# Patient Record
Sex: FEMALE | Race: ASIAN | NOT HISPANIC OR LATINO | URBAN - METROPOLITAN AREA
[De-identification: names, ages, dates, MRNs, and addresses within clinical notes are randomized per-mention and may not be internally consistent; named-entity substitution may affect disease eponyms.]

---

## 2019-10-29 ENCOUNTER — EMERGENCY (EMERGENCY)
Facility: HOSPITAL | Age: 79
LOS: 1 days | Discharge: HOME | End: 2019-10-29
Attending: EMERGENCY MEDICINE | Admitting: EMERGENCY MEDICINE
Payer: MEDICARE

## 2019-10-29 VITALS
DIASTOLIC BLOOD PRESSURE: 55 MMHG | SYSTOLIC BLOOD PRESSURE: 139 MMHG | HEART RATE: 68 BPM | RESPIRATION RATE: 16 BRPM | TEMPERATURE: 99 F

## 2019-10-29 LAB
APPEARANCE UR: CLEAR — SIGNIFICANT CHANGE UP
BACTERIA # UR AUTO: NEGATIVE — SIGNIFICANT CHANGE UP
BILIRUB UR-MCNC: NEGATIVE — SIGNIFICANT CHANGE UP
BLOOD UR QL VISUAL: SIGNIFICANT CHANGE UP
COLOR SPEC: SIGNIFICANT CHANGE UP
GLUCOSE UR-MCNC: 70 — SIGNIFICANT CHANGE UP
HYALINE CASTS # UR AUTO: NEGATIVE — SIGNIFICANT CHANGE UP
KETONES UR-MCNC: NEGATIVE — SIGNIFICANT CHANGE UP
LEUKOCYTE ESTERASE UR-ACNC: SIGNIFICANT CHANGE UP
NITRITE UR-MCNC: NEGATIVE — SIGNIFICANT CHANGE UP
PH UR: 7.5 — SIGNIFICANT CHANGE UP (ref 5–8)
PROT UR-MCNC: 200 — HIGH
RBC CASTS # UR COMP ASSIST: HIGH (ref 0–?)
SP GR SPEC: 1.01 — SIGNIFICANT CHANGE UP (ref 1–1.04)
SQUAMOUS # UR AUTO: SIGNIFICANT CHANGE UP
TSH SERPL-MCNC: 0.99 UIU/ML — SIGNIFICANT CHANGE UP (ref 0.27–4.2)
UROBILINOGEN FLD QL: NORMAL — SIGNIFICANT CHANGE UP
WBC UR QL: SIGNIFICANT CHANGE UP (ref 0–?)

## 2019-10-29 PROCEDURE — 93970 EXTREMITY STUDY: CPT | Mod: 26

## 2019-10-29 PROCEDURE — 99283 EMERGENCY DEPT VISIT LOW MDM: CPT | Mod: GC

## 2019-10-29 NOTE — ED PROVIDER NOTE - PHYSICAL EXAMINATION
Well appearing, well nourished, awake, alert, oriented to person, place, time/situation and in no apparent distress.    Airway patent    Eyes without scleral injection. No jaundice.    Strong pulse. 3/6 DANNIE heart murmur best heard at LUSB.    Respirations unlabored. Lungs clear.     Abdomen soft, non-tender, no guarding.    Spine appears normal, range of motion is not limited, no muscle or joint tenderness. (B) non-pitting LE edema.    Alert and oriented, no gross motor or sensory deficits.    Skin normal color for race, warm, dry and intact. No evidence of rash.    No SI/HI.

## 2019-10-29 NOTE — ED ADULT TRIAGE NOTE - CHIEF COMPLAINT QUOTE
sent in by nephrologist for b/l leg swelling x a few days. denies any pain. denies chest pain, sob. hx esrd on dialysis MWF. recently moved here and was in a car for 8 hours.

## 2019-10-29 NOTE — ED PROVIDER NOTE - OBJECTIVE STATEMENT
Poonam: 79 F, MWF dialysis, sent by Nephrologist (Isela Canales) for DVT to eval for leg swelling. Pt. had recent 8-hour car ride from [place]. No CP. Has OLMOS (long-standing).

## 2019-10-29 NOTE — ED PROVIDER NOTE - PATIENT PORTAL LINK FT
You can access the FollowMyHealth Patient Portal offered by [facility] by registering at the following website: [email or web address]. By joining [facility]’s FollowMyHealth portal, you will also be able to view your health information using other applications (apps) compatible with our system.

## 2019-10-29 NOTE — ED PROVIDER NOTE - ATTENDING CONTRIBUTION TO CARE
I performed a face-to-face evaluation of the patient and performed a history and physical examination. I agree with the history and physical examination.    Poonam: DARIUSx of leg swelling in this dialysis pt.: 1) volume overload (not likely, as gets routine dialysis, no SOB, non-pitting edema), 2) nephrotic syndrome (check UA), 3) hypothyroidism (check TSH), 4) DVT (possible (b/c long car ride, but not red/hot/tender) - check US, 5) Amoldipine side effect (has been on 5 yrs).

## 2019-10-29 NOTE — ED PROVIDER NOTE - CARE PLAN
Principal Discharge DX:	Leg swelling  Secondary Diagnosis:	HTN (hypertension)  Secondary Diagnosis:	Dialysis patient

## 2019-10-29 NOTE — ED PROVIDER NOTE - NSFOLLOWUPINSTRUCTIONS_ED_ALL_ED_FT
Follow-up with your Nephrologist for dialysis as scheduled. Discuss adding an ACE-Inhibitor or ARB medication for blood pressure and to decreased protein loss in your urine with your Nephrologist.     Return to the ED for any worsening leg swelling, chest pain, shortness of breath, fevers or chills, or any new concerning symptoms.

## 2019-10-29 NOTE — ED ADULT TRIAGE NOTE - RELATIONSHIP TO PATIENT
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 Abdomen closed with Aphthera, plan re explore on saterday   Surgeon: Darren Rod MD   Assistants(s): Dr. Derik Arreguin and Dr. Umu ATKINSON   Estimated blood loss: 3000 C    Specimens: Explant liver   Findings: Please see dictated notes      daughter

## 2019-10-29 NOTE — ED ADULT NURSE NOTE - OBJECTIVE STATEMENT
c/o right lower leg swelling, pt sent in by MD for r/o dvt, pt c/o b/l lower extremity edema, b/l legs normal in temperature and color, b/l pedal pulses felt, pt denies cp sob fever chills, fistula to left arm + bruit thrill felt, dialysis m,w,f, last session 10/28

## 2019-10-29 NOTE — ED PROVIDER NOTE - CLINICAL SUMMARY MEDICAL DECISION MAKING FREE TEXT BOX
Poonam: DDx of leg swelling in this dialysis pt.: 1) volume overload (not likely, as gets routine dialysis, no SOB, non-pitting edema), 2) nephrotic syndrome (check UA), 3) hypothyroidism (check TSH), 4) DVT (possible (b/c long car ride, but not red/hot/tender) - check US, 5) Amoldipine side effect (has been on 5 yrs).

## 2020-10-10 ENCOUNTER — INPATIENT (INPATIENT)
Facility: HOSPITAL | Age: 80
LOS: 2 days | Discharge: HOME | End: 2020-10-13
Attending: INTERNAL MEDICINE | Admitting: INTERNAL MEDICINE
Payer: MEDICARE

## 2020-10-10 VITALS
DIASTOLIC BLOOD PRESSURE: 46 MMHG | TEMPERATURE: 97 F | HEART RATE: 72 BPM | OXYGEN SATURATION: 100 % | SYSTOLIC BLOOD PRESSURE: 177 MMHG | RESPIRATION RATE: 19 BRPM

## 2020-10-10 DIAGNOSIS — R06.00 DYSPNEA, UNSPECIFIED: ICD-10-CM

## 2020-10-10 DIAGNOSIS — N18.6 END STAGE RENAL DISEASE: ICD-10-CM

## 2020-10-10 DIAGNOSIS — Z29.9 ENCOUNTER FOR PROPHYLACTIC MEASURES, UNSPECIFIED: ICD-10-CM

## 2020-10-10 DIAGNOSIS — D64.9 ANEMIA, UNSPECIFIED: ICD-10-CM

## 2020-10-10 DIAGNOSIS — I10 ESSENTIAL (PRIMARY) HYPERTENSION: ICD-10-CM

## 2020-10-10 DIAGNOSIS — D61.818 OTHER PANCYTOPENIA: ICD-10-CM

## 2020-10-10 PROBLEM — Z99.2 DEPENDENCE ON RENAL DIALYSIS: Chronic | Status: ACTIVE | Noted: 2019-10-29

## 2020-10-10 LAB
ALBUMIN SERPL ELPH-MCNC: 4.3 G/DL — SIGNIFICANT CHANGE UP (ref 3.3–5)
ALP SERPL-CCNC: 134 U/L — HIGH (ref 40–120)
ALT FLD-CCNC: 23 U/L — SIGNIFICANT CHANGE UP (ref 4–33)
ANION GAP SERPL CALC-SCNC: 12 MMO/L — SIGNIFICANT CHANGE UP (ref 7–14)
ANISOCYTOSIS BLD QL: SLIGHT — SIGNIFICANT CHANGE UP
AST SERPL-CCNC: 33 U/L — HIGH (ref 4–32)
BASOPHILS # BLD AUTO: 0.03 K/UL — SIGNIFICANT CHANGE UP (ref 0–0.2)
BASOPHILS NFR BLD AUTO: 0.9 % — SIGNIFICANT CHANGE UP (ref 0–2)
BASOPHILS NFR SPEC: 0.9 % — SIGNIFICANT CHANGE UP (ref 0–2)
BILIRUB SERPL-MCNC: 0.5 MG/DL — SIGNIFICANT CHANGE UP (ref 0.2–1.2)
BLASTS # FLD: 0 % — SIGNIFICANT CHANGE UP (ref 0–0)
BLD GP AB SCN SERPL QL: NEGATIVE — SIGNIFICANT CHANGE UP
BUN SERPL-MCNC: 31 MG/DL — HIGH (ref 7–23)
CALCIUM SERPL-MCNC: 10 MG/DL — SIGNIFICANT CHANGE UP (ref 8.4–10.5)
CHLORIDE SERPL-SCNC: 94 MMOL/L — LOW (ref 98–107)
CO2 SERPL-SCNC: 30 MMOL/L — SIGNIFICANT CHANGE UP (ref 22–31)
CREAT SERPL-MCNC: 4.37 MG/DL — HIGH (ref 0.5–1.3)
EOSINOPHIL # BLD AUTO: 0.05 K/UL — SIGNIFICANT CHANGE UP (ref 0–0.5)
EOSINOPHIL NFR BLD AUTO: 1.5 % — SIGNIFICANT CHANGE UP (ref 0–6)
EOSINOPHIL NFR FLD: 0.9 % — SIGNIFICANT CHANGE UP (ref 0–6)
GIANT PLATELETS BLD QL SMEAR: PRESENT — SIGNIFICANT CHANGE UP
GLUCOSE SERPL-MCNC: 120 MG/DL — HIGH (ref 70–99)
HBA1C BLD-MCNC: 5.6 % — SIGNIFICANT CHANGE UP (ref 4–5.6)
HBV SURFACE AG SER-ACNC: NEGATIVE — SIGNIFICANT CHANGE UP
HCT VFR BLD CALC: 22.6 % — LOW (ref 34.5–45)
HCT VFR BLD CALC: 30.6 % — LOW (ref 34.5–45)
HGB BLD-MCNC: 7.4 G/DL — LOW (ref 11.5–15.5)
HGB BLD-MCNC: 9.9 G/DL — LOW (ref 11.5–15.5)
HYPOCHROMIA BLD QL: SLIGHT — SIGNIFICANT CHANGE UP
IMM GRANULOCYTES NFR BLD AUTO: 0.3 % — SIGNIFICANT CHANGE UP (ref 0–1.5)
IRON SATN MFR SERPL: 152 UG/DL — SIGNIFICANT CHANGE UP (ref 30–160)
IRON SATN MFR SERPL: 211 UG/DL — SIGNIFICANT CHANGE UP (ref 140–530)
LYMPHOCYTES # BLD AUTO: 0.46 K/UL — LOW (ref 1–3.3)
LYMPHOCYTES # BLD AUTO: 14.2 % — SIGNIFICANT CHANGE UP (ref 13–44)
LYMPHOCYTES NFR SPEC AUTO: 8.8 % — LOW (ref 13–44)
MACROCYTES BLD QL: SLIGHT — SIGNIFICANT CHANGE UP
MCHC RBC-ENTMCNC: 32.1 PG — SIGNIFICANT CHANGE UP (ref 27–34)
MCHC RBC-ENTMCNC: 32.4 % — SIGNIFICANT CHANGE UP (ref 32–36)
MCHC RBC-ENTMCNC: 32.7 % — SIGNIFICANT CHANGE UP (ref 32–36)
MCHC RBC-ENTMCNC: 32.7 PG — SIGNIFICANT CHANGE UP (ref 27–34)
MCV RBC AUTO: 100 FL — SIGNIFICANT CHANGE UP (ref 80–100)
MCV RBC AUTO: 99.4 FL — SIGNIFICANT CHANGE UP (ref 80–100)
METAMYELOCYTES # FLD: 0 % — SIGNIFICANT CHANGE UP (ref 0–1)
MONOCYTES # BLD AUTO: 0.29 K/UL — SIGNIFICANT CHANGE UP (ref 0–0.9)
MONOCYTES NFR BLD AUTO: 8.9 % — SIGNIFICANT CHANGE UP (ref 2–14)
MONOCYTES NFR BLD: 5.3 % — SIGNIFICANT CHANGE UP (ref 2–9)
MYELOCYTES NFR BLD: 0 % — SIGNIFICANT CHANGE UP (ref 0–0)
NEUTROPHIL AB SER-ACNC: 83.2 % — HIGH (ref 43–77)
NEUTROPHILS # BLD AUTO: 2.41 K/UL — SIGNIFICANT CHANGE UP (ref 1.8–7.4)
NEUTROPHILS NFR BLD AUTO: 74.2 % — SIGNIFICANT CHANGE UP (ref 43–77)
NEUTS BAND # BLD: 0 % — SIGNIFICANT CHANGE UP (ref 0–6)
NRBC # BLD: 1 /100WBC — SIGNIFICANT CHANGE UP
NRBC # FLD: 0 K/UL — SIGNIFICANT CHANGE UP (ref 0–0)
NRBC # FLD: 0 K/UL — SIGNIFICANT CHANGE UP (ref 0–0)
NT-PROBNP SERPL-SCNC: SIGNIFICANT CHANGE UP PG/ML
OB PNL STL: POSITIVE — SIGNIFICANT CHANGE UP
OTHER - HEMATOLOGY %: 0 — SIGNIFICANT CHANGE UP
PLATELET # BLD AUTO: 86 K/UL — LOW (ref 150–400)
PLATELET # BLD AUTO: 92 K/UL — LOW (ref 150–400)
PLATELET COUNT - ESTIMATE: SIGNIFICANT CHANGE UP
PMV BLD: 10.4 FL — SIGNIFICANT CHANGE UP (ref 7–13)
PMV BLD: 11.1 FL — SIGNIFICANT CHANGE UP (ref 7–13)
POTASSIUM SERPL-MCNC: 3.7 MMOL/L — SIGNIFICANT CHANGE UP (ref 3.5–5.3)
POTASSIUM SERPL-SCNC: 3.7 MMOL/L — SIGNIFICANT CHANGE UP (ref 3.5–5.3)
PROMYELOCYTES # FLD: 0 % — SIGNIFICANT CHANGE UP (ref 0–0)
PROT SERPL-MCNC: 6.4 G/DL — SIGNIFICANT CHANGE UP (ref 6–8.3)
RBC # BLD: 2.26 M/UL — LOW (ref 3.8–5.2)
RBC # BLD: 3.08 M/UL — LOW (ref 3.8–5.2)
RBC # FLD: 13.2 % — SIGNIFICANT CHANGE UP (ref 10.3–14.5)
RBC # FLD: 15.6 % — HIGH (ref 10.3–14.5)
RH IG SCN BLD-IMP: POSITIVE — SIGNIFICANT CHANGE UP
SARS-COV-2 RNA SPEC QL NAA+PROBE: SIGNIFICANT CHANGE UP
SCHISTOCYTES BLD QL AUTO: SLIGHT — SIGNIFICANT CHANGE UP
SODIUM SERPL-SCNC: 136 MMOL/L — SIGNIFICANT CHANGE UP (ref 135–145)
TROPONIN T, HIGH SENSITIVITY: 120 NG/L — CRITICAL HIGH (ref ?–14)
TROPONIN T, HIGH SENSITIVITY: 136 NG/L — CRITICAL HIGH (ref ?–14)
TSH SERPL-MCNC: 2.22 UIU/ML — SIGNIFICANT CHANGE UP (ref 0.27–4.2)
UIBC SERPL-MCNC: 59 UG/DL — LOW (ref 110–370)
VARIANT LYMPHS # BLD: 0.9 % — SIGNIFICANT CHANGE UP
WBC # BLD: 3.25 K/UL — LOW (ref 3.8–10.5)
WBC # BLD: 4.46 K/UL — SIGNIFICANT CHANGE UP (ref 3.8–10.5)
WBC # FLD AUTO: 3.25 K/UL — LOW (ref 3.8–10.5)
WBC # FLD AUTO: 4.46 K/UL — SIGNIFICANT CHANGE UP (ref 3.8–10.5)

## 2020-10-10 PROCEDURE — 99285 EMERGENCY DEPT VISIT HI MDM: CPT

## 2020-10-10 PROCEDURE — 71046 X-RAY EXAM CHEST 2 VIEWS: CPT | Mod: 26

## 2020-10-10 PROCEDURE — 99223 1ST HOSP IP/OBS HIGH 75: CPT

## 2020-10-10 RX ORDER — AMLODIPINE BESYLATE 2.5 MG/1
10 TABLET ORAL DAILY
Refills: 0 | Status: DISCONTINUED | OUTPATIENT
Start: 2020-10-10 | End: 2020-10-13

## 2020-10-10 RX ORDER — LABETALOL HCL 100 MG
200 TABLET ORAL THREE TIMES A DAY
Refills: 0 | Status: DISCONTINUED | OUTPATIENT
Start: 2020-10-10 | End: 2020-10-13

## 2020-10-10 RX ORDER — LOSARTAN POTASSIUM 100 MG/1
100 TABLET, FILM COATED ORAL DAILY
Refills: 0 | Status: DISCONTINUED | OUTPATIENT
Start: 2020-10-10 | End: 2020-10-13

## 2020-10-10 RX ORDER — LABETALOL HCL 100 MG
200 TABLET ORAL ONCE
Refills: 0 | Status: COMPLETED | OUTPATIENT
Start: 2020-10-10 | End: 2020-10-10

## 2020-10-10 RX ADMIN — Medication 200 MILLIGRAM(S): at 22:00

## 2020-10-10 RX ADMIN — Medication 1 TABLET(S): at 16:32

## 2020-10-10 RX ADMIN — LOSARTAN POTASSIUM 100 MILLIGRAM(S): 100 TABLET, FILM COATED ORAL at 16:32

## 2020-10-10 RX ADMIN — Medication 200 MILLIGRAM(S): at 15:48

## 2020-10-10 RX ADMIN — AMLODIPINE BESYLATE 10 MILLIGRAM(S): 2.5 TABLET ORAL at 16:32

## 2020-10-10 NOTE — H&P ADULT - PROBLEM SELECTOR PLAN 3
-Uncontrolled as outpt. Elevated here as well  -C/w norvasc 10, olmasartan 40 and labetalol 200TID  -Can uptitrate labetalol if BP remains elevated

## 2020-10-10 NOTE — H&P ADULT - PROBLEM SELECTOR PLAN 1
-patient reports OLMOS and PND for last 3-4 weeks  -Differential include symptomatic anemia vs valvular abnormality causing CHF  -per daughter baseline hgb 10 about 2 weeks ago. Hgb downtrending since discontinuation of epogen as output.   -s/p 1 unit PRBC in ED. Anticipate post transfuse hgb to be 8.5 or greater. Hold off further transfusion at this time.   -TTE to evaluate EF and valvular pathology. Per daughter worsening "leaky valve" as outpt. Reach out to cardiologist Dr. Rodriguez on Monday for collateral.   -Appears euvolemic currently. Lungs clear. POCUS without blines. CXR unremarkable as well.   -Trops stable; elevated likely 2/2 ESRD. No prior EKG for comparison.

## 2020-10-10 NOTE — ED SUB INTERN NOTE - OBJECTIVE STATEMENT FT
79 y/o Slovak speaking female with pmhx of HTN, moderate to severe leaky valve, L AV fistula on dialysis (MWF - last dialysis yesterday) presents to the ED with worsening SOB and fatigue for several days. She also reports associated chest tightness and palpitations - stating she can hear her heart beat in her ears. At baseline, pt is able to go up the stairs but her SOB has made it increasingly more difficult. The SOB is worse at night and with lying down. She experiences mild relief with sitting up. Pt denies hx of recent blood transfusions (last transfusion during childbirth) or GI bleed. No other medical complaints at this time.   Pt refused a , she asked for her daughter to serve as .

## 2020-10-10 NOTE — H&P ADULT - HISTORY OF PRESENT ILLNESS
79 y/o F with PMH of HTN, "leaky valve" and ESRD on HD who presents to ED for transfusion given recent progressively worsening SOB. Patient is English speaking and history obtained from daughter. Per daughter patient's baseline hgb is 9-10. About 2 weeks ago epogen was stopped by nephrologist given uncontrolled BP. Since epogen discontinuation patient's hgb has been dropping. Last week it was 9.1 and 3 days ago it was found to be 7.5 and outpt providers recommended admission for transfusion. Patient also has noted difficulty with breathing when laying flat and with ambulation for last 3-4 weeks. Per daughter patient had mild leaky valve in May however it became moderate to severe in september. Her valve is being monitored by her cardiologist Dr. Rodriguez. Daughter does not know which valve is leaky. Patient denies any melena or BRBPR. Reports her stools are purple due to medication called Aurixa. Patient denies cough, fever, headache.     Vital Signs Last 24 Hrs  T(C): 36.9 (10 Oct 2020 14:57), Max: 36.9 (10 Oct 2020 14:57)  T(F): 98.4 (10 Oct 2020 14:57), Max: 98.4 (10 Oct 2020 14:57)  HR: 77 (10 Oct 2020 14:57) (72 - 78)  BP: 189/69 (10 Oct 2020 14:57) (173/68 - 197/60)  BP(mean): --  RR: 18 (10 Oct 2020 14:57) (17 - 19)  SpO2: 99% (10 Oct 2020 14:57) (97% - 100%)

## 2020-10-10 NOTE — ED ADULT NURSE NOTE - CHIEF COMPLAINT QUOTE
Pt primarily Maltese speaking, declines  services daughter to translate. Pt c/o sob, weakness. Reports hbg 7.5 in dialysis blood work last Wednesday. Lft AV fistula noted. PMHX Dialysis MWF, HTN, blood transfusions.  Daughter Garrett Turner

## 2020-10-10 NOTE — H&P ADULT - ASSESSMENT
79 y/o F with PMH of HTN, "leaky valve" and ESRD on HD who presents to ED for transfusion given recent progressively worsening SOB found to be anemic

## 2020-10-10 NOTE — H&P ADULT - PROBLEM SELECTOR PLAN 2
-Suspect anemia of chronic disease 2/2 ESRD  -Add on ferretin, iron and TIBC ( not accurate if checked post transfusion)  -Epo stopped few weeks ago due to uncontrolled HTN. Was given Micera as outpt recently.   -Occult positive however no reported melena or BRBPR. Last EGD/colon in 2019 was largely unremarkable per daughter. At this time no overt signs of GIB  -Monitor hgb.   -Can consider GI consult if signs of GIB

## 2020-10-10 NOTE — H&P ADULT - PROBLEM SELECTOR PLAN 4
-HD per renal  -epo when ok with renal   -needs control of BP first  -Micera given recently as outpt -Unknown baseline; attempt to get baseline labs from PCP on Monday  -Possible MDS? May need close outpt f/u and possible heme evaluation   -check b12, folate

## 2020-10-10 NOTE — ED PROVIDER NOTE - OBJECTIVE STATEMENT
81 yo female c pmhx ESRD on dialysis (MWF), HTN, Romanian speaking (decline translation services and preferred her daughter  Garrett Turner) presents to ED c/o SOB, palpitations, fatigue x few days.  SOB worse at night when laying down and on exertion.  Per daughter, last week hemoglobin was 7.5.  Last blood transfusion decades ago after childbirth. Pt's last dialysis yesterday.  Pt denies any abd pain, dark stools, dizziness, fever, headache, worsening leg swelling.

## 2020-10-10 NOTE — H&P ADULT - NSHPLABSRESULTS_GEN_ALL_CORE
7.4    3.25  )-----------( 86       ( 10 Oct 2020 08:40 )             22.6   10-10    136  |  94<L>  |  31<H>  ----------------------------<  120<H>  3.7   |  30  |  4.37<H>    Ca    10.0      10 Oct 2020 08:40    TPro  6.4  /  Alb  4.3  /  TBili  0.5  /  DBili  x   /  AST  33<H>  /  ALT  23  /  AlkPhos  134<H>  10-10      < from: Xray Chest 2 Views PA/Lat (10.10.20 @ 09:37) >    FINDINGS:    Vascular graft in the region of the left subclavian.  Lungs are free of focal consolidations.  Bilateral basilar atelectasis. No pleural effusion or pneumothorax.  Heart size is enlarged.  No acute osseous abnormalities.      IMPRESSION: No focal consolidations.    < end of copied text >

## 2020-10-10 NOTE — ED ADULT TRIAGE NOTE - ISOLATION TYPE:
72F with PMH of HTN, HLD, MI (1995), CHF (unknown EF), ICD with explant and replacement in abdominal compartment (patient unsure why),A-fib on xaralto p/w acute chest pain. Patient saw her cardiologist on Monday for worsening lower extremity edema. He started on diuretics and set patient up for a TTE/stress test in March.    patient c/o cp in ER now resolved she can sleep flat    As per hospitalist pt is clear for discharge 72F with PMH of HTN, HLD, MI (1995), CHF (unknown EF), ICD with explant and replacement in abdominal compartment (patient unsure why),A-fib on xaralto p/w acute chest pain. Patient saw her cardiologist on Monday for worsening lower extremity edema. He started on diuretics and set patient up for a TTE/stress test in March.    patient c/o cp in ER now resolved she can ambulate without SOB to bathroom and lie flat. Arrangements were made with patient's private cardiologist to go for stress test within 24 hours of discharge. Given asymptomatic, cleared by her cardiologist and stress test made within 24 hours, patient is stable for discharge None

## 2020-10-10 NOTE — ED ADULT NURSE NOTE - NSIMPLEMENTINTERV_GEN_ALL_ED
Implemented All Fall with Harm Risk Interventions:  [place] to call system. Call bell, personal items and telephone within reach. Instruct patient to call for assistance. Room bathroom lighting operational. Non-slip footwear when patient is off stretcher. Physically safe environment: no spills, clutter or unnecessary equipment. Stretcher in lowest position, wheels locked, appropriate side rails in place. Provide visual cue, wrist band, yellow gown, etc. Monitor gait and stability. Monitor for mental status changes and reorient to person, place, and time. Review medications for side effects contributing to fall risk. Reinforce activity limits and safety measures with patient and family. Provide visual clues: red socks.

## 2020-10-10 NOTE — ED PROVIDER NOTE - CLINICAL SUMMARY MEDICAL DECISION MAKING FREE TEXT BOX
81 yo female c pmhx ESRD on dialysis (MWF), HTN, Portuguese speaking (decline translation services and preferred her daughter  Garrett Turner) presents to ED c/o SOB, palpitations, fatigue x few days.  Reported hemoglobin 7.5 from last week at dialysis.  r/o symptomatic anemia vs ACS- check labs, ekg, cxr

## 2020-10-10 NOTE — ED ADULT NURSE NOTE - OBJECTIVE STATEMENT
Pt sent to ER due to low hgb 7.5. Had diaysis on Friday, goes MWF, + left arm fistula. As per Daughter Heesun " She c/o feeling weak, short of breath especially at night and walking, also c/o palpitations and Chest tightness and some dizziness. "  Hx of Transfusions..Presently denies any chest pain. As per daughter no falls but noticed she seems weaker and needs more asst at home walks slower, has lived alone but recently daughter is staying with her. Pt instructed via translation  to use call bell, verbalizing understanding, Placed on cm, labs to follow. Handoff to oncoming RN Mu to follow.

## 2020-10-10 NOTE — H&P ADULT - PROBLEM SELECTOR PLAN 5
SCDs for now -HD per renal  -epo when ok with renal   -needs control of BP first  -Micera given recently as outpt

## 2020-10-10 NOTE — ED PROVIDER NOTE - ATTENDING CONTRIBUTION TO CARE
DR. MORRIS, ATTENDING MD-  I performed a face to face bedside interview with the patient regarding history of present illness, review of symptoms and past medical history. I completed an independent physical exam.  I have discussed the patient's plan of care with the PA.   Documentation as above in the note.    81 y/o female h/o esrd on hd mwf, htn c/o exertional dyspnea, orthopnea, fatigue x few days.  Hgb last week was 7.5.  Baseline per daughter is 10.  Denies black/bloody stool.  Per daughter, epogen was making her mother's bp elevated so it was held for two weeks and subsequently her hgb downtrended and her nephrologist recommended ED visit for transfusion.  Denies f/c, ha, neck stiffness, cp, cough, abd pain, n/v/d, rash.  Afebrile, pale, fatigued, ctabil, s1s2 rrr no m/r/g, abd soft non ttp no r/g, no cva tenderness b/l, leg swelling b/l, no rash.  Likely sympt anemia, consider acs, new chf, pna, lyte abn.  Obtain cbc cmp trop bnp t&s cxr ekg covid swab, admit for xfusion.

## 2020-10-10 NOTE — ED SUB INTERN NOTE - GENERIC SYMPTOMS NEGATIVE FT
No f/c, cough, sore throat, n/v, abd pain, abd distention, worsening edema, recent illness, sick contacts, recent travel

## 2020-10-10 NOTE — ED PROVIDER NOTE - CARE PLAN
Principal Discharge DX:	Symptomatic anemia   Principal Discharge DX:	Symptomatic anemia  Secondary Diagnosis:	Shortness of breath

## 2020-10-10 NOTE — CONSULT NOTE ADULT - SUBJECTIVE AND OBJECTIVE BOX
[facility]   MD Kyler Cuevas MD, D.O.  TOÑA Membreno    From 7 AM - 5 PM:  OFFICE: [phone]  Dr. Adame cell: [phone]  Dr. Montgomery Cell: [phone]  Dr. Durant cell: [phone]  TOÑA Gonzalez cell: [phone]    From 5 PM - 7 AM: Answering Service: [phone]  Date of service 10-10-20 @ 16:12    -- INITIAL RENAL CONSULT NOTE  --------------------------------------------------------------------------------  HPI: 79 yo female w/ pmhx ESRD on dialysis (MWF) via AVF from [place] HD unit, HTN, Danish speaking (decline translation services and preferred her daughter  Garrett Turner) admitted with c/o SOB, palpitations, fatigue x few days from symptomatic anemia.  SOB worse at night when laying down and on exertion. Per daughter, last week hemoglobin was 7.5.  Last blood transfusion decades ago after childbirth. Pt's last dialysis Friday.  Pt denies any abd pain, dark stools, dizziness, fever, headache, worsening leg swelling.    PAST HISTORY  --------------------------------------------------------------------------------  PAST MEDICAL & SURGICAL HISTORY:  HTN (hypertension)    Dialysis patient      FAMILY HISTORY:    PAST SOCIAL HISTORY:    ALLERGIES & MEDICATIONS  --------------------------------------------------------------------------------  Allergies    alcohol swabs (Unknown)  IV contrast (Urticaria)  No Known Drug Allergies    Intolerances      Standing Inpatient Medications  amLODIPine   Tablet 10 milliGRAM(s) Oral daily  labetalol 200 milliGRAM(s) Oral three times a day  losartan 100 milliGRAM(s) Oral daily  Nephro-terri 1 Tablet(s) Oral daily    PRN Inpatient Medications      REVIEW OF SYSTEMS  --------------------------------------------------------------------------------  Gen: No fevers/chills  Skin: No rashes  Head/Eyes/Ears: Normal hearing,  Normal vision   Respiratory: No dyspnea, cough  CV: No chest pain  GI: No abdominal pain, diarrhea, constipation, nausea, vomiting  : No dysuria, hematuria  MSK: No  edema  Heme: No easy bruising or bleeding  Psych: No significant depression    All other systems were reviewed and are negative, except as noted.    VITALS/PHYSICAL EXAM  --------------------------------------------------------------------------------  T(C): 36.9 (10-10-20 @ 14:57), Max: 36.9 (10-10-20 @ 14:57)  HR: 77 (10-10-20 @ 14:57) (72 - 78)  BP: 189/69 (10-10-20 @ 14:57) (173/68 - 197/60)  RR: 18 (10-10-20 @ 14:57) (17 - 19)  SpO2: 99% (10-10-20 @ 14:57) (97% - 100%)  Wt(kg): --  Height (cm): 170.2 (10-10-20 @ 12:53)  Weight (kg): 53.7 (10-10-20 @ 12:53)  BMI (kg/m2): 18.5 (10-10-20 @ 12:53)  BSA (m2): 1.62 (10-10-20 @ 12:53)      Physical Exam:  	Gen: NAD  	HEENT: MMM  	Pulm: CTA B/L  	CV: S1S2  	Abd: Soft, +BS   	Ext: No LE edema B/L  	Neuro: Awake  	Skin: Warm and dry  	Vascular access: +AVF    LABS/STUDIES  --------------------------------------------------------------------------------              7.4    3.25  >-----------<  86       [10-10-20 @ 08:40]              22.6     136  |  94  |  31  ----------------------------<  120      [10-10-20 @ 08:40]  3.7   |  30  |  4.37        Ca     10.0     [10-10-20 @ 08:40]    TPro  6.4  /  Alb  4.3  /  TBili  0.5  /  DBili  x   /  AST  33  /  ALT  23  /  AlkPhos  134  [10-10-20 @ 08:40]          Creatinine Trend:  SCr 4.37 [10-10 @ 08:40]    Urinalysis - [10-29-19 @ 18:10]      Color LIGHT YELLOW / Appearance CLEAR / SG 1.011 / pH 7.5      Gluc 70 / Ketone NEGATIVE  / Bili NEGATIVE / Urobili NORMAL       Blood SMALL / Protein 200 / Leuk Est TRACE / Nitrite NEGATIVE      RBC 6-10 / WBC 3-5 / Hyaline NEGATIVE / Gran  / Sq Epi OCC / Non Sq Epi  / Bacteria NEGATIVE      Iron 152, TIBC 211, %sat --      [10-10-20 @ 10:35]  TSH 2.22      [10-10-20 @ 10:35]

## 2020-10-11 ENCOUNTER — TRANSCRIPTION ENCOUNTER (OUTPATIENT)
Age: 80
End: 2020-10-11

## 2020-10-11 LAB
ALBUMIN SERPL ELPH-MCNC: 4.2 G/DL — SIGNIFICANT CHANGE UP (ref 3.3–5)
ALP SERPL-CCNC: 131 U/L — HIGH (ref 40–120)
ALT FLD-CCNC: 20 U/L — SIGNIFICANT CHANGE UP (ref 4–33)
ANION GAP SERPL CALC-SCNC: 13 MMO/L — SIGNIFICANT CHANGE UP (ref 7–14)
APPEARANCE UR: CLEAR — SIGNIFICANT CHANGE UP
AST SERPL-CCNC: 25 U/L — SIGNIFICANT CHANGE UP (ref 4–32)
BACTERIA # UR AUTO: NEGATIVE — SIGNIFICANT CHANGE UP
BASOPHILS # BLD AUTO: 0.03 K/UL — SIGNIFICANT CHANGE UP (ref 0–0.2)
BASOPHILS NFR BLD AUTO: 0.9 % — SIGNIFICANT CHANGE UP (ref 0–2)
BILIRUB SERPL-MCNC: 1 MG/DL — SIGNIFICANT CHANGE UP (ref 0.2–1.2)
BILIRUB UR-MCNC: NEGATIVE — SIGNIFICANT CHANGE UP
BLOOD UR QL VISUAL: NEGATIVE — SIGNIFICANT CHANGE UP
BUN SERPL-MCNC: 46 MG/DL — HIGH (ref 7–23)
CALCIUM SERPL-MCNC: 10 MG/DL — SIGNIFICANT CHANGE UP (ref 8.4–10.5)
CHLORIDE SERPL-SCNC: 91 MMOL/L — LOW (ref 98–107)
CO2 SERPL-SCNC: 28 MMOL/L — SIGNIFICANT CHANGE UP (ref 22–31)
COLOR SPEC: SIGNIFICANT CHANGE UP
CREAT SERPL-MCNC: 5.84 MG/DL — HIGH (ref 0.5–1.3)
EOSINOPHIL # BLD AUTO: 0.06 K/UL — SIGNIFICANT CHANGE UP (ref 0–0.5)
EOSINOPHIL NFR BLD AUTO: 1.7 % — SIGNIFICANT CHANGE UP (ref 0–6)
FERRITIN SERPL-MCNC: 2109 NG/ML — HIGH (ref 15–150)
FOLATE SERPL-MCNC: > 20 NG/ML — HIGH (ref 4.7–20)
GLUCOSE SERPL-MCNC: 98 MG/DL — SIGNIFICANT CHANGE UP (ref 70–99)
GLUCOSE UR-MCNC: 50 — SIGNIFICANT CHANGE UP
HCT VFR BLD CALC: 26.5 % — LOW (ref 34.5–45)
HGB BLD-MCNC: 8.9 G/DL — LOW (ref 11.5–15.5)
HYALINE CASTS # UR AUTO: NEGATIVE — SIGNIFICANT CHANGE UP
IMM GRANULOCYTES NFR BLD AUTO: 0.3 % — SIGNIFICANT CHANGE UP (ref 0–1.5)
KETONES UR-MCNC: NEGATIVE — SIGNIFICANT CHANGE UP
LEUKOCYTE ESTERASE UR-ACNC: NEGATIVE — SIGNIFICANT CHANGE UP
LYMPHOCYTES # BLD AUTO: 0.66 K/UL — LOW (ref 1–3.3)
LYMPHOCYTES # BLD AUTO: 18.9 % — SIGNIFICANT CHANGE UP (ref 13–44)
MAGNESIUM SERPL-MCNC: 2.2 MG/DL — SIGNIFICANT CHANGE UP (ref 1.6–2.6)
MCHC RBC-ENTMCNC: 32.7 PG — SIGNIFICANT CHANGE UP (ref 27–34)
MCHC RBC-ENTMCNC: 33.6 % — SIGNIFICANT CHANGE UP (ref 32–36)
MCV RBC AUTO: 97.4 FL — SIGNIFICANT CHANGE UP (ref 80–100)
MONOCYTES # BLD AUTO: 0.32 K/UL — SIGNIFICANT CHANGE UP (ref 0–0.9)
MONOCYTES NFR BLD AUTO: 9.2 % — SIGNIFICANT CHANGE UP (ref 2–14)
NEUTROPHILS # BLD AUTO: 2.41 K/UL — SIGNIFICANT CHANGE UP (ref 1.8–7.4)
NEUTROPHILS NFR BLD AUTO: 69 % — SIGNIFICANT CHANGE UP (ref 43–77)
NITRITE UR-MCNC: NEGATIVE — SIGNIFICANT CHANGE UP
NRBC # FLD: 0 K/UL — SIGNIFICANT CHANGE UP (ref 0–0)
PH UR: 8 — SIGNIFICANT CHANGE UP (ref 5–8)
PHOSPHATE SERPL-MCNC: 3.8 MG/DL — SIGNIFICANT CHANGE UP (ref 2.5–4.5)
PLATELET # BLD AUTO: 85 K/UL — LOW (ref 150–400)
PMV BLD: 10.6 FL — SIGNIFICANT CHANGE UP (ref 7–13)
POTASSIUM SERPL-MCNC: 3.7 MMOL/L — SIGNIFICANT CHANGE UP (ref 3.5–5.3)
POTASSIUM SERPL-SCNC: 3.7 MMOL/L — SIGNIFICANT CHANGE UP (ref 3.5–5.3)
PROT SERPL-MCNC: 6.2 G/DL — SIGNIFICANT CHANGE UP (ref 6–8.3)
PROT UR-MCNC: 300 — HIGH
RBC # BLD: 2.72 M/UL — LOW (ref 3.8–5.2)
RBC # FLD: 15.9 % — HIGH (ref 10.3–14.5)
RBC CASTS # UR COMP ASSIST: SIGNIFICANT CHANGE UP (ref 0–?)
SARS-COV-2 IGG SERPL QL IA: NEGATIVE — SIGNIFICANT CHANGE UP
SARS-COV-2 IGM SERPL IA-ACNC: 0.09 INDEX — SIGNIFICANT CHANGE UP
SODIUM SERPL-SCNC: 132 MMOL/L — LOW (ref 135–145)
SP GR SPEC: 1.01 — SIGNIFICANT CHANGE UP (ref 1–1.04)
SQUAMOUS # UR AUTO: SIGNIFICANT CHANGE UP
UROBILINOGEN FLD QL: NORMAL — SIGNIFICANT CHANGE UP
VIT B12 SERPL-MCNC: 1512 PG/ML — HIGH (ref 200–900)
WBC # BLD: 3.49 K/UL — LOW (ref 3.8–10.5)
WBC # FLD AUTO: 3.49 K/UL — LOW (ref 3.8–10.5)
WBC UR QL: SIGNIFICANT CHANGE UP (ref 0–?)

## 2020-10-11 PROCEDURE — 93306 TTE W/DOPPLER COMPLETE: CPT | Mod: 26

## 2020-10-11 RX ORDER — LIDOCAINE AND PRILOCAINE CREAM 25; 25 MG/G; MG/G
1 CREAM TOPICAL
Refills: 0 | Status: DISCONTINUED | OUTPATIENT
Start: 2020-10-11 | End: 2020-10-13

## 2020-10-11 RX ORDER — ONDANSETRON 8 MG/1
4 TABLET, FILM COATED ORAL ONCE
Refills: 0 | Status: DISCONTINUED | OUTPATIENT
Start: 2020-10-11 | End: 2020-10-13

## 2020-10-11 RX ADMIN — AMLODIPINE BESYLATE 10 MILLIGRAM(S): 2.5 TABLET ORAL at 06:04

## 2020-10-11 RX ADMIN — Medication 200 MILLIGRAM(S): at 22:27

## 2020-10-11 RX ADMIN — LOSARTAN POTASSIUM 100 MILLIGRAM(S): 100 TABLET, FILM COATED ORAL at 06:04

## 2020-10-11 RX ADMIN — Medication 200 MILLIGRAM(S): at 13:38

## 2020-10-11 RX ADMIN — Medication 200 MILLIGRAM(S): at 06:04

## 2020-10-11 RX ADMIN — Medication 1 TABLET(S): at 13:37

## 2020-10-11 NOTE — DISCHARGE NOTE PROVIDER - NSDCFUADDAPPT_GEN_ALL_CORE_FT
Follow up with your primary care physician for further monitoring in 1-2 weeks. Please call to arrange appointment.  Follow up with nephrology within 1-2 weeks of discharge.  Follow up with cardiology within 1-2 weeks of discharge.

## 2020-10-11 NOTE — CONSULT NOTE ADULT - SUBJECTIVE AND OBJECTIVE BOX
Stevie Zamorano MD  Interventional Cardiology / Advance Heart Failure and Cardiac Transplant Specialist  [place] Office : [address] [place] [address] [place] [address]  Tel:   [place] Office : [address] [facility] [place] [address]  Tel: [phone]  Cell : [phone]    HISTORY OF PRESENTING ILLNESS:  79 y/o F with PMH of HTN, "leaky valve" and ESRD on HD who presents to ED for transfusion given recent progressively worsening SOB. Patient is Belarusian speaking and history obtained from daughter. Per daughter patient's baseline hgb is 9-10. About 2 weeks ago epogen was stopped by nephrologist given uncontrolled BP. Since epogen discontinuation patient's hgb has been dropping. Last week it was 9.1 and 3 days ago it was found to be 7.5 and outpt providers recommended admission for transfusion. Patient also has noted difficulty with breathing when laying flat and with ambulation for last 3-4 weeks. Per daughter patient had mild leaky valve in May however it became moderate to severe in september. Her valve is being monitored by her cardiologist Dr. Rodriguez. Daughter does not know which valve is leaky. Patient denies any melena or BRBPR. Reports her stools are purple due to medication called Aurixa. Patient denies cough, fever, headache.   	  MEDICATIONS:  amLODIPine   Tablet 10 milliGRAM(s) Oral daily  labetalol 200 milliGRAM(s) Oral three times a day  losartan 100 milliGRAM(s) Oral daily              Nephro-terri 1 Tablet(s) Oral daily      PAST MEDICAL/SURGICAL HISTORY  PAST MEDICAL & SURGICAL HISTORY:  HTN (hypertension)    Dialysis patient        SOCIAL HISTORY: Substance Use (street drugs): ( x ) never used  (  ) other:    FAMILY HISTORY:      REVIEW OF SYSTEMS:  CONSTITUTIONAL: No fever, weight loss, or fatigue  EYES: No eye pain, visual disturbances, or discharge  ENMT:  No difficulty hearing, tinnitus, vertigo; No sinus or throat pain  BREASTS: No pain, masses, or nipple discharge  GASTROINTESTINAL: No abdominal or epigastric pain. No nausea, vomiting, or hematemesis; No diarrhea or constipation. No melena or hematochezia.  GENITOURINARY: No dysuria, frequency, hematuria, or incontinence  NEUROLOGICAL: No headaches, memory loss, loss of strength, numbness, or tremors  ENDOCRINE: No heat or cold intolerance; No hair loss  MUSCULOSKELETAL: No joint pain or swelling; No muscle, back, or extremity pain  PSYCHIATRIC: No depression, anxiety, mood swings, or difficulty sleeping  HEME/LYMPH: No easy bruising, or bleeding gums  All others negative    PHYSICAL EXAM:  T(C): 37 (10-11-20 @ 12:47), Max: 37 (10-11-20 @ 12:47)  HR: 65 (10-11-20 @ 12:47) (65 - 77)  BP: 158/46 (10-11-20 @ 12:47) (144/52 - 189/69)  RR: 18 (10-11-20 @ 12:47) (18 - 18)  SpO2: 100% (10-11-20 @ 12:47) (97% - 100%)  Wt(kg): --  I&O's Summary    10 Oct 2020 07:01  -  11 Oct 2020 07:00  --------------------------------------------------------  IN: 600 mL / OUT: 0 mL / NET: 600 mL          GENERAL: NAD  EYES: EOMI, PERRLA, conjunctiva and sclera clear  ENMT: No tonsillar erythema, exudates, or enlargement; Moist mucous membranes, Good dentition, No lesions  Cardiovascular: Normal S1 S2, No JVD, No murmurs, No edema  Respiratory: Lungs clear to auscultation	  Gastrointestinal:  Soft, Non-tender, + BS	  Extremities: Normal range of motion, No clubbing, cyanosis or edema  LYMPH: No lymphadenopathy noted  NERVOUS SYSTEM:  Alert & Oriented X3, Good concentration; Motor Strength 5/5 B/L upper and lower extremities; DTRs 2+ intact and symmetric                                    8.9    3.49  )-----------( 85       ( 11 Oct 2020 06:34 )             26.5     10-11    132<L>  |  91<L>  |  46<H>  ----------------------------<  98  3.7   |  28  |  5.84<H>    Ca    10.0      11 Oct 2020 06:34  Phos  3.8     10-11  Mg     2.2     10-11    TPro  6.2  /  Alb  4.2  /  TBili  1.0  /  DBili  x   /  AST  25  /  ALT  20  /  AlkPhos  131<H>  10-11    proBNP:   Lipid Profile:   HgA1c:   TSH:     Consultant(s) Notes Reviewed:  [x ] YES  [ ] NO    Care Discussed with Consultants/Other Providers [ x] YES  [ ] NO    Imaging Personally Reviewed independently:  [x] YES  [ ] NO    All labs, radiologic studies, vitals, orders and medications list reviewed. Patient is seen and examined at bedside. Case discussed with medical team.         Stevie Zamorano MD  Interventional Cardiology / Advance Heart Failure and Cardiac Transplant Specialist  [place] Office : [address] [place] [address] [place] [address]  Tel:   [place] Office : [address] [facility] [place] [address]  Tel: [phone]  Cell : [phone]    HISTORY OF PRESENTING ILLNESS:  79 y/o F with PMH of HTN, "leaky valve" and ESRD on HD who presents to ED for transfusion given recent progressively worsening SOB. Patient is Mongolian speaking and history obtained from daughter. Per daughter patient's baseline hgb is 9-10. About 2 weeks ago epogen was stopped by nephrologist given uncontrolled BP. Since epogen discontinuation patient's hgb has been dropping. Last week it was 9.1 and 3 days ago it was found to be 7.5 and outpt providers recommended admission for transfusion. Patient also has noted difficulty with breathing when laying flat and with ambulation for last 1.5 months. Per daughter patient had mild leaky valve in May however it became moderate to severe in september. Her valve is being monitored by her cardiologist Dr. Rodriguez. Daughter does not know which valve is leaky. Patient denies any melena or BRBPR. Reports her stools are purple due to medication called Aurixa. Patient denies cough, fever, headache.   	  MEDICATIONS:  amLODIPine   Tablet 10 milliGRAM(s) Oral daily  labetalol 200 milliGRAM(s) Oral three times a day  losartan 100 milliGRAM(s) Oral daily              Nephro-terri 1 Tablet(s) Oral daily      PAST MEDICAL/SURGICAL HISTORY  PAST MEDICAL & SURGICAL HISTORY:  HTN (hypertension)    Dialysis patient        SOCIAL HISTORY: Substance Use (street drugs): ( x ) never used  (  ) other:    FAMILY HISTORY:      REVIEW OF SYSTEMS:  CONSTITUTIONAL: No fever, weight loss, or fatigue  EYES: No eye pain, visual disturbances, or discharge  ENMT:  No difficulty hearing, tinnitus, vertigo; No sinus or throat pain  BREASTS: No pain, masses, or nipple discharge  GASTROINTESTINAL: No abdominal or epigastric pain. No nausea, vomiting, or hematemesis; No diarrhea or constipation. No melena or hematochezia.  GENITOURINARY: No dysuria, frequency, hematuria, or incontinence  NEUROLOGICAL: No headaches, memory loss, loss of strength, numbness, or tremors  ENDOCRINE: No heat or cold intolerance; No hair loss  MUSCULOSKELETAL: No joint pain or swelling; No muscle, back, or extremity pain  PSYCHIATRIC: No depression, anxiety, mood swings, or difficulty sleeping  HEME/LYMPH: No easy bruising, or bleeding gums  All others negative    PHYSICAL EXAM:  T(C): 37 (10-11-20 @ 12:47), Max: 37 (10-11-20 @ 12:47)  HR: 65 (10-11-20 @ 12:47) (65 - 77)  BP: 158/46 (10-11-20 @ 12:47) (144/52 - 189/69)  RR: 18 (10-11-20 @ 12:47) (18 - 18)  SpO2: 100% (10-11-20 @ 12:47) (97% - 100%)  Wt(kg): --  I&O's Summary    10 Oct 2020 07:01  -  11 Oct 2020 07:00  --------------------------------------------------------  IN: 600 mL / OUT: 0 mL / NET: 600 mL          GENERAL: NAD  EYES: EOMI, PERRLA, conjunctiva and sclera clear  ENMT: No tonsillar erythema, exudates, or enlargement  Cardiovascular: Normal S1 S2, No JVD, Holosystolic murmur, No edema  Respiratory: crackles b/l  Gastrointestinal:  Soft, Non-tender, + BS	  Extremities: Normal range of motion, No clubbing, cyanosis or edema  NERVOUS SYSTEM:  Alert & Oriented X3                                    8.9    3.49  )-----------( 85       ( 11 Oct 2020 06:34 )             26.5     10-11    132<L>  |  91<L>  |  46<H>  ----------------------------<  98  3.7   |  28  |  5.84<H>    Ca    10.0      11 Oct 2020 06:34  Phos  3.8     10-11  Mg     2.2     10-11    TPro  6.2  /  Alb  4.2  /  TBili  1.0  /  DBili  x   /  AST  25  /  ALT  20  /  AlkPhos  131<H>  10-11    proBNP:   Lipid Profile:   HgA1c:   TSH:     Consultant(s) Notes Reviewed:  [x ] YES  [ ] NO    Care Discussed with Consultants/Other Providers [ x] YES  [ ] NO    Imaging Personally Reviewed independently:  [x] YES  [ ] NO    All labs, radiologic studies, vitals, orders and medications list reviewed. Patient is seen and examined at bedside. Case discussed with medical team.

## 2020-10-11 NOTE — PROGRESS NOTE ADULT - ASSESSMENT
81 yo female w/ pmhx ESRD on dialysis (MWF) via AVF from [facility], HTN, Romanian speaking (decline translation services and preferred her daughter  Garrett Turner) admitted with c/o SOB, palpitations, fatigue x few days from symptomatic anemia.  SOB worse at night when laying down and on exertion. Per daughter, last week hemoglobin was 7.5.  Last blood transfusion decades ago after childbirth. Pt's last dialysis Friday.  Pt denies any abd pain, dark stools, dizziness, fever, headache, worsening leg swelling.    ESRD on HD via AVF MWF  from [facility]  HD consent in the chart  No plan for HD today  Pt cleared from nephrology for d/c post blood transfusion once medically optimized  Plan for HD Monday if pt remains inpt     HTN  BP elevated   continue home BP medications--losartan, labetalol, amlodipine     Anemia  Hb low, pt symptomatic  s/p prbcs   Improved

## 2020-10-11 NOTE — PROGRESS NOTE ADULT - ASSESSMENT
Problem/Plan - 1:  ·  Problem: Dyspnea on exertion.  Plan: -patient reports OLMOS and PND for last 3-4 weeks  -Differential include symptomatic anemia vs valvular abnormality causing CHF  -per daughter baseline hgb 10 about 2 weeks ago. Hgb downtrending since discontinuation of epogen as output.   -s/p 1 unit PRBC in ED. Anticipate post transfuse hgb to be 8.5 or greater. Hold off further transfusion at this time.   -TTE to evaluate EF and valvular pathology. Per daughter worsening "leaky valve" as outpt. Reach out to cardiologist Dr. Rodriguez on Monday for collateral.   -Appears euvolemic currently. Lungs clear. POCUS without blines. CXR unremarkable as well.   -Trops stable; elevated likely 2/2 ESRD. No prior EKG for comparison.     Problem/Plan - 2:  ·  Problem: Anemia.  Plan: -Suspect anemia of chronic disease 2/2 ESRD  -Add on ferretin, iron and TIBC ( not accurate if checked post transfusion)  -Epo stopped few weeks ago due to uncontrolled HTN. Was given Micera as outpt recently.   -Occult positive however no reported melena or BRBPR. Last EGD/colon in 2019 was largely unremarkable per daughter. At this time no overt signs of GIB  -Monitor hgb.   -Can consider GI consult if signs of GIB.     Problem/Plan - 3:  ·  Problem: HTN (hypertension).  Plan: -Uncontrolled as outpt. Elevated here as well  -C/w norvasc 10, olmasartan 40 and labetalol 200TID  -Can uptitrate labetalol if BP remains elevated.     Problem/Plan - 4:  ·  Problem: Pancytopenia. Plan: -Unknown baseline; attempt to get baseline labs from PCP on Monday  -Possible MDS? May need close outpt f/u and possible heme evaluation   -check b12, folate.  Problem/Plan - 5:  ·  Problem: ESRD on dialysis. Plan: -HD per renal  -epo when ok with renal   -needs control of BP first  -Micera given recently as outpt.    Problem/Plan - 6:  Problem: DVT prophylaxis. Plan: SCDs for now.

## 2020-10-11 NOTE — DISCHARGE NOTE PROVIDER - HOSPITAL COURSE
HPI:  81 y/o F with PMH of HTN, "leaky valve" and ESRD on HD who presents to ED for transfusion given recent progressively worsening SOB. Patient is Tamazight speaking and history obtained from daughter. Per daughter patient's baseline hgb is 9-10. About 2 weeks ago epogen was stopped by nephrologist given uncontrolled BP. Since epogen discontinuation patient's hgb has been dropping. Last week it was 9.1 and 3 days ago it was found to be 7.5 and outpt providers recommended admission for transfusion. Patient also has noted difficulty with breathing when laying flat and with ambulation for last 3-4 weeks. Per daughter patient had mild leaky valve in May however it became moderate to severe in september. Her valve is being monitored by her cardiologist Dr. Rodriguez. Daughter does not know which valve is leaky. Patient denies any melena or BRBPR. Reports her stools are purple due to medication called Aurixa. Patient denies cough, fever, headache.     On admission received one unit of pRBC with improvement in HgB. Routine HD was performed. Echocardiogram was ordered. 79 y/o F with PMH of HTN, "leaky valve" and ESRD on HD who presents to ED for transfusion given recent progressively worsening SOB. Patient is Serbian speaking and history obtained from daughter. Per daughter patient's baseline hgb is 9-10. About 2 weeks ago epogen was stopped by nephrologist given uncontrolled BP. Since epogen discontinuation patient's hgb has been dropping. Last week it was 9.1 and 3 days ago it was found to be 7.5 and outpt providers recommended admission for transfusion. Patient also has noted difficulty with breathing when laying flat and with ambulation for last 3-4 weeks. Per daughter patient had mild leaky valve in May however it became moderate to severe in september. Her valve is being monitored by her cardiologist Dr. Rodriguez. Daughter does not know which valve is leaky. Patient denies any melena or BRBPR. Reports her stools are purple due to medication called Aurixa. Patient denies cough, fever, headache.     On admission received one unit of pRBC with improvement in HgB. Fecal occult positive. Patient to follow up with nephrologist and PCP as outpatient for further monitoring and evaluation.  Routine HD was performed inpatient.  Echocardiogram showed  normal left ventricular systolic function. No segmental wall motion abnormalities. Moderate curcumferentlal pericardial effusion, which is partially organized. No evidence of diastolic right heart collapse. Moderate pulmonary hypertension. Patient to follow up with cardiologist Dr. Rodriguez as outpatient. 81 y/o F with PMH of HTN, "leaky valve" and ESRD on HD who presents to ED for transfusion given recent progressively worsening SOB. Patient is Syriac speaking and history obtained from daughter. Per daughter patient's baseline hgb is 9-10. About 2 weeks ago epogen was stopped by nephrologist given uncontrolled BP. Since epogen discontinuation patient's hgb has been dropping. Last week it was 9.1 and 3 days ago it was found to be 7.5 and outpt providers recommended admission for transfusion. Patient also has noted difficulty with breathing when laying flat and with ambulation for last 3-4 weeks. Per daughter patient had mild leaky valve in May however it became moderate to severe in september. Her valve is being monitored by her cardiologist Dr. Rodriguez. Daughter does not know which valve is leaky. Patient denies any melena or BRBPR. Reports her stools are purple due to medication called Aurixa. Patient denies cough, fever, headache.     On admission received one unit of pRBC with improvement in HgB. Fecal occult positive. Patient to follow up with nephrologist and PCP as outpatient for further monitoring and evaluation.  Routine HD was performed inpatient.  Echocardiogram showed  normal left ventricular systolic function. No segmental wall motion abnormalities. Moderate curcumferentlal pericardial effusion, which is partially organized. No evidence of diastolic right heart collapse. Moderate pulmonary hypertension.      On 10/13/2020, discussed with attending, patient is medically cleared and optimized for discharge today. All medications were reviewed with attending, and sent to mutually agreed upon pharmacy.   79 y/o F with PMH of HTN, "leaky valve" and ESRD on HD who presents to ED for transfusion given recent progressively worsening SOB. Patient is Wolof speaking and history obtained from daughter. Per daughter patient's baseline hgb is 9-10. About 2 weeks ago epogen was stopped by nephrologist given uncontrolled BP. Since epogen discontinuation patient's hgb has been dropping. Last week it was 9.1 and 3 days ago it was found to be 7.5 and outpt providers recommended admission for transfusion. Patient also has noted difficulty with breathing when laying flat and with ambulation for last 3-4 weeks. Per daughter patient had mild leaky valve in May however it became moderate to severe in september. Her valve is being monitored by her cardiologist Dr. Rodriguez. Daughter does not know which valve is leaky. Patient denies any melena or BRBPR. Reports her stools are purple due to medication called Aurixa. Patient denies cough, fever, headache.     On admission received one unit of pRBC with improvement in HgB. Fecal occult positive. Patient to follow up with nephrologist and PCP as outpatient for further monitoring and evaluation.  Routine HD was performed inpatient.  Echocardiogram showed  normal left ventricular systolic function. No segmental wall motion abnormalities. Moderate curcumferentlal pericardial effusion, which is partially organized. No evidence of diastolic right heart collapse. Moderate pulmonary hypertension.  Patient's blood pressure medications were uptitrated. Hydralazine 25mg TID was added. Pt to continue medication as outpatient.     On 10/13/2020, discussed with attending, patient is medically cleared and optimized for discharge today. All medications were reviewed with attending, and sent to mutually agreed upon pharmacy.

## 2020-10-11 NOTE — DISCHARGE NOTE PROVIDER - NSDCCPCAREPLAN_GEN_ALL_CORE_FT
PRINCIPAL DISCHARGE DIAGNOSIS  Diagnosis: Symptomatic anemia  Assessment and Plan of Treatment: You were found to have low blood counts. You were given a blood transfusion on 10/11/2020. Follow up with your primary care physician for further monitoring of blood counts in 1-2 weeks. Please call to arrange appointment.        SECONDARY DISCHARGE DIAGNOSES  Diagnosis: HTN (hypertension)  Assessment and Plan of Treatment: Follow up with your primary care physician for further monitoring of your blood pressure in 1-2 weeks. Please call to arrange appointment.      Diagnosis: Shortness of breath  Assessment and Plan of Treatment: You came to the hospital complaining of shortness of breath. You had a Echocardiogram done which shows fluid around the heart and mild Mitral regurgitation. Follow up with cardiology within 1-2 weeks of discharge.    Diagnosis: ESRD on dialysis  Assessment and Plan of Treatment: Please follow up with your nephrologist for management, and continue you schedule hemodialysis.       PRINCIPAL DISCHARGE DIAGNOSIS  Diagnosis: Symptomatic anemia  Assessment and Plan of Treatment: You were found to have low blood counts. You were given a blood transfusion on 10/11/2020. Follow up with your primary care physician for further monitoring of blood counts in 1-2 weeks. Please call to arrange appointment.        SECONDARY DISCHARGE DIAGNOSES  Diagnosis: HTN (hypertension)  Assessment and Plan of Treatment: We added hydralazine 25mg three times daily to your blood pressure medication regimen. Continue to take this medication as well as your previous home medications. Follow up with your primary care physician for further monitoring of your blood pressure in 1-2 weeks. Please call to arrange appointment.      Diagnosis: Shortness of breath  Assessment and Plan of Treatment: You came to the hospital complaining of shortness of breath. You had a Echocardiogram done which shows fluid around the heart and mild Mitral regurgitation. Follow up with cardiology within 1-2 weeks of discharge.    Diagnosis: ESRD on dialysis  Assessment and Plan of Treatment: Please follow up with your nephrologist for management, and continue you schedule hemodialysis.

## 2020-10-11 NOTE — DISCHARGE NOTE PROVIDER - NSDCMRMEDTOKEN_GEN_ALL_CORE_FT
amLODIPine 10 mg oral tablet: 1 tab(s) orally once a day  Auryxia: 1  orally  labetalol 100 mg oral tablet: 200 milligram(s) orally 3 times a day  olmesartan 40 mg oral tablet: 1 tab(s) orally once a day  Shayna-Krystyna oral tablet: 1 tab(s) orally once a day   amLODIPine 10 mg oral tablet: 1 tab(s) orally once a day  Auryxia: 1  orally  hydrALAZINE 25 mg oral tablet: 1 tab(s) orally 3 times a day  labetalol 100 mg oral tablet: 2 tab(s) orally 3 times a day   olmesartan 40 mg oral tablet: 1 tab(s) orally once a day  Shayna-Krystyna oral tablet: 1 tab(s) orally once a day

## 2020-10-11 NOTE — CONSULT NOTE ADULT - ASSESSMENT
81 yo female w/ pmhx ESRD on dialysis (MWF) via AVF from [facility] unit, HTN, Tamazight speaking (decline translation services and preferred her daughter  Garrett Turner) admitted with c/o SOB, palpitations, fatigue x few days from symptomatic anemia.  SOB worse at night when laying down and on exertion. Per daughter, last week hemoglobin was 7.5.  Last blood transfusion decades ago after childbirth. Pt's last dialysis Friday.  Pt denies any abd pain, dark stools, dizziness, fever, headache, worsening leg swelling.    ESRD on HD via AVF MWF  from [facility] unit  HD consent in the chart  No plan for HD today  Pt cleared from nephrology for d/c post blood transfusion once medically optimized    HTN  BP elevated   continue home BP medications--losartan, labetalol, amlodipine     Anemia  Hb low, pt symptomatic  plan for pRBCS
EKG: NSR with first degree AVB    Assessment and Plan:    1. SOB  -patient states has had SOB when walking for the past 1.5 months  -has a "leaky valve" as per recent outpatient cardiologist follow up  -pending echo this admission    2. HTN  -BP elevated on admission  -improving  -continue with norvasc, labetalol and losartan  -continue to monitor BP    3. Anemia  -secondary to ESRD. Epogen stopped recently as outpatient  -improved s/p transfusion  -FOBT positive but patient denies any melena or BRBPR  -cont to monitor

## 2020-10-11 NOTE — DISCHARGE NOTE PROVIDER - PROVIDER TOKENS
PROVIDER:[TOKEN:[[id number]]] PROVIDER:[TOKEN:[[id number]]],PROVIDER:[TOKEN:[[id number]]]

## 2020-10-11 NOTE — PROGRESS NOTE ADULT - SUBJECTIVE AND OBJECTIVE BOX
Patient is a 80y old  Female who presents with a chief complaint of SOB (11 Oct 2020 17:19)      INTERVAL HPI/OVERNIGHT EVENTS:  T(C): 36.4 (10-11-20 @ 17:16), Max: 37 (10-11-20 @ 12:47)  HR: 67 (10-11-20 @ 17:16) (65 - 74)  BP: 162/42 (10-11-20 @ 17:16) (144/52 - 182/65)  RR: 18 (10-11-20 @ 17:16) (18 - 18)  SpO2: 98% (10-11-20 @ 17:16) (97% - 100%)  Wt(kg): --  I&O's Summary    10 Oct 2020 07:  -  11 Oct 2020 07:00  --------------------------------------------------------  IN: 600 mL / OUT: 0 mL / NET: 600 mL    11 Oct 2020 07:  -  11 Oct 2020 19:44  --------------------------------------------------------  IN: 240 mL / OUT: 0 mL / NET: 240 mL        LABS:                        8.9    3.49  )-----------( 85       ( 11 Oct 2020 06:34 )             26.5     10-11    132<L>  |  91<L>  |  46<H>  ----------------------------<  98  3.7   |  28  |  5.84<H>    Ca    10.0      11 Oct 2020 06:34  Phos  3.8     10-11  Mg     2.2     10-11    TPro  6.2  /  Alb  4.2  /  TBili  1.0  /  DBili  x   /  AST  25  /  ALT  20  /  AlkPhos  131<H>  10-11      Urinalysis Basic - ( 11 Oct 2020 09:04 )    Color: LIGHT YELLOW / Appearance: CLEAR / S.010 / pH: 8.0  Gluc: 50 / Ketone: NEGATIVE  / Bili: NEGATIVE / Urobili: NORMAL   Blood: NEGATIVE / Protein: 300 / Nitrite: NEGATIVE   Leuk Esterase: NEGATIVE / RBC: 0-2 / WBC 0-2   Sq Epi: OCC / Non Sq Epi: x / Bacteria: NEGATIVE      CAPILLARY BLOOD GLUCOSE            Urinalysis Basic - ( 11 Oct 2020 09:04 )    Color: LIGHT YELLOW / Appearance: CLEAR / S.010 / pH: 8.0  Gluc: 50 / Ketone: NEGATIVE  / Bili: NEGATIVE / Urobili: NORMAL   Blood: NEGATIVE / Protein: 300 / Nitrite: NEGATIVE   Leuk Esterase: NEGATIVE / RBC: 0-2 / WBC 0-2   Sq Epi: OCC / Non Sq Epi: x / Bacteria: NEGATIVE        MEDICATIONS  (STANDING):  amLODIPine   Tablet 10 milliGRAM(s) Oral daily  labetalol 200 milliGRAM(s) Oral three times a day  lidocaine/prilocaine Cream 1 Application(s) Topical <User Schedule>  losartan 100 milliGRAM(s) Oral daily  Nephro-terri 1 Tablet(s) Oral daily    MEDICATIONS  (PRN):          PHYSICAL EXAM:  GENERAL: NAD, well-groomed, well-developed  HEAD:  Atraumatic, Normocephalic  CHEST/LUNG: Clear to percussion bilaterally; No rales, rhonchi, wheezing, or rubs  HEART: Regular rate and rhythm; No murmurs, rubs, or gallops  ABDOMEN: Soft, Nontender, Nondistended; Bowel sounds present  EXTREMITIES:  2+ Peripheral Pulses, No clubbing, cyanosis, or edema  LYMPH: No lymphadenopathy noted  SKIN: No rashes or lesions    Care Discussed with Consultants/Other Providers [x ] YES  [ ] NO

## 2020-10-11 NOTE — PROGRESS NOTE ADULT - SUBJECTIVE AND OBJECTIVE BOX
[facility]   MD Kyler Cuevas MD, D.O. Ruoru Wong, PA    From 7 AM - 5 PM:  OFFICE: [phone]  Dr. Adame cell: [phone]  Dr. Montgomery cell: [phone]  Dr. Durant cell: [phone]  TOÑA Gonzalez cell: [phone]    From 5 PM - 7 AM: Answering Service: [phone]  Date of service: 10-11-20 @ 15:26    RENAL FOLLOW UP NOTE  --------------------------------------------------------------------------------  HPI    Pt seen and examined at bedside.       PAST HISTORY  --------------------------------------------------------------------------------  No significant changes to PMH, PSH, FHx, SHx, unless otherwise noted    ALLERGIES & MEDICATIONS  --------------------------------------------------------------------------------  Allergies    alcohol swabs (Unknown)  IV contrast (Urticaria)  No Known Drug Allergies    Intolerances      Standing Inpatient Medications  amLODIPine   Tablet 10 milliGRAM(s) Oral daily  labetalol 200 milliGRAM(s) Oral three times a day  losartan 100 milliGRAM(s) Oral daily  Nephro-terri 1 Tablet(s) Oral daily    PRN Inpatient Medications      REVIEW OF SYSTEMS  --------------------------------------------------------------------------------  General: no fever  CVS: no chest pain  RESP: no sob, no cough  ABD: no abdominal pain  : no dysuria  MSK: no edema     VITALS/PHYSICAL EXAM  --------------------------------------------------------------------------------  T(C): 37 (10-11-20 @ 12:47), Max: 37 (10-11-20 @ 12:47)  HR: 65 (10-11-20 @ 12:47) (65 - 74)  BP: 158/46 (10-11-20 @ 12:47) (144/52 - 182/65)  RR: 18 (10-11-20 @ 12:47) (18 - 18)  SpO2: 100% (10-11-20 @ 12:47) (97% - 100%)  Wt(kg): --  Height (cm): 170.2 (10-10-20 @ 12:53)  Weight (kg): 53.7 (10-10-20 @ 12:53)  BMI (kg/m2): 18.5 (10-10-20 @ 12:53)  BSA (m2): 1.62 (10-10-20 @ 12:53)      10-10-20 @ 07:01  -  10-11-20 @ 07:00  --------------------------------------------------------  IN: 600 mL / OUT: 0 mL / NET: 600 mL      Physical Exam:  	Gen: NAD  	HEENT: MMM  	Pulm: CTA B/L  	CV: S1S2  	Abd: Soft, +BS  	Ext: No LE edema B/L                      Neuro: Awake   	Skin: Warm and Dry   	Vascular access: +AVF    LABS/STUDIES  --------------------------------------------------------------------------------              8.9    3.49  >-----------<  85       [10-11-20 @ 06:34]              26.5     132  |  91  |  46  ----------------------------<  98      [10-11-20 @ 06:34]  3.7   |  28  |  5.84        Ca     10.0     [10-11-20 @ 06:34]      Mg     2.2     [10-11-20 @ 06:34]      Phos  3.8     [10-11-20 @ 06:34]    TPro  6.2  /  Alb  4.2  /  TBili  1.0  /  DBili  x   /  AST  25  /  ALT  20  /  AlkPhos  131  [10-11-20 @ 06:34]          Creatinine Trend:  SCr 5.84 [10-11 @ 06:34]  SCr 4.37 [10-10 @ 08:40]    Urinalysis - [10-11-20 @ 09:04]      Color LIGHT YELLOW / Appearance CLEAR / SG 1.010 / pH 8.0      Gluc 50 / Ketone NEGATIVE  / Bili NEGATIVE / Urobili NORMAL       Blood NEGATIVE / Protein 300 / Leuk Est NEGATIVE / Nitrite NEGATIVE      RBC 0-2 / WBC 0-2 / Hyaline NEGATIVE / Gran  / Sq Epi OCC / Non Sq Epi  / Bacteria NEGATIVE      Iron 152, TIBC 211, %sat --      [10-10-20 @ 10:35]  Ferritin 2109      [10-11-20 @ 06:34]  TSH 2.22      [10-10-20 @ 10:35]    HBsAg NEGATIVE      [10-10-20 @ 17:05]

## 2020-10-11 NOTE — DISCHARGE NOTE PROVIDER - CARE PROVIDER_API CALL
Judit Rodriguez  CARDIOVASCULAR DISEASE  [address], [address]  [place] [address]  Phone: [phone]  Fax: [phone]  Follow Up Time:    Judit Rodriguez  CARDIOVASCULAR DISEASE  [address], [address]  [place] [address]  Phone: [phone]  Fax: [phone]  Follow Up Time:     Magui Durant (DO)  Nephrology  [address] [place] [address]  Phone: [phone]  Fax: [phone]  Follow Up Time:

## 2020-10-11 NOTE — DISCHARGE NOTE PROVIDER - PROVIDER RX CONTACT NUMBER
Gentle cleansing twice daily.    Bacitracin and bandaid.    Antibiotics as directed.    Support athletic socks as discussed to help with swelling.    Elevation of LE at home    Follow up with primary care provider early next week and sooner if worse.    Follow-up with your primary care provider next week and as needed.    Indications for emergent return to emergency department discussed with patient, who verbalized good understanding and agreement.  Patient understands the limitations of today's evaluation.         Patient Education     Cellulitis  Cellulitis is an infection of the deep layers of skin. A break in the skin, such as a cut or scratch, can let bacteria under the skin. If the bacteria get to deep layers of the skin, it can be serious. If not treated, cellulitis can get into the bloodstream and lymph nodes. The infection can then spread throughout the body. This causes serious illness.  Cellulitis causes the affected skin to become red, swollen, warm, and sore. The reddened areas have a visible border. An open sore may leak fluid (pus). You may have a fever, chills, and pain.  Cellulitis is treated with antibiotics taken for 7 to 10 days. An open sore may be cleaned and covered with cool wet gauze. Symptoms should get better 1 to 2 days after treatment is started. Make sure to take all the antibiotics for the full number of days until they are gone. Keep taking the medicine even if your symptoms go away.  Home care  Follow these tips:    Limit the use of the part of your body with cellulitis.     If the infection is on your leg, keep your leg raised while sitting. This will help to reduce swelling.    Take all of the antibiotic medicine exactly as directed until it is gone. Do not miss any doses, especially during the first 7 days. Don t stop taking the medicine when your symptoms get better.    Keep the affected area clean and dry.    Wash your hands with soap and warm water before and after touching  your skin. Anyone else who touches your skin should also wash his or her hands. Don't share towels.  Follow-up care  Follow up with your healthcare provider, or as advised. If your infection does not go away on the first antibiotic, your healthcare provider will prescribe a different one.  When to seek medical advice  Call your healthcare provider right away if any of these occur:    Red areas that spread    Swelling or pain that gets worse    Fluid leaking from the skin (pus)    Fever higher of 100.4  F (38.0  C) or higher after 2 days on antibiotics  Date Last Reviewed: 9/1/2016 2000-2018 [employer]. [address] [place] [address]. All rights reserved. This information is not intended as a substitute for professional medical care. Always follow your healthcare professional's instructions.           Patient Education     Leg Swelling in Both Legs    Swelling of the feet, ankles, and legs is called edema. It is caused by excess fluid that has collected in the tissues. Extra fluid in the body settles in the lowest part because of gravity. This is why the legs and feet are most affected.  Some of the causes for edema include:    Disease of the heart like congestive heart failure    Standing or sitting for long periods of time    Infection of the feet or legs    Blood pooling in the veins of your legs (venous insufficiency)    Dilated veins in your lower leg (varicose veins)    Garters or other clothing that is tight on your legs. This will cause blood to pool in your legs because the clothing limits blood flow.    Some medicines such as hormones like birth control pills, some blood pressure medicines like calcium channel blockers (amlodipine) and steroids, some antidepressants like MAO inhibitors and tricyclics    Menstrual periods that cause you to retain fluids    Many types of renal disease    Liver failure or cirrhosis    Pregnancy, some swelling is normal, but a sudden increase in leg  swelling or weight gain can be a sign of a dangerous complication of pregnancy    Poor nutrition    Thyroid disease  Medical treatment will depend on what is causing the swelling in your legs. Your healthcare provider may prescribe water pills (diuretics) to get rid of the extra fluid.  Home care  Follow these guidelines when caring for yourself at home:    Don't wear clothing like garters that is tight on your legs.    Keep your legs up while lying or sitting.    If infection, injury, or recent surgery is causing the swelling, stay off your legs as much as possible until symptoms get better.    If your healthcare provider says that your leg swelling is caused by venous insufficiency or varicose veins, don't sit or  one place for long periods of time. Take breaks and walk about every few hours. Brisk walking is a good exercise. It helps circulate the blood that has collected in your leg. Talk with your provider about using support stockings to stop daytime leg swelling.    If your provider says that heart disease is causing your leg swelling, follow a low-salt diet to stop extra fluid from staying in your body. You may also need medicine.  Follow-up care  Follow up with your healthcare provider, or as advised.  When to seek medical advice  Call your healthcare provider right away if any of these occur:    New shortness of breath or chest pain    Shortness of breath or chest pain that gets worse    Swelling in both legs or ankles that gets worse    Swelling of the abdomen    Redness, warmth, or swelling in one leg    Fever of 100.4 F (38 C) or higher, or as directed by your healthcare provider    Yellow color to your skin or eyes    Rapid, unexplained weight gain    Having to sleep upright or use an increased number of pillows  Date Last Reviewed: 3/31/2016    9743-5252 [employer]. [address] [place] [address], [place] [address]. All rights reserved. This information is not intended as a substitute  for professional medical care. Always follow your healthcare professional's instructions.            [phone]

## 2020-10-11 NOTE — PROVIDER CONTACT NOTE (OTHER) - BACKGROUND
Anemia  HTN  HD
Anemia s/p 1 unit PRBC  HTN  HD
Patient admitted for weakness, dypsnea requiring two units PRBCs.
Patient has been hypertensive since admission.

## 2020-10-11 NOTE — PROVIDER CONTACT NOTE (OTHER) - ASSESSMENT
HTN   BP: 174/63  Asyptomatic, resting in bed
HTN   BP: 182/66  Asyptomatic, resting in bed
Patient asymptomatic at this time.
Patient asymptomatic.

## 2020-10-12 LAB
ANION GAP SERPL CALC-SCNC: 15 MMO/L — HIGH (ref 7–14)
BUN SERPL-MCNC: 59 MG/DL — HIGH (ref 7–23)
CALCIUM SERPL-MCNC: 9.7 MG/DL — SIGNIFICANT CHANGE UP (ref 8.4–10.5)
CHLORIDE SERPL-SCNC: 90 MMOL/L — LOW (ref 98–107)
CO2 SERPL-SCNC: 27 MMOL/L — SIGNIFICANT CHANGE UP (ref 22–31)
CREAT SERPL-MCNC: 7.72 MG/DL — HIGH (ref 0.5–1.3)
GLUCOSE SERPL-MCNC: 106 MG/DL — HIGH (ref 70–99)
HCT VFR BLD CALC: 28 % — LOW (ref 34.5–45)
HGB BLD-MCNC: 9.1 G/DL — LOW (ref 11.5–15.5)
MAGNESIUM SERPL-MCNC: 2.3 MG/DL — SIGNIFICANT CHANGE UP (ref 1.6–2.6)
MCHC RBC-ENTMCNC: 31.8 PG — SIGNIFICANT CHANGE UP (ref 27–34)
MCHC RBC-ENTMCNC: 32.5 % — SIGNIFICANT CHANGE UP (ref 32–36)
MCV RBC AUTO: 97.9 FL — SIGNIFICANT CHANGE UP (ref 80–100)
NRBC # FLD: 0 K/UL — SIGNIFICANT CHANGE UP (ref 0–0)
PHOSPHATE SERPL-MCNC: 4.1 MG/DL — SIGNIFICANT CHANGE UP (ref 2.5–4.5)
PLATELET # BLD AUTO: 86 K/UL — LOW (ref 150–400)
PMV BLD: 11.3 FL — SIGNIFICANT CHANGE UP (ref 7–13)
POTASSIUM SERPL-MCNC: 4.1 MMOL/L — SIGNIFICANT CHANGE UP (ref 3.5–5.3)
POTASSIUM SERPL-SCNC: 4.1 MMOL/L — SIGNIFICANT CHANGE UP (ref 3.5–5.3)
RBC # BLD: 2.86 M/UL — LOW (ref 3.8–5.2)
RBC # FLD: 14.4 % — SIGNIFICANT CHANGE UP (ref 10.3–14.5)
SODIUM SERPL-SCNC: 132 MMOL/L — LOW (ref 135–145)
WBC # BLD: 4.85 K/UL — SIGNIFICANT CHANGE UP (ref 3.8–10.5)
WBC # FLD AUTO: 4.85 K/UL — SIGNIFICANT CHANGE UP (ref 3.8–10.5)

## 2020-10-12 RX ADMIN — Medication 1 TABLET(S): at 14:54

## 2020-10-12 RX ADMIN — AMLODIPINE BESYLATE 10 MILLIGRAM(S): 2.5 TABLET ORAL at 06:20

## 2020-10-12 RX ADMIN — Medication 200 MILLIGRAM(S): at 14:54

## 2020-10-12 RX ADMIN — Medication 30 MILLILITER(S): at 00:23

## 2020-10-12 RX ADMIN — LOSARTAN POTASSIUM 100 MILLIGRAM(S): 100 TABLET, FILM COATED ORAL at 06:20

## 2020-10-12 RX ADMIN — Medication 200 MILLIGRAM(S): at 06:20

## 2020-10-12 RX ADMIN — Medication 200 MILLIGRAM(S): at 21:22

## 2020-10-12 NOTE — PROGRESS NOTE ADULT - SUBJECTIVE AND OBJECTIVE BOX
[facility]   MD BARBARA HENSON DO ANAM SIDDIQUI ANGELA WONG, [place]    TEL:  OFFICE: [phone]  DR OSEGUERA CELL: [phone]  DR. MITCHELL CELL: [phone]  DR. MORENO CELL: [phone]  CHARU ROWLAND CELL: [phone]    From 5pm-7am Answering Service [phone]    -- RENAL FOLLOW UP NOTE ---Date of Service 10-12-20 @ 13:23    Patient is a 80y old  Female who presents with a chief complaint of SOB (12 Oct 2020 10:42)      Patient seen and examined in HD. No chest pain/sob    VITALS:  T(F): 98.1 (10-12-20 @ 11:25), Max: 98.2 (10-12-20 @ 06:17)  HR: 68 (10-12-20 @ 11:25)  BP: 153/72 (10-12-20 @ 11:25)  RR: 16 (10-12-20 @ 11:25)  SpO2: 98% (10-12-20 @ 06:17)  Wt(kg): --  flow 350    10-11 @ 07:01  -  10-12 @ 07:00  --------------------------------------------------------  IN: 480 mL / OUT: 0 mL / NET: 480 mL          PHYSICAL EXAM:  Constitutional: NAD  Neck: No JVD  Respiratory: CTAB, no wheezes, rales or rhonchi  Cardiovascular: S1, S2, RRR  Gastrointestinal: BS+, soft, NT/ND  Extremities: No peripheral edema    Hospital Medications:   MEDICATIONS  (STANDING):  amLODIPine   Tablet 10 milliGRAM(s) Oral daily  labetalol 200 milliGRAM(s) Oral three times a day  lidocaine/prilocaine Cream 1 Application(s) Topical <User Schedule>  losartan 100 milliGRAM(s) Oral daily  Nephro-terri 1 Tablet(s) Oral daily  ondansetron Injectable 4 milliGRAM(s) IV Push once      LABS:  10-12    132<L>  |  90<L>  |  59<H>  ----------------------------<  106<H>  4.1   |  27  |  7.72<H>    Ca    9.7      12 Oct 2020 06:40  Phos  4.1     10-12  Mg     2.3     10-12    TPro  6.2  /  Alb  4.2  /  TBili  1.0  /  DBili      /  AST  25  /  ALT  20  /  AlkPhos  131<H>  10-11    Creatinine Trend: 7.72 <--, 5.84 <--, 4.37 <--    Phosphorus Level, Serum: 4.1 mg/dL (10-12 @ 06:40)                              9.1    4.85  )-----------( 86       ( 12 Oct 2020 06:40 )             28.0     Urine Studies:  Urinalysis - [10-11-20 @ 09:04]      Color LIGHT YELLOW / Appearance CLEAR / SG 1.010 / pH 8.0      Gluc 50 / Ketone NEGATIVE  / Bili NEGATIVE / Urobili NORMAL       Blood NEGATIVE / Protein 300 / Leuk Est NEGATIVE / Nitrite NEGATIVE      RBC 0-2 / WBC 0-2 / Hyaline NEGATIVE / Gran  / Sq Epi OCC / Non Sq Epi  / Bacteria NEGATIVE      Iron 152, TIBC 211, %sat --      [10-10-20 @ 10:35]  Ferritin 2109      [10-11-20 @ 06:34]  TSH 2.22      [10-10-20 @ 10:35]    HBsAg NEGATIVE      [10-10-20 @ 17:05]      RADIOLOGY & ADDITIONAL STUDIES:

## 2020-10-12 NOTE — PROGRESS NOTE ADULT - SUBJECTIVE AND OBJECTIVE BOX
Stevie Zamorano MD  Interventional Cardiology / Endovascular Specialist  [place] Office : [address] [place] [address] [address]. [address]  Tel:   [place] Office : [address] [facility] [place] [address]  Tel: [phone]  Cell : [phone]    Subjective/Overnight events: Patient seen lying in bed comfortably. Denies chest pain, palpitations or further SOB.    MEDICATIONS:  amLODIPine   Tablet 10 milliGRAM(s) Oral daily  labetalol 200 milliGRAM(s) Oral three times a day  losartan 100 milliGRAM(s) Oral daily        ondansetron Injectable 4 milliGRAM(s) IV Push once        lidocaine/prilocaine Cream 1 Application(s) Topical <User Schedule>  Nephro-terri 1 Tablet(s) Oral daily      PAST MEDICAL/SURGICAL HISTORY  PAST MEDICAL & SURGICAL HISTORY:  HTN (hypertension)    Dialysis patient        SOCIAL HISTORY: Substance Use (street drugs): ( x ) never used  (  ) other:    FAMILY HISTORY:      REVIEW OF SYSTEMS:  CONSTITUTIONAL: No fever, weight loss, or fatigue  EYES: No eye pain, visual disturbances, or discharge  ENMT:  No difficulty hearing, tinnitus, vertigo; No sinus or throat pain  BREASTS: No pain, masses, or nipple discharge  GASTROINTESTINAL: No abdominal or epigastric pain. No nausea, vomiting, or hematemesis; No diarrhea or constipation. No melena or hematochezia.  GENITOURINARY: No dysuria, frequency, hematuria, or incontinence  NEUROLOGICAL: No headaches, memory loss, loss of strength, numbness, or tremors  ENDOCRINE: No heat or cold intolerance; No hair loss  MUSCULOSKELETAL: No joint pain or swelling; No muscle, back, or extremity pain  PSYCHIATRIC: No depression, anxiety, mood swings, or difficulty sleeping  HEME/LYMPH: No easy bruising, or bleeding gums  All others negative    PHYSICAL EXAM:  T(C): 36.8 (10-12-20 @ 06:17), Max: 37 (10-11-20 @ 12:47)  HR: 65 (10-12-20 @ 06:17) (65 - 70)  BP: 156/62 (10-12-20 @ 06:44) (141/52 - 174/63)  RR: 18 (10-12-20 @ 06:17) (18 - 18)  SpO2: 98% (10-12-20 @ 06:17) (96% - 100%)  Wt(kg): --  I&O's Summary    11 Oct 2020 07:01  -  12 Oct 2020 07:00  --------------------------------------------------------  IN: 480 mL / OUT: 0 mL / NET: 480 mL            GENERAL: NAD  EYES: EOMI, PERRLA, conjunctiva and sclera clear  ENMT: No tonsillar erythema, exudates, or enlargement  Cardiovascular: Normal S1 S2, No JVD, Holosystolic murmur, No edema  Respiratory: crackles b/l  Gastrointestinal:  Soft, Non-tender, + BS	  Extremities: Normal range of motion, No clubbing, cyanosis or edema  NERVOUS SYSTEM:  Alert & Oriented X3                                    9.1    4.85  )-----------( 86       ( 12 Oct 2020 06:40 )             28.0     10-12    132<L>  |  90<L>  |  59<H>  ----------------------------<  106<H>  4.1   |  27  |  7.72<H>    Ca    9.7      12 Oct 2020 06:40  Phos  4.1     10-12  Mg     2.3     10-12    TPro  6.2  /  Alb  4.2  /  TBili  1.0  /  DBili  x   /  AST  25  /  ALT  20  /  AlkPhos  131<H>  10-11    proBNP:   Lipid Profile:   HgA1c:   TSH:     Consultant(s) Notes Reviewed:  [x ] YES  [ ] NO    Care Discussed with Consultants/Other Providers [ x] YES  [ ] NO    Imaging Personally Reviewed independently:  [x] YES  [ ] NO    All labs, radiologic studies, vitals, orders and medications list reviewed. Patient is seen and examined at bedside. Case discussed with medical team.

## 2020-10-12 NOTE — PROGRESS NOTE ADULT - ASSESSMENT
EKG: NSR with first degree AVB    Assessment and Plan:    1. SOB  -patient states has had SOB when walking for the past 1.5 months  -has a "leaky valve" as per recent outpatient cardiologist follow up  -echo shows minimal MR    2. HTN  -BP elevated on admission  -improving  -continue with norvasc, labetalol and losartan  -continue to monitor BP    3. Anemia  -secondary to ESRD. Epogen stopped recently as outpatient  -improved s/p transfusion  -FOBT positive but patient denies any melena or BRBPR  -cont to monitor EKG: NSR with first degree AVB    Assessment and Plan:    1. SOB  -patient states has had SOB when walking for the past 1.5 months  -has a "leaky valve" as per recent outpatient cardiologist follow up  -echo shows minimal MR, but LA dilated can be underestimating but euvolemic on exam to f/u with op cards   -moderate pericardial effusion chronic as per daughter no signs of tamponade   - Moderate Phtn : 2/2 ESRD     2. HTN  -BP elevated on admission  -improving  -continue with norvasc, labetalol and losartan  -continue to monitor BP    3. Anemia  -secondary to ESRD. Epogen stopped recently as outpatient  -improved s/p transfusion  -FOBT positive but patient denies any melena or BRBPR  -cont to monitor

## 2020-10-12 NOTE — PROGRESS NOTE ADULT - ASSESSMENT
81 yo female w/ pmhx ESRD on dialysis (MWF) via AVF from [place] [facility] unit, HTN, Bel[place]ian speaking (decline translation services and preferred her daughter  Garrett Turner) admitted with c/o SOB, palpitations, fatigue x few days from symptomatic anemia.  SOB worse at night when laying down and on exertion. Per daughter, last week hemoglobin was 7.5.  Last blood transfusion decades ago after childbirth. Pt's last dialysis Friday.  Pt denies any abd pain, dark stools, dizziness, fever, headache, worsening leg swelling.    ESRD on HD via AVF MWF  from [facility]  HD consent in the chart  HD today. seen in HD. patient requested only 2.5 hrs of treatment today. Continue HD as ordered. tolerating well. vitals stable. access working well   HD per schedule    HTN  BP acceptable  continue home BP medications--losartan, labetalol, amlodipine   UF with HD   monitor    Anemia  Hb low, pt symptomatic  s/p prbcs   Improved     Hyponatremia  sec to ESRD  expect improvement post hd  monitor

## 2020-10-12 NOTE — PROGRESS NOTE ADULT - SUBJECTIVE AND OBJECTIVE BOX
roProblem/Plan - 1:  ·  Problem: Dyspnea on exertion.  Plan: -patient reports OLMOS and PND for last 3-4 weeks  -Differential include symptomatic anemia vs valvular abnormality causing CHF  -per daughter baseline hgb 10 about 2 weeks ago. Hgb downtrending since discontinuation of epogen as output.   -s/p 1 unit PRBC in ED. Anticipate post transfuse hgb to be 8.5 or greater. Hold off further transfusion at this time.   -TTE to evaluate EF and valvular pathology. Per daughter worsening "leaky valve" as outpt. Reach out to cardiologist Dr. Rodriguez on Monday for collateral.   -Appears euvolemic currently. Lungs clear. POCUS without blines. CXR unremarkable as well.   -Trops stable; elevated likely 2/2 ESRD. No prior EKG for comparison.     Problem/Plan - 2:  ·  Problem: Anemia.  Plan: -Suspect anemia of chronic disease 2/2 ESRD  -Add on ferretin, iron and TIBC ( not accurate if checked post transfusion)  -Epo stopped few weeks ago due to uncontrolled HTN. Was given Micera as outpt recently.   -Occult positive however no reported melena or BRBPR. Last EGD/colon in 2019 was largely unremarkable per daughter. At this time no overt signs of GIB  -Monitor hgb.   -Can consider GI consult if signs of GIB.     Problem/Plan - 3:  ·  Problem: HTN (hypertension).  Plan: -Uncontrolled as outpt. Elevated here as well  -C/w norvasc 10, olmasartan 40 and labetalol 200TID  -Can uptitrate labetalol if BP remains elevated.     Problem/Plan - 4:  ·  Problem: Pancytopenia. Plan: -Unknown baseline; attempt to get baseline labs from PCP on Monday  -Possible MDS? May need close outpt f/u and possible heme evaluation   -check b12, folate.  Problem/Plan - 5:  ·  Problem: ESRD on dialysis. Plan: -HD per renal  -epo when ok with renal   -needs control of BP first  -Micera given recently as outpt.    Problem/Plan - 6:  Problem: DVT prophylaxis. Plan: SCDs for now.

## 2020-10-13 ENCOUNTER — TRANSCRIPTION ENCOUNTER (OUTPATIENT)
Age: 80
End: 2020-10-13

## 2020-10-13 VITALS
RESPIRATION RATE: 16 BRPM | SYSTOLIC BLOOD PRESSURE: 149 MMHG | TEMPERATURE: 98 F | DIASTOLIC BLOOD PRESSURE: 45 MMHG | HEART RATE: 62 BPM | OXYGEN SATURATION: 99 %

## 2020-10-13 LAB
ANION GAP SERPL CALC-SCNC: 11 MMO/L — SIGNIFICANT CHANGE UP (ref 7–14)
BUN SERPL-MCNC: 31 MG/DL — HIGH (ref 7–23)
CALCIUM SERPL-MCNC: 9.1 MG/DL — SIGNIFICANT CHANGE UP (ref 8.4–10.5)
CHLORIDE SERPL-SCNC: 95 MMOL/L — LOW (ref 98–107)
CO2 SERPL-SCNC: 28 MMOL/L — SIGNIFICANT CHANGE UP (ref 22–31)
CREAT SERPL-MCNC: 4.92 MG/DL — HIGH (ref 0.5–1.3)
GLUCOSE SERPL-MCNC: 92 MG/DL — SIGNIFICANT CHANGE UP (ref 70–99)
HCT VFR BLD CALC: 26.5 % — LOW (ref 34.5–45)
HGB BLD-MCNC: 8.8 G/DL — LOW (ref 11.5–15.5)
MAGNESIUM SERPL-MCNC: 2.2 MG/DL — SIGNIFICANT CHANGE UP (ref 1.6–2.6)
MCHC RBC-ENTMCNC: 32 PG — SIGNIFICANT CHANGE UP (ref 27–34)
MCHC RBC-ENTMCNC: 33.2 % — SIGNIFICANT CHANGE UP (ref 32–36)
MCV RBC AUTO: 96.4 FL — SIGNIFICANT CHANGE UP (ref 80–100)
NRBC # FLD: 0 K/UL — SIGNIFICANT CHANGE UP (ref 0–0)
PHOSPHATE SERPL-MCNC: 3.7 MG/DL — SIGNIFICANT CHANGE UP (ref 2.5–4.5)
PLATELET # BLD AUTO: 93 K/UL — LOW (ref 150–400)
PMV BLD: 10.2 FL — SIGNIFICANT CHANGE UP (ref 7–13)
POTASSIUM SERPL-MCNC: 3.6 MMOL/L — SIGNIFICANT CHANGE UP (ref 3.5–5.3)
POTASSIUM SERPL-SCNC: 3.6 MMOL/L — SIGNIFICANT CHANGE UP (ref 3.5–5.3)
RBC # BLD: 2.75 M/UL — LOW (ref 3.8–5.2)
RBC # FLD: 14.2 % — SIGNIFICANT CHANGE UP (ref 10.3–14.5)
SODIUM SERPL-SCNC: 134 MMOL/L — LOW (ref 135–145)
WBC # BLD: 3.87 K/UL — SIGNIFICANT CHANGE UP (ref 3.8–10.5)
WBC # FLD AUTO: 3.87 K/UL — SIGNIFICANT CHANGE UP (ref 3.8–10.5)

## 2020-10-13 RX ORDER — LABETALOL HCL 100 MG
2 TABLET ORAL
Qty: 180 | Refills: 0
Start: 2020-10-13 | End: 2020-11-11

## 2020-10-13 RX ORDER — LABETALOL HCL 100 MG
200 TABLET ORAL
Qty: 0 | Refills: 0 | DISCHARGE

## 2020-10-13 RX ORDER — HYDRALAZINE HCL 50 MG
25 TABLET ORAL THREE TIMES A DAY
Refills: 0 | Status: DISCONTINUED | OUTPATIENT
Start: 2020-10-13 | End: 2020-10-13

## 2020-10-13 RX ORDER — LABETALOL HCL 100 MG
1 TABLET ORAL
Qty: 0 | Refills: 0 | DISCHARGE
Start: 2020-10-13 | End: 2020-11-11

## 2020-10-13 RX ORDER — HYDRALAZINE HCL 50 MG
1 TABLET ORAL
Qty: 90 | Refills: 0
Start: 2020-10-13 | End: 2020-11-11

## 2020-10-13 RX ADMIN — Medication 25 MILLIGRAM(S): at 13:15

## 2020-10-13 RX ADMIN — LOSARTAN POTASSIUM 100 MILLIGRAM(S): 100 TABLET, FILM COATED ORAL at 05:52

## 2020-10-13 RX ADMIN — Medication 200 MILLIGRAM(S): at 13:15

## 2020-10-13 RX ADMIN — AMLODIPINE BESYLATE 10 MILLIGRAM(S): 2.5 TABLET ORAL at 05:52

## 2020-10-13 RX ADMIN — Medication 1 TABLET(S): at 13:15

## 2020-10-13 RX ADMIN — Medication 200 MILLIGRAM(S): at 05:52

## 2020-10-13 NOTE — PROGRESS NOTE ADULT - ASSESSMENT
81 yo female w/ pmhx ESRD on dialysis (MWF) via AVF from [place] [facility] unit, HTN, Frisian speaking (decline translation services and preferred her daughter  Garrett Turner) admitted with c/o SOB, palpitations, fatigue x few days from symptomatic anemia.  SOB worse at night when laying down and on exertion. Per daughter, last week hemoglobin was 7.5.  Last blood transfusion decades ago after childbirth. Pt's last dialysis Friday.  Pt denies any abd pain, dark stools, dizziness, fever, headache, worsening leg swelling.    ESRD on HD via AVF MWF  from [place] [facility] unit  HD consent in the chart  s/p HD 10/12 tolerated well  HD per schedule    HTN  uncontrolled  continue home BP medications--losartan, labetalol, amlodipine   hydralazine added per cardio. monitor  UF with HD   monitor    Anemia  Hb low, pt symptomatic  s/p prbcs   stable    Hyponatremia  sec to ESRD  expect improvement post hd  monitor

## 2020-10-13 NOTE — PROGRESS NOTE ADULT - SUBJECTIVE AND OBJECTIVE BOX
Stevie Zamorano MD  Interventional Cardiology / Endovascular Specialist  [place] Office : [address] [place] [address]. [address]  Tel:   [place] Office : [address] [facility] [place] [address]  Tel: [phone]  Cell : [phone]    Subjective/Overnight events: Patient seen lying in bed comfortably. Denies chest pain, palpitations or further SOB.  	  MEDICATIONS:  amLODIPine   Tablet 10 milliGRAM(s) Oral daily  labetalol 200 milliGRAM(s) Oral three times a day  losartan 100 milliGRAM(s) Oral daily        ondansetron Injectable 4 milliGRAM(s) IV Push once        lidocaine/prilocaine Cream 1 Application(s) Topical <User Schedule>  Nephro-terri 1 Tablet(s) Oral daily      PAST MEDICAL/SURGICAL HISTORY  PAST MEDICAL & SURGICAL HISTORY:  HTN (hypertension)    Dialysis patient        SOCIAL HISTORY: Substance Use (street drugs): ( x ) never used  (  ) other:    FAMILY HISTORY:      REVIEW OF SYSTEMS:  CONSTITUTIONAL: No fever, weight loss, or fatigue  EYES: No eye pain, visual disturbances, or discharge  ENMT:  No difficulty hearing, tinnitus, vertigo; No sinus or throat pain  BREASTS: No pain, masses, or nipple discharge  GASTROINTESTINAL: No abdominal or epigastric pain. No nausea, vomiting, or hematemesis; No diarrhea or constipation. No melena or hematochezia.  GENITOURINARY: No dysuria, frequency, hematuria, or incontinence  NEUROLOGICAL: No headaches, memory loss, loss of strength, numbness, or tremors  ENDOCRINE: No heat or cold intolerance; No hair loss  MUSCULOSKELETAL: No joint pain or swelling; No muscle, back, or extremity pain  PSYCHIATRIC: No depression, anxiety, mood swings, or difficulty sleeping  HEME/LYMPH: No easy bruising, or bleeding gums  All others negative    PHYSICAL EXAM:  T(C): 36.6 (10-13-20 @ 05:20), Max: 36.9 (10-12-20 @ 21:20)  HR: 67 (10-13-20 @ 05:20) (67 - 71)  BP: 172/62 (10-13-20 @ 05:20) (153/59 - 172/62)  RR: 16 (10-13-20 @ 05:20) (16 - 18)  SpO2: 96% (10-13-20 @ 05:20) (96% - 99%)  Wt(kg): --  I&O's Summary    12 Oct 2020 07:01  -  13 Oct 2020 07:00  --------------------------------------------------------  IN: 840 mL / OUT: 1400 mL / NET: -560 mL        GENERAL: NAD  EYES: EOMI, PERRLA, conjunctiva and sclera clear  ENMT: No tonsillar erythema, exudates, or enlargement  Cardiovascular: Normal S1 S2, No JVD, Holosystolic murmur, No edema  Respiratory: crackles b/l  Gastrointestinal:  Soft, Non-tender, + BS	  Extremities: Normal range of motion, No clubbing, cyanosis or edema  NERVOUS SYSTEM:  Alert & Oriented X3                                  8.8    3.87  )-----------( 93       ( 13 Oct 2020 06:00 )             26.5     10-13    134<L>  |  95<L>  |  31<H>  ----------------------------<  92  3.6   |  28  |  4.92<H>    Ca    9.1      13 Oct 2020 06:00  Phos  3.7     10-13  Mg     2.2     10-13      proBNP:   Lipid Profile:   HgA1c:   TSH:     Consultant(s) Notes Reviewed:  [x ] YES  [ ] NO    Care Discussed with Consultants/Other Providers [ x] YES  [ ] NO    Imaging Personally Reviewed independently:  [x] YES  [ ] NO    All labs, radiologic studies, vitals, orders and medications list reviewed. Patient is seen and examined at bedside. Case discussed with medical team.

## 2020-10-13 NOTE — DISCHARGE NOTE NURSING/CASE MANAGEMENT/SOCIAL WORK - PATIENT PORTAL LINK FT
You can access the FollowMyHealth Patient Portal offered by [facility] by registering at the following website: [email or web address]. By joining [facility]’s FollowMyHealth portal, you will also be able to view your health information using other applications (apps) compatible with our system.

## 2020-10-13 NOTE — PROGRESS NOTE ADULT - ASSESSMENT
EKG: NSR with first degree AVB    Assessment and Plan:    1. SOB  -patient states has had SOB when walking for the past 1.5 months  -has a "leaky valve" as per recent outpatient cardiologist follow up  -echo shows minimal MR, but LA dilated can be underestimating but euvolemic on exam to f/u with op cards   -moderate pericardial effusion chronic as per daughter no signs of tamponade   - Moderate Phtn : 2/2 ESRD     2. HTN  -BP continues to be elevated  -will add hydralazine 25 mg TID  -continue with norvasc, labetalol and losartan  -continue to monitor BP    3. Anemia  -secondary to ESRD. Epogen stopped recently as outpatient  -improved s/p transfusion  -FOBT positive but patient denies any melena or BRBPR  -cont to monitor

## 2020-10-13 NOTE — PROGRESS NOTE ADULT - SUBJECTIVE AND OBJECTIVE BOX
[facility] [place] [facility]   MD BARBARA HENSON DO ANAM SIDDIQUI ANGELA WONG, PA    TEL:  OFFICE: [phone]  DR OSEGUERA CELL: [phone]  DR. MITCHELL CELL: [phone]  DR. MORENO CELL: [phone]  CHARU ROWLAND CELL: [phone]    From 5pm-7am Answering Service [phone]    -- RENAL FOLLOW UP NOTE ---Date of Service 10-13-20 @ 10:34    Patient is a 80y old  Female who presents with a chief complaint of SOB (13 Oct 2020 10:23)      Patient seen and examined at bedside. No chest pain/sob    VITALS:  T(F): 97.8 (10-13-20 @ 05:20), Max: 98.4 (10-12-20 @ 21:20)  HR: 67 (10-13-20 @ 05:20)  BP: 172/62 (10-13-20 @ 05:20)  RR: 16 (10-13-20 @ 05:20)  SpO2: 96% (10-13-20 @ 05:20)  Wt(kg): --    10-12 @ 07:01  -  10-13 @ 07:00  --------------------------------------------------------  IN: 840 mL / OUT: 1400 mL / NET: -560 mL          PHYSICAL EXAM:  Constitutional: NAD  Neck: No JVD  Respiratory: CTAB, no wheezes, rales or rhonchi  Cardiovascular: S1, S2, RRR  Gastrointestinal: BS+, soft, NT/ND  Extremities: No peripheral edema    Hospital Medications:   MEDICATIONS  (STANDING):  amLODIPine   Tablet 10 milliGRAM(s) Oral daily  hydrALAZINE 25 milliGRAM(s) Oral three times a day  labetalol 200 milliGRAM(s) Oral three times a day  lidocaine/prilocaine Cream 1 Application(s) Topical <User Schedule>  losartan 100 milliGRAM(s) Oral daily  Nephro-terri 1 Tablet(s) Oral daily  ondansetron Injectable 4 milliGRAM(s) IV Push once      LABS:  10-13    134<L>  |  95<L>  |  31<H>  ----------------------------<  92  3.6   |  28  |  4.92<H>    Ca    9.1      13 Oct 2020 06:00  Phos  3.7     10-13  Mg     2.2     10-13      Creatinine Trend: 4.92 <--, 7.72 <--, 5.84 <--, 4.37 <--    Phosphorus Level, Serum: 3.7 mg/dL (10-13 @ 06:00)                              8.8    3.87  )-----------( 93       ( 13 Oct 2020 06:00 )             26.5     Urine Studies:  Urinalysis - [10-11-20 @ 09:04]      Color LIGHT YELLOW / Appearance CLEAR / SG 1.010 / pH 8.0      Gluc 50 / Ketone NEGATIVE  / Bili NEGATIVE / Urobili NORMAL       Blood NEGATIVE / Protein 300 / Leuk Est NEGATIVE / Nitrite NEGATIVE      RBC 0-2 / WBC 0-2 / Hyaline NEGATIVE / Gran  / Sq Epi OCC / Non Sq Epi  / Bacteria NEGATIVE      Iron 152, TIBC 211, %sat --      [10-10-20 @ 10:35]  Ferritin 2109      [10-11-20 @ 06:34]  TSH 2.22      [10-10-20 @ 10:35]    HBsAg NEGATIVE      [10-10-20 @ 17:05]      RADIOLOGY & ADDITIONAL STUDIES:

## 2020-10-28 ENCOUNTER — INPATIENT (INPATIENT)
Facility: HOSPITAL | Age: 80
LOS: 6 days | Discharge: NURSING, REHAB OR LONG-TERM CARE | End: 2020-11-04
Attending: INTERNAL MEDICINE | Admitting: INTERNAL MEDICINE
Payer: MEDICARE

## 2020-10-28 VITALS
TEMPERATURE: 95 F | HEIGHT: 67 IN | RESPIRATION RATE: 18 BRPM | HEART RATE: 64 BPM | SYSTOLIC BLOOD PRESSURE: 150 MMHG | DIASTOLIC BLOOD PRESSURE: 70 MMHG

## 2020-10-28 DIAGNOSIS — R26.2 DIFFICULTY IN WALKING, NOT ELSEWHERE CLASSIFIED: ICD-10-CM

## 2020-10-28 PROBLEM — I10 ESSENTIAL (PRIMARY) HYPERTENSION: Chronic | Status: ACTIVE | Noted: 2020-10-10

## 2020-10-28 LAB
ALBUMIN SERPL ELPH-MCNC: 4.9 G/DL — SIGNIFICANT CHANGE UP (ref 3.3–5)
ALP SERPL-CCNC: 173 U/L — HIGH (ref 40–120)
ALT FLD-CCNC: 21 U/L — SIGNIFICANT CHANGE UP (ref 4–33)
ANION GAP SERPL CALC-SCNC: 17 MMO/L — HIGH (ref 7–14)
APTT BLD: 35.8 SEC — SIGNIFICANT CHANGE UP (ref 27–36.3)
AST SERPL-CCNC: 31 U/L — SIGNIFICANT CHANGE UP (ref 4–32)
BASOPHILS # BLD AUTO: 0.03 K/UL — SIGNIFICANT CHANGE UP (ref 0–0.2)
BASOPHILS NFR BLD AUTO: 0.3 % — SIGNIFICANT CHANGE UP (ref 0–2)
BILIRUB SERPL-MCNC: 0.6 MG/DL — SIGNIFICANT CHANGE UP (ref 0.2–1.2)
BLD GP AB SCN SERPL QL: NEGATIVE — SIGNIFICANT CHANGE UP
BUN SERPL-MCNC: 48 MG/DL — HIGH (ref 7–23)
CALCIUM SERPL-MCNC: 10.4 MG/DL — SIGNIFICANT CHANGE UP (ref 8.4–10.5)
CHLORIDE SERPL-SCNC: 93 MMOL/L — LOW (ref 98–107)
CO2 SERPL-SCNC: 27 MMOL/L — SIGNIFICANT CHANGE UP (ref 22–31)
CREAT SERPL-MCNC: 7.11 MG/DL — HIGH (ref 0.5–1.3)
EOSINOPHIL # BLD AUTO: 0.03 K/UL — SIGNIFICANT CHANGE UP (ref 0–0.5)
EOSINOPHIL NFR BLD AUTO: 0.3 % — SIGNIFICANT CHANGE UP (ref 0–6)
GLUCOSE SERPL-MCNC: 112 MG/DL — HIGH (ref 70–99)
HCT VFR BLD CALC: 29.8 % — LOW (ref 34.5–45)
HGB BLD-MCNC: 9.2 G/DL — LOW (ref 11.5–15.5)
IMM GRANULOCYTES NFR BLD AUTO: 0.5 % — SIGNIFICANT CHANGE UP (ref 0–1.5)
INR BLD: 0.94 — SIGNIFICANT CHANGE UP (ref 0.88–1.16)
LYMPHOCYTES # BLD AUTO: 0.6 K/UL — LOW (ref 1–3.3)
LYMPHOCYTES # BLD AUTO: 6 % — LOW (ref 13–44)
MCHC RBC-ENTMCNC: 30.9 % — LOW (ref 32–36)
MCHC RBC-ENTMCNC: 32.1 PG — SIGNIFICANT CHANGE UP (ref 27–34)
MCV RBC AUTO: 103.8 FL — HIGH (ref 80–100)
MONOCYTES # BLD AUTO: 0.32 K/UL — SIGNIFICANT CHANGE UP (ref 0–0.9)
MONOCYTES NFR BLD AUTO: 3.2 % — SIGNIFICANT CHANGE UP (ref 2–14)
NEUTROPHILS # BLD AUTO: 8.89 K/UL — HIGH (ref 1.8–7.4)
NEUTROPHILS NFR BLD AUTO: 89.7 % — HIGH (ref 43–77)
NRBC # FLD: 0 K/UL — SIGNIFICANT CHANGE UP (ref 0–0)
PLATELET # BLD AUTO: 122 K/UL — LOW (ref 150–400)
PMV BLD: 9.2 FL — SIGNIFICANT CHANGE UP (ref 7–13)
POTASSIUM SERPL-MCNC: 3.7 MMOL/L — SIGNIFICANT CHANGE UP (ref 3.5–5.3)
POTASSIUM SERPL-SCNC: 3.7 MMOL/L — SIGNIFICANT CHANGE UP (ref 3.5–5.3)
PROT SERPL-MCNC: 7 G/DL — SIGNIFICANT CHANGE UP (ref 6–8.3)
PROTHROM AB SERPL-ACNC: 10.8 SEC — SIGNIFICANT CHANGE UP (ref 10.6–13.6)
RBC # BLD: 2.87 M/UL — LOW (ref 3.8–5.2)
RBC # FLD: 14.9 % — HIGH (ref 10.3–14.5)
RH IG SCN BLD-IMP: POSITIVE — SIGNIFICANT CHANGE UP
SODIUM SERPL-SCNC: 137 MMOL/L — SIGNIFICANT CHANGE UP (ref 135–145)
WBC # BLD: 9.92 K/UL — SIGNIFICANT CHANGE UP (ref 3.8–10.5)
WBC # FLD AUTO: 9.92 K/UL — SIGNIFICANT CHANGE UP (ref 3.8–10.5)

## 2020-10-28 PROCEDURE — 73700 CT LOWER EXTREMITY W/O DYE: CPT | Mod: 26,RT

## 2020-10-28 PROCEDURE — 99285 EMERGENCY DEPT VISIT HI MDM: CPT | Mod: 25

## 2020-10-28 PROCEDURE — 99223 1ST HOSP IP/OBS HIGH 75: CPT

## 2020-10-28 PROCEDURE — 76937 US GUIDE VASCULAR ACCESS: CPT | Mod: 26

## 2020-10-28 PROCEDURE — 72100 X-RAY EXAM L-S SPINE 2/3 VWS: CPT | Mod: 26

## 2020-10-28 PROCEDURE — 93010 ELECTROCARDIOGRAM REPORT: CPT

## 2020-10-28 PROCEDURE — 73502 X-RAY EXAM HIP UNI 2-3 VIEWS: CPT | Mod: 26,RT

## 2020-10-28 PROCEDURE — 36410 VNPNXR 3YR/> PHY/QHP DX/THER: CPT

## 2020-10-28 PROCEDURE — 76376 3D RENDER W/INTRP POSTPROCES: CPT | Mod: 26

## 2020-10-28 RX ORDER — ACETAMINOPHEN 500 MG
650 TABLET ORAL ONCE
Refills: 0 | Status: COMPLETED | OUTPATIENT
Start: 2020-10-28 | End: 2020-10-28

## 2020-10-28 RX ADMIN — Medication 650 MILLIGRAM(S): at 20:55

## 2020-10-28 NOTE — CONSULT NOTE ADULT - SUBJECTIVE AND OBJECTIVE BOX
[facility] [place] [facility]   MD BARBARA HENSON DO ANAM SIDDIQUI ANGELA WONG, PA        TEL:  OFFICE: [phone]  DR OSEGUERA CELL: [phone]  DR. MITCHELL CELL: [phone]  DR. MORENO CELL: [phone]  CHARU ROWLAND CELL: [phone]    From 5pm-7am answering service [phone]    --- INITIAL RENAL CONSULT NOTE ---date of service 10-28-20 @ 16:57    HPI:  79 yo female Croatian speaking but able to communicate with english w/ pmhx ESRD on dialysis (MWF) via AVF from [place] [facility] unit, HTN present with mechanical fall. Per patient her shoes got stuck and she tripped and fell in the elevated now with right hip pain. denied dizziness, headache, head injury, cp or sob, LOC. last HD monday is due for dialysis today          Allergies:  alcohol swabs (Unknown)  IV contrast (Urticaria)  No Known Drug Allergies      PAST MEDICAL & SURGICAL HISTORY:  HTN (hypertension)    Dialysis patient        Home Medications Reviewed    Hospital Medications:   MEDICATIONS  (STANDING):      SOCIAL HISTORY:  Denies ETOh, Smoking,     FAMILY HISTORY:      REVIEW OF SYSTEMS:  CONSTITUTIONAL: No weakness, fevers or chills  EYES/ENT: No visual changes;  No vertigo or throat pain   NECK: No pain or stiffness  RESPIRATORY: No cough, wheezing, hemoptysis; No shortness of breath  CARDIOVASCULAR: No chest pain or palpitations.  GASTROINTESTINAL: No abdominal or epigastric pain. No nausea, vomiting, or hematemesis; No diarrhea or constipation. No melena or hematochezia.  GENITOURINARY: No dysuria, frequency, foamy urine, urinary urgency, incontinence or hematuria  NEUROLOGICAL: No numbness or weakness  SKIN: No itching, burning, rashes, or lesions   VASCULAR: No bilateral lower extremity edema.   All other review of systems is negative unless indicated above.    VITALS:  T(F): 95 (10-28-20 @ 16:39), Max: 95 (10-28-20 @ 16:39)  HR: 64 (10-28-20 @ 16:39)  BP: 150/70 (10-28-20 @ 16:39)  RR: 18 (10-28-20 @ 16:39)  SpO2: --  Wt(kg): --    Height (cm): 170.2 (10-28 @ 16:39)    PHYSICAL EXAM:  Constitutional: NAD  HEENT: anicteric sclera, oropharynx clear, MMM  Neck: No JVD  Respiratory: CTAB, no wheezes, rales or rhonchi  Cardiovascular: S1, S2, RRR  Gastrointestinal: BS+, soft, NT/ND  Extremities: No cyanosis or clubbing. No peripheral edema  Neurological: A/O x 3, no focal deficits  Psychiatric: Normal mood, normal affect  : No CVA tenderness. No mesa.   Skin: No rashes  Vascular Access: left avf    LABS:        Creatinine Trend:     Urine Studies:        RADIOLOGY & ADDITIONAL STUDIES:

## 2020-10-28 NOTE — ED ADULT NURSE NOTE - OBJECTIVE STATEMENT
Received PT to 23, A&Ox3, c/o right lower back and hip pain, s/p fall earlier today. PT states she tripped by elevator in her apartment, denies hitting head or losing consciousness. PMHx dialysis PT (left arm fistula), HTN. Denies N/V/D, dizziness, fatigue, SOB, chest pain, dysuria. Breathing even and unlabored, right pedal pulse present, right leg able to push and pull with restriction, no edema noted. Unable to put IV, PA aware. Contacting ultrasound team for assistance. Bed in lowest position, call bell within reach. Will continue to monitor.

## 2020-10-28 NOTE — ED ADULT TRIAGE NOTE - CHIEF COMPLAINT QUOTE
PT C/O right hip and right back pain S/P mechanical fall 1 pm today. As per family fall was mechanical fall, denies head trauma, LOC. PHX: HTN, ESRD with dialysis M/W/F last completed Monday with L AV fistula, HTN.

## 2020-10-28 NOTE — H&P ADULT - NSHPREVIEWOFSYSTEMS_GEN_ALL_CORE
Review of Systems:   CONSTITUTIONAL: No fever or chills  EYES: No eye pain, visual disturbances, or discharge  ENMT:  No difficulty hearing, no throat pain  NECK: No pain or stiffness  RESPIRATORY: No cough, No shortness of breath  CARDIOVASCULAR: No chest pain, occasional chest pressure. no palpitations, dizziness, or leg swelling  GASTROINTESTINAL: No abdominal pain, nausea, vomiting or diarrhea  GENITOURINARY: No dysuria, or hematuria  NEUROLOGICAL: No headaches, weakness, or numbness  SKIN: No rashes, or lesions   LYMPH NODES: No enlarged glands  ENDOCRINE: No heat or cold intolerance  MUSCULOSKELETAL: Pain as above   PSYCHIATRIC: No depression or anxiety  HEME/LYMPH: No easy bruising, or bleeding  ALLERGY AND IMMUNOLOGIC: No hives or eczema

## 2020-10-28 NOTE — H&P ADULT - PROBLEM SELECTOR PLAN 1
2/2 Fall  No fractures seen on x-ray  - CT performed, read pending  - Tylenol PRN with oxycodone if pain uncontrolled  - PT

## 2020-10-28 NOTE — ED ADULT NURSE NOTE - NSFALLRSKASSISTTYPE_ED_ALL_ED
----- Message from Josie Bryant sent at 4/20/2020  2:35 PM CDT -----  Pt son Basil can be reached at [phone]    Pt is calling to speak with the nurse concerning appt scheduled on April 30th.    Thank you!      
Spoke with patient's son.  Answered all his questions.  Reviewed all appointments.  He thanked nurse.  
Walking/Standing/Toileting

## 2020-10-28 NOTE — H&P ADULT - HISTORY OF PRESENT ILLNESS
79 y/o F with ESRD on HD (MWF), HTN, GERD presents with hip and back pain after a fall.  Pt reports falling after she lost her balance trying to exit her elevator.  She fell landing on her R hip.  Since falling, she has had severe pain in lower back. and R hip and thigh.  Pain is worse with any movement.  She had partial relief from Tylenol.  She reports some abdominal pain after falling.  Pt reports feeling at baseline otherwise without fever, chills, cough, nausea, dyspnea, or LOC.      In the ED, pt was given Tylenol

## 2020-10-28 NOTE — ED PROVIDER NOTE - ATTENDING CONTRIBUTION TO CARE
80F h/o ESRD on HD (MWF), HTN p/w R hip and lower back pain s/p fall today. Mechanical fall earlier today, fell directly onto hip and now difficult, painful ambulation. Denies head/neck injury or LOC. The pain is alleviated while lying/seating, worse with standing but tolerable, and most severe while walking. No other medical complaints at this time.  Gen: nad   CV: rrr, no murmur  Pulm: clear lungs  Abd: soft, nt, nd  MSK: ttp over R hip without limb shortening or rotation  MDM: 80F h/o ESRD on HD (MWF), HTN p/w R hip and lower back pain s/p fall today - will obtain XR, if inconclusive will get CT hip; pt missed HD, if w/u negative and pt able to ambulate will dw nephrologist to arrange tomorrow vs admit if unable to ambulate

## 2020-10-28 NOTE — H&P ADULT - NSHPLABSRESULTS_GEN_ALL_CORE
10-28    137  |  93<L>  |  48<H>  ----------------------------<  112<H>  3.7   |  27  |  7.11<H>    Ca    10.4      28 Oct 2020 18:45    TPro  7.0  /  Alb  4.9  /  TBili  0.6  /  DBili  x   /  AST  31  /  ALT  21  /  AlkPhos  173<H>  10-28                            9.2    9.92  )-----------( 122      ( 28 Oct 2020 18:45 )             29.8               PT/INR - ( 28 Oct 2020 18:45 )   PT: 10.8 SEC;   INR: 0.94     PTT - ( 28 Oct 2020 18:45 )  PTT:35.8 SEC    < from: Xray Lumbar Spine AP + Lateral (10.28.20 @ 18:15) >    The vertebral body heights are maintained. No acute fracture or subluxation is identified. There are no acute malalignments of the vertebral bodies. Degenerative changes. Calcified and tortuous aortoiliac bifurcation.    < end of copied text >      < from: Xray Hip w/ Pelvis 2 or 3 Views, Right (10.28.20 @ 18:15) >    There is no evidence of acute fracture or dislocation. Hip joint spaces are maintained. Degenerative changes of the partially imaged lumbar spine. Vascular calcifications.    < end of copied text >      EKG tracing reviewed: Sinus with 1st degree AV block

## 2020-10-28 NOTE — ED PROVIDER NOTE - OBJECTIVE STATEMENT
79 y/o predominantly Divehi speaking / limited English speaking female with hx of   presents to the ED c/o R sided hip and lumbar pain s/p fall today. Pt states she sustained a mechanical fall while getting off the elevator in her building and fell directly onto her hip. Denies head/neck injury, LOC, FOOSH, bleeding, inability to weight bear, dizziness or other associated sx. No tx PTA. The pain is alleviated while lying/seating, worse with standing but tolerable, and most severe while walking. No other medical complaints at this time.

## 2020-10-28 NOTE — ED PROVIDER NOTE - CLINICAL SUMMARY MEDICAL DECISION MAKING FREE TEXT BOX
79 y/o female pmh ESRD on HD tths, co mechanical fall x today now w/ R hip pain and difficulty ambulating- labs, xray, ekg, reassess

## 2020-10-28 NOTE — H&P ADULT - NSHPPHYSICALEXAM_GEN_ALL_CORE
Vital Signs Last 24 Hrs  T(C): 36.6 (28 Oct 2020 23:39), Max: 36.8 (28 Oct 2020 20:44)  T(F): 97.9 (28 Oct 2020 23:39), Max: 98.3 (28 Oct 2020 20:44)  HR: 68 (28 Oct 2020 23:39) (64 - 72)  BP: 152/45 (28 Oct 2020 23:39) (150/70 - 165/38)  BP(mean): --  RR: 18 (28 Oct 2020 23:39) (18 - 20)  SpO2: 97% (28 Oct 2020 23:39) (97% - 98%)    PHYSICAL EXAM:  GENERAL: No Acute Distress  EYES: conjunctiva and sclera clear  ENMT: Moist mucous membranes   NECK: Supple  PULMONARY: Clear to auscultation bilaterally  CARDIAC: Regular rate and rhythm; No murmurs, rubs, or gallops  GASTROINTESTINAL: Abdomen soft, Nontender, Nondistended; Bowel sounds normal  EXTREMITIES:   No clubbing, cyanosis, or pedal edema  PSYCH: Normal Affect, Normal Behavior  NEUROLOGY:   - Mental status A&O x 3  - strenght testing limited by pain   SKIN: No rashes or lesions  MUSCULOSKELETAL: Pain with movement of RLE.  TTP in lower back and R hip.

## 2020-10-29 DIAGNOSIS — N18.6 END STAGE RENAL DISEASE: ICD-10-CM

## 2020-10-29 DIAGNOSIS — M25.559 PAIN IN UNSPECIFIED HIP: ICD-10-CM

## 2020-10-29 DIAGNOSIS — I10 ESSENTIAL (PRIMARY) HYPERTENSION: ICD-10-CM

## 2020-10-29 LAB
ALBUMIN SERPL ELPH-MCNC: 3.7 G/DL — SIGNIFICANT CHANGE UP (ref 3.3–5)
ALP SERPL-CCNC: 142 U/L — HIGH (ref 40–120)
ALT FLD-CCNC: 14 U/L — SIGNIFICANT CHANGE UP (ref 4–33)
ANION GAP SERPL CALC-SCNC: 13 MMO/L — SIGNIFICANT CHANGE UP (ref 7–14)
AST SERPL-CCNC: 19 U/L — SIGNIFICANT CHANGE UP (ref 4–32)
B PERT DNA SPEC QL NAA+PROBE: SIGNIFICANT CHANGE UP
BASOPHILS # BLD AUTO: 0.03 K/UL — SIGNIFICANT CHANGE UP (ref 0–0.2)
BASOPHILS NFR BLD AUTO: 0.6 % — SIGNIFICANT CHANGE UP (ref 0–2)
BILIRUB SERPL-MCNC: 0.4 MG/DL — SIGNIFICANT CHANGE UP (ref 0.2–1.2)
BLD GP AB SCN SERPL QL: NEGATIVE — SIGNIFICANT CHANGE UP
BUN SERPL-MCNC: 58 MG/DL — HIGH (ref 7–23)
C PNEUM DNA SPEC QL NAA+PROBE: SIGNIFICANT CHANGE UP
CALCIUM SERPL-MCNC: 9.4 MG/DL — SIGNIFICANT CHANGE UP (ref 8.4–10.5)
CHLORIDE SERPL-SCNC: 93 MMOL/L — LOW (ref 98–107)
CO2 SERPL-SCNC: 27 MMOL/L — SIGNIFICANT CHANGE UP (ref 22–31)
CREAT SERPL-MCNC: 8.28 MG/DL — HIGH (ref 0.5–1.3)
EOSINOPHIL # BLD AUTO: 0.1 K/UL — SIGNIFICANT CHANGE UP (ref 0–0.5)
EOSINOPHIL NFR BLD AUTO: 1.9 % — SIGNIFICANT CHANGE UP (ref 0–6)
FLUAV H1 2009 PAND RNA SPEC QL NAA+PROBE: SIGNIFICANT CHANGE UP
FLUAV H1 RNA SPEC QL NAA+PROBE: SIGNIFICANT CHANGE UP
FLUAV H3 RNA SPEC QL NAA+PROBE: SIGNIFICANT CHANGE UP
FLUAV SUBTYP SPEC NAA+PROBE: SIGNIFICANT CHANGE UP
FLUBV RNA SPEC QL NAA+PROBE: SIGNIFICANT CHANGE UP
GLUCOSE SERPL-MCNC: 132 MG/DL — HIGH (ref 70–99)
HADV DNA SPEC QL NAA+PROBE: SIGNIFICANT CHANGE UP
HCOV PNL SPEC NAA+PROBE: SIGNIFICANT CHANGE UP
HCT VFR BLD CALC: 23.9 % — LOW (ref 34.5–45)
HCT VFR BLD CALC: 24.8 % — LOW (ref 34.5–45)
HGB BLD-MCNC: 7.5 G/DL — LOW (ref 11.5–15.5)
HGB BLD-MCNC: 7.9 G/DL — LOW (ref 11.5–15.5)
HMPV RNA SPEC QL NAA+PROBE: SIGNIFICANT CHANGE UP
HPIV1 RNA SPEC QL NAA+PROBE: SIGNIFICANT CHANGE UP
HPIV2 RNA SPEC QL NAA+PROBE: SIGNIFICANT CHANGE UP
HPIV3 RNA SPEC QL NAA+PROBE: SIGNIFICANT CHANGE UP
HPIV4 RNA SPEC QL NAA+PROBE: SIGNIFICANT CHANGE UP
IMM GRANULOCYTES NFR BLD AUTO: 0.6 % — SIGNIFICANT CHANGE UP (ref 0–1.5)
LYMPHOCYTES # BLD AUTO: 0.5 K/UL — LOW (ref 1–3.3)
LYMPHOCYTES # BLD AUTO: 9.5 % — LOW (ref 13–44)
MCHC RBC-ENTMCNC: 31.4 % — LOW (ref 32–36)
MCHC RBC-ENTMCNC: 31.9 % — LOW (ref 32–36)
MCHC RBC-ENTMCNC: 32.3 PG — SIGNIFICANT CHANGE UP (ref 27–34)
MCHC RBC-ENTMCNC: 32.5 PG — SIGNIFICANT CHANGE UP (ref 27–34)
MCV RBC AUTO: 102.1 FL — HIGH (ref 80–100)
MCV RBC AUTO: 103 FL — HIGH (ref 80–100)
MONOCYTES # BLD AUTO: 0.29 K/UL — SIGNIFICANT CHANGE UP (ref 0–0.9)
MONOCYTES NFR BLD AUTO: 5.5 % — SIGNIFICANT CHANGE UP (ref 2–14)
NEUTROPHILS # BLD AUTO: 4.33 K/UL — SIGNIFICANT CHANGE UP (ref 1.8–7.4)
NEUTROPHILS NFR BLD AUTO: 81.9 % — HIGH (ref 43–77)
NRBC # FLD: 0 K/UL — SIGNIFICANT CHANGE UP (ref 0–0)
NRBC # FLD: 0 K/UL — SIGNIFICANT CHANGE UP (ref 0–0)
PLATELET # BLD AUTO: 89 K/UL — LOW (ref 150–400)
PLATELET # BLD AUTO: 90 K/UL — LOW (ref 150–400)
PMV BLD: 10 FL — SIGNIFICANT CHANGE UP (ref 7–13)
PMV BLD: 10.1 FL — SIGNIFICANT CHANGE UP (ref 7–13)
POTASSIUM SERPL-MCNC: 3.7 MMOL/L — SIGNIFICANT CHANGE UP (ref 3.5–5.3)
POTASSIUM SERPL-SCNC: 3.7 MMOL/L — SIGNIFICANT CHANGE UP (ref 3.5–5.3)
PROT SERPL-MCNC: 5.3 G/DL — LOW (ref 6–8.3)
RAPID RVP RESULT: SIGNIFICANT CHANGE UP
RBC # BLD: 2.32 M/UL — LOW (ref 3.8–5.2)
RBC # BLD: 2.43 M/UL — LOW (ref 3.8–5.2)
RBC # FLD: 15 % — HIGH (ref 10.3–14.5)
RBC # FLD: 15.1 % — HIGH (ref 10.3–14.5)
RH IG SCN BLD-IMP: POSITIVE — SIGNIFICANT CHANGE UP
RSV RNA SPEC QL NAA+PROBE: SIGNIFICANT CHANGE UP
RV+EV RNA SPEC QL NAA+PROBE: SIGNIFICANT CHANGE UP
SARS-COV-2 RNA SPEC QL NAA+PROBE: SIGNIFICANT CHANGE UP
SODIUM SERPL-SCNC: 133 MMOL/L — LOW (ref 135–145)
WBC # BLD: 5.17 K/UL — SIGNIFICANT CHANGE UP (ref 3.8–10.5)
WBC # BLD: 5.28 K/UL — SIGNIFICANT CHANGE UP (ref 3.8–10.5)
WBC # FLD AUTO: 5.17 K/UL — SIGNIFICANT CHANGE UP (ref 3.8–10.5)
WBC # FLD AUTO: 5.28 K/UL — SIGNIFICANT CHANGE UP (ref 3.8–10.5)

## 2020-10-29 PROCEDURE — 72190 X-RAY EXAM OF PELVIS: CPT | Mod: 26

## 2020-10-29 PROCEDURE — 73552 X-RAY EXAM OF FEMUR 2/>: CPT | Mod: 26,RT

## 2020-10-29 PROCEDURE — 99223 1ST HOSP IP/OBS HIGH 75: CPT

## 2020-10-29 PROCEDURE — 99232 SBSQ HOSP IP/OBS MODERATE 35: CPT

## 2020-10-29 RX ORDER — LOSARTAN POTASSIUM 100 MG/1
100 TABLET, FILM COATED ORAL DAILY
Refills: 0 | Status: DISCONTINUED | OUTPATIENT
Start: 2020-10-29 | End: 2020-11-04

## 2020-10-29 RX ORDER — LABETALOL HCL 100 MG
100 TABLET ORAL THREE TIMES A DAY
Refills: 0 | Status: DISCONTINUED | OUTPATIENT
Start: 2020-10-29 | End: 2020-11-04

## 2020-10-29 RX ORDER — ACETAMINOPHEN 500 MG
650 TABLET ORAL EVERY 6 HOURS
Refills: 0 | Status: DISCONTINUED | OUTPATIENT
Start: 2020-10-29 | End: 2020-11-04

## 2020-10-29 RX ORDER — HYDRALAZINE HCL 50 MG
25 TABLET ORAL THREE TIMES A DAY
Refills: 0 | Status: DISCONTINUED | OUTPATIENT
Start: 2020-10-29 | End: 2020-11-04

## 2020-10-29 RX ORDER — AMLODIPINE BESYLATE 2.5 MG/1
10 TABLET ORAL DAILY
Refills: 0 | Status: DISCONTINUED | OUTPATIENT
Start: 2020-10-29 | End: 2020-11-04

## 2020-10-29 RX ORDER — OXYCODONE HYDROCHLORIDE 5 MG/1
5 TABLET ORAL ONCE
Refills: 0 | Status: DISCONTINUED | OUTPATIENT
Start: 2020-10-29 | End: 2020-10-29

## 2020-10-29 RX ORDER — PANTOPRAZOLE SODIUM 20 MG/1
40 TABLET, DELAYED RELEASE ORAL
Refills: 0 | Status: DISCONTINUED | OUTPATIENT
Start: 2020-10-29 | End: 2020-11-04

## 2020-10-29 RX ORDER — HEPARIN SODIUM 5000 [USP'U]/ML
5000 INJECTION INTRAVENOUS; SUBCUTANEOUS THREE TIMES A DAY
Refills: 0 | Status: DISCONTINUED | OUTPATIENT
Start: 2020-10-29 | End: 2020-11-04

## 2020-10-29 RX ORDER — OXYCODONE HYDROCHLORIDE 5 MG/1
5 TABLET ORAL EVERY 8 HOURS
Refills: 0 | Status: DISCONTINUED | OUTPATIENT
Start: 2020-10-29 | End: 2020-11-04

## 2020-10-29 RX ORDER — ERYTHROPOIETIN 10000 [IU]/ML
10000 INJECTION, SOLUTION INTRAVENOUS; SUBCUTANEOUS
Refills: 0 | Status: COMPLETED | OUTPATIENT
Start: 2020-10-29 | End: 2020-10-31

## 2020-10-29 RX ORDER — SUCRALFATE 1 G
1 TABLET ORAL
Refills: 0 | Status: DISCONTINUED | OUTPATIENT
Start: 2020-10-29 | End: 2020-10-30

## 2020-10-29 RX ADMIN — OXYCODONE HYDROCHLORIDE 5 MILLIGRAM(S): 5 TABLET ORAL at 06:08

## 2020-10-29 RX ADMIN — Medication 100 MILLIGRAM(S): at 21:00

## 2020-10-29 RX ADMIN — Medication 650 MILLIGRAM(S): at 17:24

## 2020-10-29 RX ADMIN — AMLODIPINE BESYLATE 10 MILLIGRAM(S): 2.5 TABLET ORAL at 17:24

## 2020-10-29 RX ADMIN — HEPARIN SODIUM 5000 UNIT(S): 5000 INJECTION INTRAVENOUS; SUBCUTANEOUS at 06:08

## 2020-10-29 RX ADMIN — Medication 650 MILLIGRAM(S): at 19:11

## 2020-10-29 RX ADMIN — OXYCODONE HYDROCHLORIDE 5 MILLIGRAM(S): 5 TABLET ORAL at 19:19

## 2020-10-29 RX ADMIN — Medication 650 MILLIGRAM(S): at 06:09

## 2020-10-29 RX ADMIN — ERYTHROPOIETIN 10000 UNIT(S): 10000 INJECTION, SOLUTION INTRAVENOUS; SUBCUTANEOUS at 13:50

## 2020-10-29 RX ADMIN — HEPARIN SODIUM 5000 UNIT(S): 5000 INJECTION INTRAVENOUS; SUBCUTANEOUS at 21:01

## 2020-10-29 RX ADMIN — Medication 1 TABLET(S): at 16:12

## 2020-10-29 RX ADMIN — LOSARTAN POTASSIUM 100 MILLIGRAM(S): 100 TABLET, FILM COATED ORAL at 17:26

## 2020-10-29 RX ADMIN — HEPARIN SODIUM 5000 UNIT(S): 5000 INJECTION INTRAVENOUS; SUBCUTANEOUS at 16:12

## 2020-10-29 RX ADMIN — Medication 25 MILLIGRAM(S): at 21:00

## 2020-10-29 RX ADMIN — Medication 100 MILLIGRAM(S): at 16:13

## 2020-10-29 RX ADMIN — PANTOPRAZOLE SODIUM 40 MILLIGRAM(S): 20 TABLET, DELAYED RELEASE ORAL at 17:26

## 2020-10-29 RX ADMIN — OXYCODONE HYDROCHLORIDE 5 MILLIGRAM(S): 5 TABLET ORAL at 02:05

## 2020-10-29 RX ADMIN — Medication 25 MILLIGRAM(S): at 16:12

## 2020-10-29 RX ADMIN — OXYCODONE HYDROCHLORIDE 5 MILLIGRAM(S): 5 TABLET ORAL at 06:55

## 2020-10-29 RX ADMIN — OXYCODONE HYDROCHLORIDE 5 MILLIGRAM(S): 5 TABLET ORAL at 11:05

## 2020-10-29 NOTE — PROGRESS NOTE ADULT - ASSESSMENT
81 yo female Romansh speaking but able to communicate with english w/ pmhx ESRD on dialysis (MWF) via AVF from [place] [facility] unit, HTN present with mechanical fall    ESRD on HD via AVF MWF  from [place] [facility] unit  HD consent in the chart  last HD 10/26  Seen in HD today. Continue HD as ordered. tolerating well. vitals stable. access working well   next session likely sat then will switch back to MWF schedule next week    HTN  monitor  continue home BP medications--losartan, labetalol, amlodipine hydralazine    Anemia  s/p recent transfusion  transfuse to keep hb>8  epo with dialysis  monitor    fall/hip pain  work up per team  f/u ortho

## 2020-10-29 NOTE — PATIENT PROFILE ADULT - OVER THE PAST TWO WEEKS HAVE YOU FELT DOWN, DEPRESSED OR HOPELESS?
yes/Pt states she has been feeling depressed but is not on medication. Pt denies suicidal ideation, denies wanting or attempting to hurt self. no

## 2020-10-29 NOTE — PROGRESS NOTE ADULT - SUBJECTIVE AND OBJECTIVE BOX
[facility] [place] [facility]   MD BARBARA HENSON DO ANAM SIDDIQUI ANGELA WONG, PA    TEL:  OFFICE: [phone]  DR OSEGUERA CELL: [phone]  DR. MITCHELL CELL: [phone]  DR. MORENO CELL: [phone]  CHARU ROWLAND CELL: [phone]    From 5pm-7am Answering Service [phone]    -- RENAL FOLLOW UP NOTE ---Date of Service 10-29-20 @ 12:33    Patient is a 80y old  Female who presents with a chief complaint of Fall (28 Oct 2020 23:46)      Patient seen and examined in HD. No chest pain/sob    VITALS:  T(F): 97.7 (10-29-20 @ 11:20), Max: 98.3 (10-28-20 @ 20:44)  HR: 69 (10-29-20 @ 11:20)  BP: 159/50 (10-29-20 @ 11:20)  RR: 18 (10-29-20 @ 11:20)  SpO2: 95% (10-29-20 @ 04:40)  Wt(kg): --  flow 350    Height (cm): 160 (10-29 @ 04:40)  Weight (kg): 48.1 (10-29 @ 04:40)  BMI (kg/m2): 18.8 (10-29 @ 04:40)  BSA (m2): 1.48 (10-29 @ 04:40)    PHYSICAL EXAM:  Constitutional: NAD  Neck: No JVD  Respiratory: CTAB, no wheezes, rales or rhonchi  Cardiovascular: S1, S2, RRR  Gastrointestinal: BS+, soft, NT/ND  Extremities: No peripheral edema    Hospital Medications:   MEDICATIONS  (STANDING):  amLODIPine   Tablet 10 milliGRAM(s) Oral daily  heparin   Injectable 5000 Unit(s) SubCutaneous three times a day  hydrALAZINE 25 milliGRAM(s) Oral three times a day  labetalol 100 milliGRAM(s) Oral three times a day  losartan 100 milliGRAM(s) Oral daily  multivitamin 1 Tablet(s) Oral daily  pantoprazole    Tablet 40 milliGRAM(s) Oral before breakfast      LABS:  10-29    133<L>  |  93<L>  |  58<H>  ----------------------------<  132<H>  3.7   |  27  |  8.28<H>    Ca    9.4      29 Oct 2020 11:25    TPro  5.3<L>  /  Alb  3.7  /  TBili  0.4  /  DBili      /  AST  19  /  ALT  14  /  AlkPhos  142<H>  10-29    Creatinine Trend: 8.28 <--, 7.11 <--    Albumin, Serum: 3.7 g/dL (10-29 @ 11:25)  Albumin, Serum: 4.9 g/dL (10-28 @ 18:45)                              7.5    5.28  )-----------( 90       ( 29 Oct 2020 11:25 )             23.9     Urine Studies:  Urinalysis - [10-11-20 @ 09:04]      Color LIGHT YELLOW / Appearance CLEAR / SG 1.010 / pH 8.0      Gluc 50 / Ketone NEGATIVE  / Bili NEGATIVE / Urobili NORMAL       Blood NEGATIVE / Protein 300 / Leuk Est NEGATIVE / Nitrite NEGATIVE      RBC 0-2 / WBC 0-2 / Hyaline NEGATIVE / Gran  / Sq Epi OCC / Non Sq Epi  / Bacteria NEGATIVE      Iron 152, TIBC 211, %sat --      [10-10-20 @ 10:35]  Ferritin 2109      [10-11-20 @ 06:34]  TSH 2.22      [10-10-20 @ 10:35]    HBsAg NEGATIVE      [10-10-20 @ 17:05]      RADIOLOGY & ADDITIONAL STUDIES:

## 2020-10-29 NOTE — CONSULT NOTE ADULT - SUBJECTIVE AND OBJECTIVE BOX
Patient is a 81 y/o Greek speaking Female with ESRD on HD, HTN, GERD presents with hip and back pain after a fall.  Pt reports falling after she lost her balance trying to exit her elevator.  She fell landing on her R side.  Since falling, she has had severe pain in lower back. and R Pelvic Pain worse with any movement.  She had partial relief from Tylenol. Patient denies any CP, SOB, DIzziness, N, V, D, HA at moment. States no pain elsewhere.    Past Medical History:    HTN (hypertension)  Dialysis patient  Inability to ambulate due to right hip  Essential hypertension  ESRD (end stage renal disease) on dialysis  Hip pain  No significant past surgical history    Allergies    alcohol swabs (Unknown)  IV contrast (Urticaria)  No Known Drug Allergies    Intolerances                            7.9    5.17  )-----------( 89       ( 29 Oct 2020 13:05 )             24.8     29 Oct 2020 11:25    133    |  93     |  58     ----------------------------<  132    3.7     |  27     |  8.28     Ca    9.4        29 Oct 2020 11:25    TPro  5.3    /  Alb  3.7    /  TBili  0.4    /  DBili  x      /  AST  19     /  ALT  14     /  AlkPhos  142    29 Oct 2020 11:25    PT/INR - ( 28 Oct 2020 18:45 )   PT: 10.8 SEC;   INR: 0.94          PTT - ( 28 Oct 2020 18:45 )  PTT:35.8 SEC  Vital Signs Last 24 Hrs  T(C): 36.6 (10-29-20 @ 14:05), Max: 36.8 (10-28-20 @ 20:44)  T(F): 97.9 (10-29-20 @ 14:05), Max: 98.3 (10-28-20 @ 20:44)  HR: 74 (10-29-20 @ 14:05) (64 - 97)  BP: 148/52 (10-29-20 @ 14:05) (148/52 - 165/38)  BP(mean): --  RR: 16 (10-29-20 @ 14:05) (16 - 20)  SpO2: 95% (10-29-20 @ 04:40) (95% - 98%)    Imaging: < from: Xray Hip w/ Pelvis 2 or 3 Views, Right (10.28.20 @ 18:15) >  FINDINGS:    There is no evidence of acute fracture or dislocation. Hip joint spaces are maintained. Degenerative changes of the partially imaged lumbar spine. Vascular calcifications.    IMPRESSION:    No evidence of acutefracture or dislocation.    < end of copied text >    < from: CT Hip No Cont, Right (10.28.20 @ 21:48) >    IMPRESSION:    Acute, minimally displaced fractures of the junction of the right suprapubic ramus/acetabulum and right inferior pubic ramus.  Acute sacral fracture with mildly displaced fracture fragment anteriorly. MRI may be obtained if there is clinical suspicion for additional fracture.    Prominent bladder wall, which may be due to partial distention. Recommend clinical correlation to assess urinary tract infection.    Additional findings as described.      < end of copied text >      Physical Exam  General: NAD, Alert, Awake and oriented  RLE: Skin intact. Motor intact distally 5/5 BL LE. Sensation is intact to light touch in the distal extremity. Negative Log Roll Bilaterally. Neg heel Strike bilaterally. Unable to independently SLR BL. With Assistance, patient able to SLR without pain. Tenderness to palpation over pelvis R. No tenderness to palpation over femur.     A/P: 80y Female with R Pubic Rami/Acetabular fracture R inferior Pubic Rami Fx.     -Pain control / Analgesia  -WBAT   -PT/OT  -No surgical intervention required at moment.  -Imaging and consult discussed with Dr Colon, agrees with Plan.   - May follow up with Dr Colon outpatient when discharged from hospital. call office to make an appointment. [phone].   Patient is a 79 y/o Latvian speaking Female with ESRD on HD, HTN, GERD presents with hip and back pain after a fall.  Pt reports falling after she lost her balance trying to exit her elevator.  She fell landing on her R side.  Since falling, she has had severe pain in lower back. and R Pelvic Pain worse with any movement.  She had partial relief from Tylenol. Patient denies any CP, SOB, DIzziness, N, V, D, HA at moment. States no pain elsewhere.    Past Medical History:    HTN (hypertension)  Dialysis patient  Inability to ambulate due to right hip  Essential hypertension  ESRD (end stage renal disease) on dialysis  Hip pain  No significant past surgical history    Allergies    alcohol swabs (Unknown)  IV contrast (Urticaria)  No Known Drug Allergies    Intolerances                            7.9    5.17  )-----------( 89       ( 29 Oct 2020 13:05 )             24.8     29 Oct 2020 11:25    133    |  93     |  58     ----------------------------<  132    3.7     |  27     |  8.28     Ca    9.4        29 Oct 2020 11:25    TPro  5.3    /  Alb  3.7    /  TBili  0.4    /  DBili  x      /  AST  19     /  ALT  14     /  AlkPhos  142    29 Oct 2020 11:25    PT/INR - ( 28 Oct 2020 18:45 )   PT: 10.8 SEC;   INR: 0.94          PTT - ( 28 Oct 2020 18:45 )  PTT:35.8 SEC  Vital Signs Last 24 Hrs  T(C): 36.6 (10-29-20 @ 14:05), Max: 36.8 (10-28-20 @ 20:44)  T(F): 97.9 (10-29-20 @ 14:05), Max: 98.3 (10-28-20 @ 20:44)  HR: 74 (10-29-20 @ 14:05) (64 - 97)  BP: 148/52 (10-29-20 @ 14:05) (148/52 - 165/38)  BP(mean): --  RR: 16 (10-29-20 @ 14:05) (16 - 20)  SpO2: 95% (10-29-20 @ 04:40) (95% - 98%)    Imaging: < from: Xray Hip w/ Pelvis 2 or 3 Views, Right (10.28.20 @ 18:15) >  FINDINGS:    There is no evidence of acute fracture or dislocation. Hip joint spaces are maintained. Degenerative changes of the partially imaged lumbar spine. Vascular calcifications.    IMPRESSION:    No evidence of acutefracture or dislocation.    < end of copied text >    < from: CT Hip No Cont, Right (10.28.20 @ 21:48) >    IMPRESSION:    Acute, minimally displaced fractures of the junction of the right suprapubic ramus/acetabulum and right inferior pubic ramus.  Acute sacral fracture with mildly displaced fracture fragment anteriorly. MRI may be obtained if there is clinical suspicion for additional fracture.    Prominent bladder wall, which may be due to partial distention. Recommend clinical correlation to assess urinary tract infection.    Additional findings as described.      < end of copied text >      Physical Exam  General: NAD, Alert, Awake and oriented  RLE: Skin intact. Motor intact distally 5/5 BL LE. Sensation is intact to light touch in the distal extremity. Negative Log Roll Bilaterally. Neg heel Strike bilaterally. Unable to independently SLR BL. With Assistance, patient able to SLR without pain. Tenderness to palpation over pelvis R. No tenderness to palpation over femur.     A/P: 80y Female with R Pubic Rami/Acetabular fracture R inferior Pubic Rami Fx.     -Pain control / Analgesia  -WBAT   -PT/OT  -FU Femur XR  -No surgical intervention required at moment.  -Imaging and consult discussed with Dr Colon, agrees with Plan.   - May follow up with Dr Colon outpatient when discharged from hospital. call office to make an appointment. [phone].   Patient is a 79 y/o Serbian speaking Female with ESRD on HD, HTN, GERD presents with hip and back pain after a fall.  Pt reports falling after she lost her balance trying to exit her elevator.  She fell landing on her R side.  Since falling, she has had severe pain in lower back. and R Pelvic Pain worse with any movement.  She had partial relief from Tylenol. Patient denies any CP, SOB, DIzziness, N, V, D, HA at moment. States no pain elsewhere.    Past Medical History:    HTN (hypertension)  Dialysis patient  Inability to ambulate due to right hip  Essential hypertension  ESRD (end stage renal disease) on dialysis  Hip pain  No significant past surgical history    Allergies    alcohol swabs (Unknown)  IV contrast (Urticaria)  No Known Drug Allergies    Intolerances                            7.9    5.17  )-----------( 89       ( 29 Oct 2020 13:05 )             24.8     29 Oct 2020 11:25    133    |  93     |  58     ----------------------------<  132    3.7     |  27     |  8.28     Ca    9.4        29 Oct 2020 11:25    TPro  5.3    /  Alb  3.7    /  TBili  0.4    /  DBili  x      /  AST  19     /  ALT  14     /  AlkPhos  142    29 Oct 2020 11:25    PT/INR - ( 28 Oct 2020 18:45 )   PT: 10.8 SEC;   INR: 0.94          PTT - ( 28 Oct 2020 18:45 )  PTT:35.8 SEC  Vital Signs Last 24 Hrs  T(C): 36.6 (10-29-20 @ 14:05), Max: 36.8 (10-28-20 @ 20:44)  T(F): 97.9 (10-29-20 @ 14:05), Max: 98.3 (10-28-20 @ 20:44)  HR: 74 (10-29-20 @ 14:05) (64 - 97)  BP: 148/52 (10-29-20 @ 14:05) (148/52 - 165/38)  BP(mean): --  RR: 16 (10-29-20 @ 14:05) (16 - 20)  SpO2: 95% (10-29-20 @ 04:40) (95% - 98%)    Imaging: < from: Xray Hip w/ Pelvis 2 or 3 Views, Right (10.28.20 @ 18:15) >  FINDINGS:    There is no evidence of acute fracture or dislocation. Hip joint spaces are maintained. Degenerative changes of the partially imaged lumbar spine. Vascular calcifications.    IMPRESSION:    No evidence of acutefracture or dislocation.    < end of copied text >    < from: CT Hip No Cont, Right (10.28.20 @ 21:48) >    IMPRESSION:    Acute, minimally displaced fractures of the junction of the right suprapubic ramus/acetabulum and right inferior pubic ramus.  Acute sacral fracture with mildly displaced fracture fragment anteriorly. MRI may be obtained if there is clinical suspicion for additional fracture.    Prominent bladder wall, which may be due to partial distention. Recommend clinical correlation to assess urinary tract infection.    Additional findings as described.      < end of copied text >      Physical Exam  General: NAD, Alert, Awake and oriented  RLE: Skin intact. Motor intact distally 5/5 BL LE. Sensation is intact to light touch in the distal extremity. Negative Log Roll Bilaterally. Neg heel Strike bilaterally. Unable to independently SLR BL. With Assistance, patient able to SLR without pain. Tenderness to palpation over pelvis R. No tenderness to palpation over femur.     A/P: 80y Female with R Pubic Rami/Acetabular fracture R inferior Pubic Rami Fx.     -Pain control / Analgesia  -PT/OT  -If femur Xrays negative for fracture may weight bear as tolerated   -No surgical intervention required at moment.  -Imaging and consult discussed with Dr Colon, agrees with Plan.   - May follow up with Dr Colon outpatient when discharged from hospital. call office to make an appointment. [phone].   Patient is a 79 y/o Polish speaking Female with ESRD on HD, HTN, GERD presents with hip and back pain after a fall.  Pt reports falling after she lost her balance trying to exit her elevator.  She fell landing on her R side.  Since falling, she has had severe pain in lower back. and R Pelvic Pain worse with any movement.  She had partial relief from Tylenol. Patient denies any CP, SOB, DIzziness, N, V, D, HA at moment. States no pain elsewhere.    Past Medical History:    HTN (hypertension)  Dialysis patient  Inability to ambulate due to right hip  Essential hypertension  ESRD (end stage renal disease) on dialysis  Hip pain  No significant past surgical history    Allergies    alcohol swabs (Unknown)  IV contrast (Urticaria)  No Known Drug Allergies    Intolerances                            7.9    5.17  )-----------( 89       ( 29 Oct 2020 13:05 )             24.8     29 Oct 2020 11:25    133    |  93     |  58     ----------------------------<  132    3.7     |  27     |  8.28     Ca    9.4        29 Oct 2020 11:25    TPro  5.3    /  Alb  3.7    /  TBili  0.4    /  DBili  x      /  AST  19     /  ALT  14     /  AlkPhos  142    29 Oct 2020 11:25    PT/INR - ( 28 Oct 2020 18:45 )   PT: 10.8 SEC;   INR: 0.94          PTT - ( 28 Oct 2020 18:45 )  PTT:35.8 SEC  Vital Signs Last 24 Hrs  T(C): 36.6 (10-29-20 @ 14:05), Max: 36.8 (10-28-20 @ 20:44)  T(F): 97.9 (10-29-20 @ 14:05), Max: 98.3 (10-28-20 @ 20:44)  HR: 74 (10-29-20 @ 14:05) (64 - 97)  BP: 148/52 (10-29-20 @ 14:05) (148/52 - 165/38)  BP(mean): --  RR: 16 (10-29-20 @ 14:05) (16 - 20)  SpO2: 95% (10-29-20 @ 04:40) (95% - 98%)    Imaging: < from: Xray Hip w/ Pelvis 2 or 3 Views, Right (10.28.20 @ 18:15) >  FINDINGS:    There is no evidence of acute fracture or dislocation. Hip joint spaces are maintained. Degenerative changes of the partially imaged lumbar spine. Vascular calcifications.    IMPRESSION:    No evidence of acutefracture or dislocation.    < end of copied text >    < from: CT Hip No Cont, Right (10.28.20 @ 21:48) >    IMPRESSION:    Acute, minimally displaced fractures of the junction of the right suprapubic ramus/acetabulum and right inferior pubic ramus.  Acute sacral fracture with mildly displaced fracture fragment anteriorly. MRI may be obtained if there is clinical suspicion for additional fracture.    Prominent bladder wall, which may be due to partial distention. Recommend clinical correlation to assess urinary tract infection.    Additional findings as described.      < end of copied text >      Physical Exam  General: NAD, Alert, Awake and oriented  RLE: Skin intact. Motor intact distally 5/5 BL LE. Sensation is intact to light touch in the distal extremity. Negative Log Roll Bilaterally. Neg heel Strike bilaterally. Able to independently SLR BL without pain. Tenderness to palpation over pelvis R. No tenderness to palpation over femur.     A/P: 80y Female with R LC1 type Fx.    -Pain control / Analgesia  -PT/OT  -If femur Xrays negative for fracture may weight bear as tolerated   -No surgical intervention required at moment.  -Imaging and consult discussed with Dr Colon, agrees with Plan.   - May follow up with Dr Colon outpatient when discharged from hospital. call office to make an appointment. [phone].

## 2020-10-29 NOTE — CONSULT NOTE ADULT - SUBJECTIVE AND OBJECTIVE BOX
Stevie Zamorano MD  Interventional Cardiology / Endovascular Specialist  [place] Office : [address] [place] [address] [place] [address]  Tel:   [place] Office : [address] [facility] [place] [address]  Tel: [phone]  Cell : [phone]    HISTORY OF PRESENTING ILLNESS:  79 y/o F with ESRD on HD (MWF), HTN, GERD presents with hip and back pain after a fall.  Pt reports falling after she lost her balance trying to exit her elevator.  She fell landing on her R hip.  Since falling, she has had severe pain in lower back. and R hip and thigh.  Pain is worse with any movement.  She had partial relief from Tylenol.  She reports some abdominal pain after falling.  Pt reports feeling at baseline otherwise without fever, chills, cough, nausea, dyspnea, or LOC.  Patient denies feeling any dizziness, chest pain or SOB prior to fall. Denies any LOC  	  MEDICATIONS:  amLODIPine   Tablet 10 milliGRAM(s) Oral daily  heparin   Injectable 5000 Unit(s) SubCutaneous three times a day  hydrALAZINE 25 milliGRAM(s) Oral three times a day  labetalol 100 milliGRAM(s) Oral three times a day  losartan 100 milliGRAM(s) Oral daily        acetaminophen   Tablet .. 650 milliGRAM(s) Oral every 6 hours PRN  oxyCODONE    IR 5 milliGRAM(s) Oral every 8 hours PRN    pantoprazole    Tablet 40 milliGRAM(s) Oral before breakfast  sucralfate 1 Gram(s) Oral four times a day PRN      epoetin efren-epbx (RETACRIT) Injectable 40678 Unit(s) IV Push <User Schedule>  multivitamin 1 Tablet(s) Oral daily      PAST MEDICAL/SURGICAL HISTORY  PAST MEDICAL & SURGICAL HISTORY:  HTN (hypertension)    Dialysis patient    No significant past surgical history        SOCIAL HISTORY: Substance Use (street drugs): ( x ) never used  (  ) other:    FAMILY HISTORY:  No pertinent family history in first degree relatives        REVIEW OF SYSTEMS:  CONSTITUTIONAL: No fever, weight loss, or fatigue  EYES: No eye pain, visual disturbances, or discharge  ENMT:  No difficulty hearing, tinnitus, vertigo; No sinus or throat pain  BREASTS: No pain, masses, or nipple discharge  GASTROINTESTINAL: No abdominal or epigastric pain. No nausea, vomiting, or hematemesis; No diarrhea or constipation. No melena or hematochezia.  GENITOURINARY: No dysuria, frequency, hematuria, or incontinence  NEUROLOGICAL: No headaches, memory loss, loss of strength, numbness, or tremors  ENDOCRINE: No heat or cold intolerance; No hair loss  MUSCULOSKELETAL: No joint pain or swelling; No muscle, back, or extremity pain  PSYCHIATRIC: No depression, anxiety, mood swings, or difficulty sleeping  HEME/LYMPH: No easy bruising, or bleeding gums  All others negative    PHYSICAL EXAM:  T(C): 36.6 (10-29-20 @ 14:05), Max: 36.8 (10-28-20 @ 20:44)  HR: 74 (10-29-20 @ 14:05) (64 - 97)  BP: 148/52 (10-29-20 @ 14:05) (148/52 - 165/38)  RR: 16 (10-29-20 @ 14:05) (16 - 20)  SpO2: 95% (10-29-20 @ 04:40) (95% - 98%)  Wt(kg): --  I&O's Summary    29 Oct 2020 07:01  -  29 Oct 2020 14:21  --------------------------------------------------------  IN: 400 mL / OUT: 1400 mL / NET: -1000 mL      Height (cm): 160 (10-29 @ 04:40)  Weight (kg): 48.1 (10-29 @ 04:40)  BMI (kg/m2): 18.8 (10-29 @ 04:40)  BSA (m2): 1.48 (10-29 @ 04:40)    GENERAL: NAD, well-groomed, well-developed  EYES: EOMI, PERRLA, conjunctiva and sclera clear  ENMT: No tonsillar erythema, exudates, or enlargement; Moist mucous membranes, Good dentition, No lesions  Cardiovascular: Normal S1 S2, No JVD, No murmurs, No edema  Respiratory: Lungs clear to auscultation	  Gastrointestinal:  Soft, Non-tender, + BS	  Extremities: Normal range of motion, No clubbing, cyanosis or edema  LYMPH: No lymphadenopathy noted  NERVOUS SYSTEM:  Alert & Oriented X3, Good concentration; Motor Strength 5/5 B/L upper and lower extremities; DTRs 2+ intact and symmetric                                    7.9    5.17  )-----------( 89       ( 29 Oct 2020 13:05 )             24.8     10-29    133<L>  |  93<L>  |  58<H>  ----------------------------<  132<H>  3.7   |  27  |  8.28<H>    Ca    9.4      29 Oct 2020 11:25    TPro  5.3<L>  /  Alb  3.7  /  TBili  0.4  /  DBili  x   /  AST  19  /  ALT  14  /  AlkPhos  142<H>  10-29    proBNP:   Lipid Profile:   HgA1c:   TSH:     Consultant(s) Notes Reviewed:  [x ] YES  [ ] NO    Care Discussed with Consultants/Other Providers [ x] YES  [ ] NO    Imaging Personally Reviewed independently:  [x] YES  [ ] NO    All labs, radiologic studies, vitals, orders and medications list reviewed. Patient is seen and examined at bedside. Case discussed with medical team.                 Stevie Zamorano MD  Interventional Cardiology / Endovascular Specialist  [place] Office : [address] [place] [address] [place] [address]  Tel:   [place] Office : [address] [facility] [place] [address]  Tel: [phone]  Cell : [phone]    HISTORY OF PRESENTING ILLNESS:  81 y/o F with ESRD on HD (MWF), HTN, GERD presents with hip and back pain after a fall.  Pt reports falling after she lost her balance trying to exit her elevator.  She fell landing on her R hip.  Since falling, she has had severe pain in lower back. and R hip and thigh.  Pain is worse with any movement.  She had partial relief from Tylenol.  She reports some abdominal pain after falling.  Pt reports feeling at baseline otherwise without fever, chills, cough, nausea, dyspnea, or LOC.  Patient denies feeling any dizziness, chest pain or SOB prior to fall. Denies any LOC  	  MEDICATIONS:  amLODIPine   Tablet 10 milliGRAM(s) Oral daily  heparin   Injectable 5000 Unit(s) SubCutaneous three times a day  hydrALAZINE 25 milliGRAM(s) Oral three times a day  labetalol 100 milliGRAM(s) Oral three times a day  losartan 100 milliGRAM(s) Oral daily        acetaminophen   Tablet .. 650 milliGRAM(s) Oral every 6 hours PRN  oxyCODONE    IR 5 milliGRAM(s) Oral every 8 hours PRN    pantoprazole    Tablet 40 milliGRAM(s) Oral before breakfast  sucralfate 1 Gram(s) Oral four times a day PRN      epoetin efren-epbx (RETACRIT) Injectable 17787 Unit(s) IV Push <User Schedule>  multivitamin 1 Tablet(s) Oral daily      PAST MEDICAL/SURGICAL HISTORY  PAST MEDICAL & SURGICAL HISTORY:  HTN (hypertension)    Dialysis patient    No significant past surgical history        SOCIAL HISTORY: Substance Use (street drugs): ( x ) never used  (  ) other:    FAMILY HISTORY:  No pertinent family history in first degree relatives        REVIEW OF SYSTEMS:  CONSTITUTIONAL: No fever, weight loss, or fatigue  EYES: No eye pain, visual disturbances, or discharge  ENMT:  No difficulty hearing, tinnitus, vertigo; No sinus or throat pain  BREASTS: No pain, masses, or nipple discharge  GASTROINTESTINAL: No abdominal or epigastric pain. No nausea, vomiting, or hematemesis; No diarrhea or constipation. No melena or hematochezia.  GENITOURINARY: No dysuria, frequency, hematuria, or incontinence  NEUROLOGICAL: No headaches, memory loss, loss of strength, numbness, or tremors  ENDOCRINE: No heat or cold intolerance; No hair loss  MUSCULOSKELETAL: No joint pain or swelling; No muscle, back, or extremity pain  PSYCHIATRIC: No depression, anxiety, mood swings, or difficulty sleeping  HEME/LYMPH: No easy bruising, or bleeding gums  All others negative    PHYSICAL EXAM:  T(C): 36.6 (10-29-20 @ 14:05), Max: 36.8 (10-28-20 @ 20:44)  HR: 74 (10-29-20 @ 14:05) (64 - 97)  BP: 148/52 (10-29-20 @ 14:05) (148/52 - 165/38)  RR: 16 (10-29-20 @ 14:05) (16 - 20)  SpO2: 95% (10-29-20 @ 04:40) (95% - 98%)  Wt(kg): --  I&O's Summary    29 Oct 2020 07:01  -  29 Oct 2020 14:21  --------------------------------------------------------  IN: 400 mL / OUT: 1400 mL / NET: -1000 mL      Height (cm): 160 (10-29 @ 04:40)  Weight (kg): 48.1 (10-29 @ 04:40)  BMI (kg/m2): 18.8 (10-29 @ 04:40)  BSA (m2): 1.48 (10-29 @ 04:40)    GENERAL: NAD, well-groomed, well-developed  EYES: EOMI, PERRLA, conjunctiva and sclera clear  ENMT: No tonsillar erythema, exudates, or enlargement  Cardiovascular: Normal S1 S2, No JVD, No murmurs, No edema  Respiratory: Lungs clear to auscultation	  Gastrointestinal:  Soft, Non-tender, + BS	  Extremities: Normal range of motion, No clubbing, cyanosis or edema  NERVOUS SYSTEM:  Alert & Oriented X3                                    7.9    5.17  )-----------( 89       ( 29 Oct 2020 13:05 )             24.8     10-29    133<L>  |  93<L>  |  58<H>  ----------------------------<  132<H>  3.7   |  27  |  8.28<H>    Ca    9.4      29 Oct 2020 11:25    TPro  5.3<L>  /  Alb  3.7  /  TBili  0.4  /  DBili  x   /  AST  19  /  ALT  14  /  AlkPhos  142<H>  10-29    proBNP:   Lipid Profile:   HgA1c:   TSH:     Consultant(s) Notes Reviewed:  [x ] YES  [ ] NO    Care Discussed with Consultants/Other Providers [ x] YES  [ ] NO    Imaging Personally Reviewed independently:  [x] YES  [ ] NO    All labs, radiologic studies, vitals, orders and medications list reviewed. Patient is seen and examined at bedside. Case discussed with medical team.

## 2020-10-29 NOTE — PROGRESS NOTE ADULT - SUBJECTIVE AND OBJECTIVE BOX
Patient is a 80y old  Female who presents with a chief complaint of Fall (29 Oct 2020 14:46)      INTERVAL HPI/OVERNIGHT EVENTS:  T(C): 37.1 (10-29-20 @ 15:34), Max: 37.1 (10-29-20 @ 15:34)  HR: 73 (10-29-20 @ 15:44) (64 - 97)  BP: 168/59 (10-29-20 @ 15:44) (148/52 - 175/54)  RR: 18 (10-29-20 @ 15:34) (16 - 20)  SpO2: 96% (10-29-20 @ 15:34) (95% - 98%)  Wt(kg): --  I&O's Summary    29 Oct 2020 07:01  -  29 Oct 2020 16:37  --------------------------------------------------------  IN: 400 mL / OUT: 1400 mL / NET: -1000 mL        LABS:                        7.9    5.17  )-----------( 89       ( 29 Oct 2020 13:05 )             24.8     10-29    133<L>  |  93<L>  |  58<H>  ----------------------------<  132<H>  3.7   |  27  |  8.28<H>    Ca    9.4      29 Oct 2020 11:25    TPro  5.3<L>  /  Alb  3.7  /  TBili  0.4  /  DBili  x   /  AST  19  /  ALT  14  /  AlkPhos  142<H>  10-29    PT/INR - ( 28 Oct 2020 18:45 )   PT: 10.8 SEC;   INR: 0.94          PTT - ( 28 Oct 2020 18:45 )  PTT:35.8 SEC    CAPILLARY BLOOD GLUCOSE                MEDICATIONS  (STANDING):  amLODIPine   Tablet 10 milliGRAM(s) Oral daily  epoetin efren-epbx (RETACRIT) Injectable 09140 Unit(s) IV Push <User Schedule>  heparin   Injectable 5000 Unit(s) SubCutaneous three times a day  hydrALAZINE 25 milliGRAM(s) Oral three times a day  labetalol 100 milliGRAM(s) Oral three times a day  losartan 100 milliGRAM(s) Oral daily  multivitamin 1 Tablet(s) Oral daily  pantoprazole    Tablet 40 milliGRAM(s) Oral before breakfast    MEDICATIONS  (PRN):  acetaminophen   Tablet .. 650 milliGRAM(s) Oral every 6 hours PRN Temp greater or equal to 38C (100.4F), Moderate Pain (4 - 6), Severe Pain (7 - 10)  oxyCODONE    IR 5 milliGRAM(s) Oral every 8 hours PRN Severe Pain (7 - 10)  sucralfate 1 Gram(s) Oral four times a day PRN dyspepsia          PHYSICAL EXAM:  GENERAL: NAD, well-groomed, well-developed  HEAD:  Atraumatic, Normocephalic  CHEST/LUNG: Clear to percussion bilaterally; No rales, rhonchi, wheezing, or rubs  HEART: Regular rate and rhythm; No murmurs, rubs, or gallops  ABDOMEN: Soft, Nontender, Nondistended; Bowel sounds present  EXTREMITIES:  2+ Peripheral Pulses, No clubbing, cyanosis, or edema  LYMPH: No lymphadenopathy noted  SKIN: No rashes or lesions    Care Discussed with Consultants/Other Providers [ ] YES  [ ] NO

## 2020-10-29 NOTE — PROGRESS NOTE ADULT - ASSESSMENT
79 y/o F with ESRD on HD (MWF), HTN, GERD presents with hip and back pain after a fall.      Problem/Plan - 1:  ·  Problem: Hip pain.  Plan: 2/2 Fall  CT reviewed  - no intervention as per orthi   - Tylenol PRN with oxycodone if pain uncontrolled  - PT.     Problem/Plan - 2:  ·  Problem: ESRD (end stage renal disease) on dialysis.  Plan: - nephrology consult reviewed  - HD tomorrow.     Problem/Plan - 3:  ·  Problem: Essential hypertension.  Plan: - Continue home BP meds.

## 2020-10-30 LAB
ALBUMIN SERPL ELPH-MCNC: 3.8 G/DL — SIGNIFICANT CHANGE UP (ref 3.3–5)
ALP SERPL-CCNC: 134 U/L — HIGH (ref 40–120)
ALT FLD-CCNC: 12 U/L — SIGNIFICANT CHANGE UP (ref 4–33)
ANION GAP SERPL CALC-SCNC: 12 MMO/L — SIGNIFICANT CHANGE UP (ref 7–14)
AST SERPL-CCNC: 18 U/L — SIGNIFICANT CHANGE UP (ref 4–32)
BILIRUB SERPL-MCNC: 0.6 MG/DL — SIGNIFICANT CHANGE UP (ref 0.2–1.2)
BUN SERPL-MCNC: 33 MG/DL — HIGH (ref 7–23)
CALCIUM SERPL-MCNC: 9.5 MG/DL — SIGNIFICANT CHANGE UP (ref 8.4–10.5)
CHLORIDE SERPL-SCNC: 97 MMOL/L — LOW (ref 98–107)
CO2 SERPL-SCNC: 27 MMOL/L — SIGNIFICANT CHANGE UP (ref 22–31)
CREAT SERPL-MCNC: 5.29 MG/DL — HIGH (ref 0.5–1.3)
GLUCOSE SERPL-MCNC: 83 MG/DL — SIGNIFICANT CHANGE UP (ref 70–99)
HCT VFR BLD CALC: 25.5 % — LOW (ref 34.5–45)
HGB BLD-MCNC: 8.1 G/DL — LOW (ref 11.5–15.5)
MCHC RBC-ENTMCNC: 31.8 % — LOW (ref 32–36)
MCHC RBC-ENTMCNC: 32.7 PG — SIGNIFICANT CHANGE UP (ref 27–34)
MCV RBC AUTO: 102.8 FL — HIGH (ref 80–100)
NRBC # FLD: 0 K/UL — SIGNIFICANT CHANGE UP (ref 0–0)
PLATELET # BLD AUTO: 99 K/UL — LOW (ref 150–400)
PMV BLD: 11.8 FL — SIGNIFICANT CHANGE UP (ref 7–13)
POTASSIUM SERPL-MCNC: 3.9 MMOL/L — SIGNIFICANT CHANGE UP (ref 3.5–5.3)
POTASSIUM SERPL-SCNC: 3.9 MMOL/L — SIGNIFICANT CHANGE UP (ref 3.5–5.3)
PROT SERPL-MCNC: 5.7 G/DL — LOW (ref 6–8.3)
RBC # BLD: 2.48 M/UL — LOW (ref 3.8–5.2)
RBC # FLD: 15.6 % — HIGH (ref 10.3–14.5)
SODIUM SERPL-SCNC: 136 MMOL/L — SIGNIFICANT CHANGE UP (ref 135–145)
WBC # BLD: 6.88 K/UL — SIGNIFICANT CHANGE UP (ref 3.8–10.5)
WBC # FLD AUTO: 6.88 K/UL — SIGNIFICANT CHANGE UP (ref 3.8–10.5)

## 2020-10-30 RX ORDER — SUCRALFATE 1 G
1 TABLET ORAL
Refills: 0 | Status: DISCONTINUED | OUTPATIENT
Start: 2020-10-30 | End: 2020-11-04

## 2020-10-30 RX ORDER — LIDOCAINE AND PRILOCAINE CREAM 25; 25 MG/G; MG/G
1 CREAM TOPICAL ONCE
Refills: 0 | Status: COMPLETED | OUTPATIENT
Start: 2020-10-31 | End: 2020-10-31

## 2020-10-30 RX ORDER — SUCRALFATE 1 G
1 TABLET ORAL
Refills: 0 | Status: DISCONTINUED | OUTPATIENT
Start: 2020-10-30 | End: 2020-10-30

## 2020-10-30 RX ORDER — LACTULOSE 10 G/15ML
10 SOLUTION ORAL ONCE
Refills: 0 | Status: COMPLETED | OUTPATIENT
Start: 2020-10-30 | End: 2020-10-30

## 2020-10-30 RX ADMIN — OXYCODONE HYDROCHLORIDE 5 MILLIGRAM(S): 5 TABLET ORAL at 03:21

## 2020-10-30 RX ADMIN — HEPARIN SODIUM 5000 UNIT(S): 5000 INJECTION INTRAVENOUS; SUBCUTANEOUS at 21:46

## 2020-10-30 RX ADMIN — Medication 25 MILLIGRAM(S): at 21:46

## 2020-10-30 RX ADMIN — AMLODIPINE BESYLATE 10 MILLIGRAM(S): 2.5 TABLET ORAL at 06:59

## 2020-10-30 RX ADMIN — Medication 1 GRAM(S): at 11:55

## 2020-10-30 RX ADMIN — Medication 25 MILLIGRAM(S): at 13:26

## 2020-10-30 RX ADMIN — Medication 1 GRAM(S): at 07:40

## 2020-10-30 RX ADMIN — LOSARTAN POTASSIUM 100 MILLIGRAM(S): 100 TABLET, FILM COATED ORAL at 06:59

## 2020-10-30 RX ADMIN — Medication 1 TABLET(S): at 13:27

## 2020-10-30 RX ADMIN — Medication 1 GRAM(S): at 23:35

## 2020-10-30 RX ADMIN — Medication 25 MILLIGRAM(S): at 06:59

## 2020-10-30 RX ADMIN — HEPARIN SODIUM 5000 UNIT(S): 5000 INJECTION INTRAVENOUS; SUBCUTANEOUS at 13:28

## 2020-10-30 RX ADMIN — OXYCODONE HYDROCHLORIDE 5 MILLIGRAM(S): 5 TABLET ORAL at 16:45

## 2020-10-30 RX ADMIN — PANTOPRAZOLE SODIUM 40 MILLIGRAM(S): 20 TABLET, DELAYED RELEASE ORAL at 06:59

## 2020-10-30 RX ADMIN — HEPARIN SODIUM 5000 UNIT(S): 5000 INJECTION INTRAVENOUS; SUBCUTANEOUS at 06:59

## 2020-10-30 RX ADMIN — Medication 100 MILLIGRAM(S): at 13:26

## 2020-10-30 RX ADMIN — LACTULOSE 10 GRAM(S): 10 SOLUTION ORAL at 16:05

## 2020-10-30 RX ADMIN — Medication 100 MILLIGRAM(S): at 06:59

## 2020-10-30 RX ADMIN — OXYCODONE HYDROCHLORIDE 5 MILLIGRAM(S): 5 TABLET ORAL at 16:12

## 2020-10-30 RX ADMIN — OXYCODONE HYDROCHLORIDE 5 MILLIGRAM(S): 5 TABLET ORAL at 04:05

## 2020-10-30 RX ADMIN — Medication 100 MILLIGRAM(S): at 21:46

## 2020-10-30 RX ADMIN — Medication 1 GRAM(S): at 18:23

## 2020-10-30 NOTE — PHYSICAL THERAPY INITIAL EVALUATION ADULT - PERTINENT HX OF CURRENT PROBLEM, REHAB EVAL
81 y/o F with ESRD on HD (MWF), HTN, GERD presents with hip and back pain after a fall.  Pt reports falling after she lost her balance trying to exit her elevator.  She fell landing on her R hip. CT right hip acute, minimally displaced fractures of the junction of the right suprapubic ramus/acetabulum and right inferior pubic ramus. Acute sacral fracture with mildly displaced fracture fragment anteriorly.  Per ortho note, no surgical intervention at this time.

## 2020-10-30 NOTE — PHYSICAL THERAPY INITIAL EVALUATION ADULT - ACTIVE RANGE OF MOTION EXAMINATION, REHAB EVAL
bilateral upper extremity Active ROM was WFL (within functional limits)/Left LE Active ROM was WFL (within functional limits)/right LE hip flexion 0-60 degrees; right knee extension -10 degrees from neutral

## 2020-10-30 NOTE — PHYSICAL THERAPY INITIAL EVALUATION ADULT - PLANNED THERAPY INTERVENTIONS, PT EVAL
strengthening/balance training/gait training/transfer training/Patient left supine in bed in NAD, call bell in reach, all lines intact. +Bed alarm. SRINIVASAN Kingsley at bedside./bed mobility training

## 2020-10-30 NOTE — PROGRESS NOTE ADULT - ASSESSMENT
79 y/o F with ESRD on HD (MWF), HTN, GERD presents with hip and back pain after a fall.      Problem/Plan - 1:  ·  Problem: Hip pain.  Plan: 2/2 Fall  CT reviewed  - no intervention as per orthi   - Tylenol PRN with oxycodone if pain uncontrolled  - PT.     Problem/Plan - 2:  ·  Problem: ESRD (end stage renal disease) on dialysis.  Plan: - nephrology consult reviewed  - HD tomorrow.     Problem/Plan - 3:  ·  Problem: Essential hypertension.  Plan: - Continue home BP meds.     dc planning

## 2020-10-30 NOTE — PROGRESS NOTE ADULT - SUBJECTIVE AND OBJECTIVE BOX
Stevie Zamorano MD  Interventional Cardiology / Endovascular Specialist  [place] Office : [address] [place] [address] [place] [address]  Tel:   [place] Office : [address] [facility] [place] [address]  Tel: [phone]  Cell : [phone]    Subjective/Overnight events: Patient lying in bed comfortably. Denies chest pain, SOB or palpitations  	  MEDICATIONS:  amLODIPine   Tablet 10 milliGRAM(s) Oral daily  heparin   Injectable 5000 Unit(s) SubCutaneous three times a day  hydrALAZINE 25 milliGRAM(s) Oral three times a day  labetalol 100 milliGRAM(s) Oral three times a day  losartan 100 milliGRAM(s) Oral daily        acetaminophen   Tablet .. 650 milliGRAM(s) Oral every 6 hours PRN  oxyCODONE    IR 5 milliGRAM(s) Oral every 8 hours PRN    lactulose Syrup 10 Gram(s) Oral once  pantoprazole    Tablet 40 milliGRAM(s) Oral before breakfast  sucralfate suspension 1 Gram(s) Oral four times a day      epoetin efren-epbx (RETACRIT) Injectable 27811 Unit(s) IV Push <User Schedule>  multivitamin 1 Tablet(s) Oral daily      PAST MEDICAL/SURGICAL HISTORY  PAST MEDICAL & SURGICAL HISTORY:  HTN (hypertension)    Dialysis patient    No significant past surgical history        SOCIAL HISTORY: Substance Use (street drugs): ( x ) never used  (  ) other:    FAMILY HISTORY:  No pertinent family history in first degree relatives        REVIEW OF SYSTEMS:  CONSTITUTIONAL: No fever, weight loss, or fatigue  EYES: No eye pain, visual disturbances, or discharge  ENMT:  No difficulty hearing, tinnitus, vertigo; No sinus or throat pain  BREASTS: No pain, masses, or nipple discharge  GASTROINTESTINAL: No abdominal or epigastric pain. No nausea, vomiting, or hematemesis; No diarrhea or constipation. No melena or hematochezia.  GENITOURINARY: No dysuria, frequency, hematuria, or incontinence  NEUROLOGICAL: No headaches, memory loss, loss of strength, numbness, or tremors  ENDOCRINE: No heat or cold intolerance; No hair loss  MUSCULOSKELETAL: No joint pain or swelling; No muscle, back, or extremity pain  PSYCHIATRIC: No depression, anxiety, mood swings, or difficulty sleeping  HEME/LYMPH: No easy bruising, or bleeding gums  All others negative    PHYSICAL EXAM:  T(C): 36.2 (10-30-20 @ 14:21), Max: 37.4 (10-29-20 @ 20:11)  HR: 80 (10-30-20 @ 14:21) (70 - 80)  BP: 150/67 (10-30-20 @ 14:21) (143/45 - 195/67)  RR: 18 (10-30-20 @ 14:21) (18 - 20)  SpO2: 99% (10-30-20 @ 14:21) (95% - 99%)  Wt(kg): --  I&O's Summary    29 Oct 2020 07:01  -  30 Oct 2020 07:00  --------------------------------------------------------  IN: 400 mL / OUT: 1400 mL / NET: -1000 mL          GENERAL: NAD  EYES: EOMI, PERRLA, conjunctiva and sclera clear  ENMT: No tonsillar erythema, exudates, or enlargement  Cardiovascular: Normal S1 S2, No JVD, Holosystolic murmur, No edema  Respiratory: crackles b/l  Gastrointestinal:  Soft, Non-tender, + BS	  Extremities: Normal range of motion, No clubbing, cyanosis or edema  NERVOUS SYSTEM:  Alert & Oriented X3                                    8.1    6.88  )-----------( 99       ( 30 Oct 2020 06:48 )             25.5     10-30    136  |  97<L>  |  33<H>  ----------------------------<  83  3.9   |  27  |  5.29<H>    Ca    9.5      30 Oct 2020 06:48    TPro  5.7<L>  /  Alb  3.8  /  TBili  0.6  /  DBili  x   /  AST  18  /  ALT  12  /  AlkPhos  134<H>  10-30    proBNP:   Lipid Profile:   HgA1c:   TSH:     Consultant(s) Notes Reviewed:  [x ] YES  [ ] NO    Care Discussed with Consultants/Other Providers [ x] YES  [ ] NO    Imaging Personally Reviewed independently:  [x] YES  [ ] NO    All labs, radiologic studies, vitals, orders and medications list reviewed. Patient is seen and examined at bedside. Case discussed with medical team.                 Stevie Zamorano MD  Interventional Cardiology / Endovascular Specialist  [place] Office : [address] [place] [address] [place] [address]  Tel:   [place] Office : [address] [facility] [place] [address]  Tel: [phone]  Cell : [phone]    Subjective/Overnight events: Patient lying in bed comfortably. Denies chest pain, SOB or palpitations  	  MEDICATIONS:  amLODIPine   Tablet 10 milliGRAM(s) Oral daily  heparin   Injectable 5000 Unit(s) SubCutaneous three times a day  hydrALAZINE 25 milliGRAM(s) Oral three times a day  labetalol 100 milliGRAM(s) Oral three times a day  losartan 100 milliGRAM(s) Oral daily        acetaminophen   Tablet .. 650 milliGRAM(s) Oral every 6 hours PRN  oxyCODONE    IR 5 milliGRAM(s) Oral every 8 hours PRN    lactulose Syrup 10 Gram(s) Oral once  pantoprazole    Tablet 40 milliGRAM(s) Oral before breakfast  sucralfate suspension 1 Gram(s) Oral four times a day      epoetin efren-epbx (RETACRIT) Injectable 06554 Unit(s) IV Push <User Schedule>  multivitamin 1 Tablet(s) Oral daily      PAST MEDICAL/SURGICAL HISTORY  PAST MEDICAL & SURGICAL HISTORY:  HTN (hypertension)    Dialysis patient    No significant past surgical history        SOCIAL HISTORY: Substance Use (street drugs): ( x ) never used  (  ) other:    FAMILY HISTORY:  No pertinent family history in first degree relatives        REVIEW OF SYSTEMS:  CONSTITUTIONAL: No fever, weight loss, or fatigue  EYES: No eye pain, visual disturbances, or discharge  ENMT:  No difficulty hearing, tinnitus, vertigo; No sinus or throat pain  BREASTS: No pain, masses, or nipple discharge  GASTROINTESTINAL: No abdominal or epigastric pain. No nausea, vomiting, or hematemesis; No diarrhea or constipation. No melena or hematochezia.  GENITOURINARY: No dysuria, frequency, hematuria, or incontinence  NEUROLOGICAL: No headaches, memory loss, loss of strength, numbness, or tremors  ENDOCRINE: No heat or cold intolerance; No hair loss  MUSCULOSKELETAL: No joint pain or swelling; No muscle, back, or extremity pain  PSYCHIATRIC: No depression, anxiety, mood swings, or difficulty sleeping  HEME/LYMPH: No easy bruising, or bleeding gums  All others negative    PHYSICAL EXAM:  T(C): 36.2 (10-30-20 @ 14:21), Max: 37.4 (10-29-20 @ 20:11)  HR: 80 (10-30-20 @ 14:21) (70 - 80)  BP: 150/67 (10-30-20 @ 14:21) (143/45 - 195/67)  RR: 18 (10-30-20 @ 14:21) (18 - 20)  SpO2: 99% (10-30-20 @ 14:21) (95% - 99%)  Wt(kg): --  I&O's Summary    29 Oct 2020 07:01  -  30 Oct 2020 07:00  --------------------------------------------------------  IN: 400 mL / OUT: 1400 mL / NET: -1000 mL          GENERAL: NAD  EYES: EOMI, PERRLA, conjunctiva and sclera clear  ENMT: No tonsillar erythema, exudates, or enlargement  Cardiovascular: Normal S1 S2, No JVD, Holosystolic murmur, No edema  Respiratory: crackles b/l  Gastrointestinal:  Soft, Non-tender, + BS	  Extremities: Normal range of motion, No clubbing, cyanosis or edema  NERVOUS SYSTEM:  Alert & Oriented X3                                    8.1    6.88  )-----------( 99       ( 30 Oct 2020 06:48 )             25.5     10-30    136  |  97<L>  |  33<H>  ----------------------------<  83  3.9   |  27  |  5.29<H>    Ca    9.5      30 Oct 2020 06:48    TPro  5.7<L>  /  Alb  3.8  /  TBili  0.6  /  DBili  x   /  AST  18  /  ALT  12  /  AlkPhos  134<H>  10-30    proBNP:   Lipid Profile:   HgA1c:   TSH:     Consultant(s) Notes Reviewed:  [x ] YES  [ ] NO    Care Discussed with Consultants/Other Providers [ x] YES  [ ] NO    Imaging Personally Reviewed independently:  [x] YES  [ ] NO    All labs, radiologic studies, vitals, orders and medications list reviewed. Patient is seen and examined at bedside. Case discussed with medical team.

## 2020-10-30 NOTE — PROGRESS NOTE ADULT - ASSESSMENT
81 yo female Estonian speaking but able to communicate with english w/ pmhx ESRD on dialysis (MWF) via [facility] from [facility], HTN present with mechanical fall    ESRD on HD via AVF MWF  from [facility]  HD consent in the chart  last HD 10/29  no indication for dialysis today. will planning for dialysis 10/31 and resume MWF schedule next weak    HTN  better  continue home BP medications--losartan, labetalol, amlodipine hydralazine    Anemia  s/p recent transfusion  transfuse to keep hb>8  epo with dialysis  monitor    fall/hip pain  work up per team  f/u ortho    constipation  likely sec to pain medication  lactulose 10x1  monitor

## 2020-10-30 NOTE — PROGRESS NOTE ADULT - SUBJECTIVE AND OBJECTIVE BOX
[facility]   MD BARBARA HENSON DO ANAM SIDDIQUI ANGELA WONG, PA    TEL:  OFFICE: [phone]  DR OSEGUERA CELL: [phone]  DR. MITCHELL CELL: [phone]  DR. MORENO CELL: [phone]  CHARU ROWLAND CELL: [phone]    From 5pm-7am Answering Service [phone]    -- RENAL FOLLOW UP NOTE ---Date of Service 10-30-20 @ 13:52    Patient is a 80y old  Female who presents with a chief complaint of Fall (29 Oct 2020 16:36)      Patient seen and examined at bedside. c/o constipation     VITALS:  T(F): 98.6 (10-30-20 @ 11:45), Max: 99.3 (10-29-20 @ 20:11)  HR: 70 (10-30-20 @ 11:45)  BP: 143/45 (10-30-20 @ 11:45)  RR: 20 (10-30-20 @ 11:45)  SpO2: 95% (10-30-20 @ 11:45)  Wt(kg): --    10-29 @ 07:01  -  10-30 @ 07:00  --------------------------------------------------------  IN: 400 mL / OUT: 1400 mL / NET: -1000 mL          PHYSICAL EXAM:  Constitutional: NAD  Neck: No JVD  Respiratory: CTAB, no wheezes, rales or rhonchi  Cardiovascular: S1, S2, RRR  Gastrointestinal: BS+, soft, NT/ND  Extremities: No peripheral edema    Hospital Medications:   MEDICATIONS  (STANDING):  amLODIPine   Tablet 10 milliGRAM(s) Oral daily  epoetin efren-epbx (RETACRIT) Injectable 11147 Unit(s) IV Push <User Schedule>  heparin   Injectable 5000 Unit(s) SubCutaneous three times a day  hydrALAZINE 25 milliGRAM(s) Oral three times a day  labetalol 100 milliGRAM(s) Oral three times a day  lactulose Syrup 10 Gram(s) Oral once  losartan 100 milliGRAM(s) Oral daily  multivitamin 1 Tablet(s) Oral daily  pantoprazole    Tablet 40 milliGRAM(s) Oral before breakfast  sucralfate suspension 1 Gram(s) Oral four times a day      LABS:  10-30    136  |  97<L>  |  33<H>  ----------------------------<  83  3.9   |  27  |  5.29<H>    Ca    9.5      30 Oct 2020 06:48    TPro  5.7<L>  /  Alb  3.8  /  TBili  0.6  /  DBili      /  AST  18  /  ALT  12  /  AlkPhos  134<H>  10-30    Creatinine Trend: 5.29 <--, 8.28 <--, 7.11 <--    Albumin, Serum: 3.8 g/dL (10-30 @ 06:48)                              8.1    6.88  )-----------( 99       ( 30 Oct 2020 06:48 )             25.5     Urine Studies:  Urinalysis - [10-11-20 @ 09:04]      Color LIGHT YELLOW / Appearance CLEAR / SG 1.010 / pH 8.0      Gluc 50 / Ketone NEGATIVE  / Bili NEGATIVE / Urobili NORMAL       Blood NEGATIVE / Protein 300 / Leuk Est NEGATIVE / Nitrite NEGATIVE      RBC 0-2 / WBC 0-2 / Hyaline NEGATIVE / Gran  / Sq Epi OCC / Non Sq Epi  / Bacteria NEGATIVE      Iron 152, TIBC 211, %sat --      [10-10-20 @ 10:35]  Ferritin 2109      [10-11-20 @ 06:34]  TSH 2.22      [10-10-20 @ 10:35]    HBsAg NEGATIVE      [10-10-20 @ 17:05]      RADIOLOGY & ADDITIONAL STUDIES:

## 2020-10-30 NOTE — PROGRESS NOTE ADULT - SUBJECTIVE AND OBJECTIVE BOX
Patient is a 80y old  Female who presents with a chief complaint of Fall (30 Oct 2020 14:29)      INTERVAL HPI/OVERNIGHT EVENTS:  T(C): 36.2 (10-30-20 @ 14:21), Max: 37.4 (10-29-20 @ 20:11)  HR: 80 (10-30-20 @ 14:21) (70 - 80)  BP: 150/67 (10-30-20 @ 14:21) (143/45 - 195/67)  RR: 18 (10-30-20 @ 14:21) (18 - 20)  SpO2: 99% (10-30-20 @ 14:21) (95% - 99%)  Wt(kg): --  I&O's Summary    29 Oct 2020 07:01  -  30 Oct 2020 07:00  --------------------------------------------------------  IN: 400 mL / OUT: 1400 mL / NET: -1000 mL        LABS:                        8.1    6.88  )-----------( 99       ( 30 Oct 2020 06:48 )             25.5     10-30    136  |  97<L>  |  33<H>  ----------------------------<  83  3.9   |  27  |  5.29<H>    Ca    9.5      30 Oct 2020 06:48    TPro  5.7<L>  /  Alb  3.8  /  TBili  0.6  /  DBili  x   /  AST  18  /  ALT  12  /  AlkPhos  134<H>  10-30    PT/INR - ( 28 Oct 2020 18:45 )   PT: 10.8 SEC;   INR: 0.94          PTT - ( 28 Oct 2020 18:45 )  PTT:35.8 SEC    CAPILLARY BLOOD GLUCOSE                MEDICATIONS  (STANDING):  amLODIPine   Tablet 10 milliGRAM(s) Oral daily  epoetin efren-epbx (RETACRIT) Injectable 87375 Unit(s) IV Push <User Schedule>  heparin   Injectable 5000 Unit(s) SubCutaneous three times a day  hydrALAZINE 25 milliGRAM(s) Oral three times a day  labetalol 100 milliGRAM(s) Oral three times a day  lactulose Syrup 10 Gram(s) Oral once  losartan 100 milliGRAM(s) Oral daily  multivitamin 1 Tablet(s) Oral daily  pantoprazole    Tablet 40 milliGRAM(s) Oral before breakfast  sucralfate suspension 1 Gram(s) Oral four times a day    MEDICATIONS  (PRN):  acetaminophen   Tablet .. 650 milliGRAM(s) Oral every 6 hours PRN Temp greater or equal to 38C (100.4F), Moderate Pain (4 - 6), Severe Pain (7 - 10)  oxyCODONE    IR 5 milliGRAM(s) Oral every 8 hours PRN Severe Pain (7 - 10)          PHYSICAL EXAM:  GENERAL: NAD, well-groomed, well-developed  HEAD:  Atraumatic, Normocephalic  CHEST/LUNG: Clear to percussion bilaterally; No rales, rhonchi, wheezing, or rubs  HEART: Regular rate and rhythm; No murmurs, rubs, or gallops  ABDOMEN: Soft, Nontender, Nondistended; Bowel sounds present  EXTREMITIES:  2no edema     Care Discussed with Consultants/Other Providers [x ] YES  [ ] NO

## 2020-10-30 NOTE — PROGRESS NOTE ADULT - ASSESSMENT
Assessment and Plan    79 y/o F with ESRD on HD (MWF), HTN, GERD presents with fall    1. s/p fall  -patient states she tripped coming off elevator  -denies dizziness, LOC, chest pain or SOB  -no syncopal event  -mechanical fall  -echo from recent admission shows mild MR and normal LV function    2. HTN  -BP controlled  -continue with norvasc, labetalol and losartan and hydralazine  -continue to monitor BP    3. ESRD  -on HD  -f/u renal

## 2020-10-31 LAB
ANION GAP SERPL CALC-SCNC: 12 MMO/L — SIGNIFICANT CHANGE UP (ref 7–14)
BUN SERPL-MCNC: 42 MG/DL — HIGH (ref 7–23)
CALCIUM SERPL-MCNC: 9.5 MG/DL — SIGNIFICANT CHANGE UP (ref 8.4–10.5)
CHLORIDE SERPL-SCNC: 97 MMOL/L — LOW (ref 98–107)
CO2 SERPL-SCNC: 26 MMOL/L — SIGNIFICANT CHANGE UP (ref 22–31)
CREAT SERPL-MCNC: 6.82 MG/DL — HIGH (ref 0.5–1.3)
GLUCOSE SERPL-MCNC: 99 MG/DL — SIGNIFICANT CHANGE UP (ref 70–99)
HCT VFR BLD CALC: 25.8 % — LOW (ref 34.5–45)
HGB BLD-MCNC: 8 G/DL — LOW (ref 11.5–15.5)
MAGNESIUM SERPL-MCNC: 2.5 MG/DL — SIGNIFICANT CHANGE UP (ref 1.6–2.6)
MCHC RBC-ENTMCNC: 31 % — LOW (ref 32–36)
MCHC RBC-ENTMCNC: 32.4 PG — SIGNIFICANT CHANGE UP (ref 27–34)
MCV RBC AUTO: 104.5 FL — HIGH (ref 80–100)
MRSA PCR RESULT.: SIGNIFICANT CHANGE UP
NRBC # FLD: 0 K/UL — SIGNIFICANT CHANGE UP (ref 0–0)
PHOSPHATE SERPL-MCNC: 3.6 MG/DL — SIGNIFICANT CHANGE UP (ref 2.5–4.5)
PLATELET # BLD AUTO: 84 K/UL — LOW (ref 150–400)
PMV BLD: 10.3 FL — SIGNIFICANT CHANGE UP (ref 7–13)
POTASSIUM SERPL-MCNC: 3.8 MMOL/L — SIGNIFICANT CHANGE UP (ref 3.5–5.3)
POTASSIUM SERPL-SCNC: 3.8 MMOL/L — SIGNIFICANT CHANGE UP (ref 3.5–5.3)
RBC # BLD: 2.47 M/UL — LOW (ref 3.8–5.2)
RBC # FLD: 15.5 % — HIGH (ref 10.3–14.5)
S AUREUS DNA NOSE QL NAA+PROBE: NOT DETECTED — SIGNIFICANT CHANGE UP
SODIUM SERPL-SCNC: 135 MMOL/L — SIGNIFICANT CHANGE UP (ref 135–145)
WBC # BLD: 5.29 K/UL — SIGNIFICANT CHANGE UP (ref 3.8–10.5)
WBC # FLD AUTO: 5.29 K/UL — SIGNIFICANT CHANGE UP (ref 3.8–10.5)

## 2020-10-31 RX ORDER — CHLORHEXIDINE GLUCONATE 213 G/1000ML
1 SOLUTION TOPICAL DAILY
Refills: 0 | Status: DISCONTINUED | OUTPATIENT
Start: 2020-10-31 | End: 2020-11-04

## 2020-10-31 RX ADMIN — Medication 1 GRAM(S): at 06:15

## 2020-10-31 RX ADMIN — AMLODIPINE BESYLATE 10 MILLIGRAM(S): 2.5 TABLET ORAL at 06:15

## 2020-10-31 RX ADMIN — OXYCODONE HYDROCHLORIDE 5 MILLIGRAM(S): 5 TABLET ORAL at 21:21

## 2020-10-31 RX ADMIN — Medication 100 MILLIGRAM(S): at 21:19

## 2020-10-31 RX ADMIN — PANTOPRAZOLE SODIUM 40 MILLIGRAM(S): 20 TABLET, DELAYED RELEASE ORAL at 06:15

## 2020-10-31 RX ADMIN — OXYCODONE HYDROCHLORIDE 5 MILLIGRAM(S): 5 TABLET ORAL at 08:47

## 2020-10-31 RX ADMIN — LOSARTAN POTASSIUM 100 MILLIGRAM(S): 100 TABLET, FILM COATED ORAL at 06:15

## 2020-10-31 RX ADMIN — Medication 1 TABLET(S): at 14:26

## 2020-10-31 RX ADMIN — LIDOCAINE AND PRILOCAINE CREAM 1 APPLICATION(S): 25; 25 CREAM TOPICAL at 08:48

## 2020-10-31 RX ADMIN — Medication 25 MILLIGRAM(S): at 14:25

## 2020-10-31 RX ADMIN — HEPARIN SODIUM 5000 UNIT(S): 5000 INJECTION INTRAVENOUS; SUBCUTANEOUS at 14:25

## 2020-10-31 RX ADMIN — Medication 100 MILLIGRAM(S): at 14:25

## 2020-10-31 RX ADMIN — CHLORHEXIDINE GLUCONATE 1 APPLICATION(S): 213 SOLUTION TOPICAL at 14:27

## 2020-10-31 RX ADMIN — HEPARIN SODIUM 5000 UNIT(S): 5000 INJECTION INTRAVENOUS; SUBCUTANEOUS at 06:15

## 2020-10-31 RX ADMIN — HEPARIN SODIUM 5000 UNIT(S): 5000 INJECTION INTRAVENOUS; SUBCUTANEOUS at 21:19

## 2020-10-31 RX ADMIN — OXYCODONE HYDROCHLORIDE 5 MILLIGRAM(S): 5 TABLET ORAL at 09:15

## 2020-10-31 RX ADMIN — Medication 1 GRAM(S): at 14:25

## 2020-10-31 RX ADMIN — OXYCODONE HYDROCHLORIDE 5 MILLIGRAM(S): 5 TABLET ORAL at 20:20

## 2020-10-31 RX ADMIN — Medication 25 MILLIGRAM(S): at 21:19

## 2020-10-31 RX ADMIN — Medication 1 GRAM(S): at 21:20

## 2020-10-31 NOTE — PROGRESS NOTE ADULT - SUBJECTIVE AND OBJECTIVE BOX
Patient is a 80y old  Female who presents with a chief complaint of Fall (31 Oct 2020 13:46)      INTERVAL HPI/OVERNIGHT EVENTS:  T(C): 36.8 (10-31-20 @ 14:18), Max: 36.8 (10-31-20 @ 14:18)  HR: 82 (10-31-20 @ 14:20) (72 - 82)  BP: 168/54 (10-31-20 @ 14:20) (160/50 - 168/54)  RR: 18 (10-31-20 @ 14:20) (17 - 19)  SpO2: 98% (10-31-20 @ 14:20) (97% - 98%)  Wt(kg): --  I&O's Summary    31 Oct 2020 08:01  -  31 Oct 2020 18:39  --------------------------------------------------------  IN: 400 mL / OUT: 1400 mL / NET: -1000 mL        LABS:                        8.0    5.29  )-----------( 84       ( 31 Oct 2020 05:35 )             25.8     10-31    135  |  97<L>  |  42<H>  ----------------------------<  99  3.8   |  26  |  6.82<H>    Ca    9.5      31 Oct 2020 05:35  Phos  3.6     10-31  Mg     2.5     10-31    TPro  5.7<L>  /  Alb  3.8  /  TBili  0.6  /  DBili  x   /  AST  18  /  ALT  12  /  AlkPhos  134<H>  10-30        CAPILLARY BLOOD GLUCOSE                MEDICATIONS  (STANDING):  amLODIPine   Tablet 10 milliGRAM(s) Oral daily  chlorhexidine 4% Liquid 1 Application(s) Topical daily  heparin   Injectable 5000 Unit(s) SubCutaneous three times a day  hydrALAZINE 25 milliGRAM(s) Oral three times a day  labetalol 100 milliGRAM(s) Oral three times a day  losartan 100 milliGRAM(s) Oral daily  multivitamin 1 Tablet(s) Oral daily  pantoprazole    Tablet 40 milliGRAM(s) Oral before breakfast  sucralfate suspension 1 Gram(s) Oral four times a day    MEDICATIONS  (PRN):  acetaminophen   Tablet .. 650 milliGRAM(s) Oral every 6 hours PRN Temp greater or equal to 38C (100.4F), Moderate Pain (4 - 6), Severe Pain (7 - 10)  oxyCODONE    IR 5 milliGRAM(s) Oral every 8 hours PRN Severe Pain (7 - 10)          PHYSICAL EXAM:  GENERAL: NAD, well-groomed, well-developed  HEAD:  Atraumatic, Normocephalic  CHEST/LUNG: Clear to percussion bilaterally; No rales, rhonchi, wheezing, or rubs  HEART: Regular rate and rhythm; No murmurs, rubs, or gallops  ABDOMEN: Soft, Nontender, Nondistended; Bowel sounds present  EXTREMITIES:  2+ Peripheral Pulses, No clubbing, cyanosis, or edema  LYMPH: No lymphadenopathy noted  SKIN: No rashes or lesions    Care Discussed with Consultants/Other Providers [ ] YES  [ ] NO

## 2020-10-31 NOTE — PROGRESS NOTE ADULT - SUBJECTIVE AND OBJECTIVE BOX
CAROLYN FRIEDMAN  80y  Patient is a 80y old  Female who presents with a chief complaint of Fall (30 Oct 2020 15:48)    HPI:  Seen and examined. No new complaints.    HEALTH ISSUES - PROBLEM Dx:  Essential hypertension  Essential hypertension    ESRD (end stage renal disease) on dialysis  ESRD (end stage renal disease) on dialysis    Hip pain  Hip pain          MEDICATIONS  (STANDING):  amLODIPine   Tablet 10 milliGRAM(s) Oral daily  chlorhexidine 4% Liquid 1 Application(s) Topical daily  epoetin efren-epbx (RETACRIT) Injectable 25988 Unit(s) IV Push <User Schedule>  heparin   Injectable 5000 Unit(s) SubCutaneous three times a day  hydrALAZINE 25 milliGRAM(s) Oral three times a day  labetalol 100 milliGRAM(s) Oral three times a day  losartan 100 milliGRAM(s) Oral daily  multivitamin 1 Tablet(s) Oral daily  pantoprazole    Tablet 40 milliGRAM(s) Oral before breakfast  sucralfate suspension 1 Gram(s) Oral four times a day    MEDICATIONS  (PRN):  acetaminophen   Tablet .. 650 milliGRAM(s) Oral every 6 hours PRN Temp greater or equal to 38C (100.4F), Moderate Pain (4 - 6), Severe Pain (7 - 10)  oxyCODONE    IR 5 milliGRAM(s) Oral every 8 hours PRN Severe Pain (7 - 10)    Vital Signs Last 24 Hrs  T(C): 36.7 (31 Oct 2020 10:30), Max: 36.7 (31 Oct 2020 06:00)  T(F): 98 (31 Oct 2020 10:30), Max: 98 (31 Oct 2020 06:00)  HR: 73 (31 Oct 2020 10:30) (73 - 80)  BP: 167/73 (31 Oct 2020 10:30) (150/67 - 167/73)  BP(mean): --  RR: 17 (31 Oct 2020 10:30) (17 - 19)  SpO2: 98% (31 Oct 2020 06:00) (97% - 99%)  Daily     Daily Weight in k (31 Oct 2020 06:00)    PHYSICAL EXAM:  Constitutional:  She appears comfortable and not distressed. Not diaphoretic.    Neck:  The thyroid is normal. Trachea is midline.     Respiratory: The lungs are clear to auscultation. No dullness and expansion is normal.    Cardiovascular: S1 and S2 are normal. No mummurs, rubs or gallops are present.    Gastrointestinal: The abdomen is soft. No tenderness is present. No masses are present. Bowel sounds are normal.    Genitourinary: The bladder is not distended. No CVA tenderness is present.    Extremities: No edema is noted. No deformities are present.    Neurological: Cognition is normal. Tone, power and sensation are normal.     Skin: No lesions are seen  or palpated.    Lymph Nodes: No lymphadenopathy is present.                              8.0    5.29  )-----------( 84       ( 31 Oct 2020 05:35 )             25.8     10-    135  |  97<L>  |  42<H>  ----------------------------<  99  3.8   |  26  |  6.82<H>    Ca    9.5      31 Oct 2020 05:35  Phos  3.6     10-  Mg     2.5     10-31    TPro  5.7<L>  /  Alb  3.8  /  TBili  0.6  /  DBili  x   /  AST  18  /  ALT  12  /  AlkPhos  134<H>  10-30

## 2020-10-31 NOTE — PROGRESS NOTE ADULT - SUBJECTIVE AND OBJECTIVE BOX
Stevie Zamorano MD  Interventional Cardiology / Endovascular Specialist  [place] Office : [address] [place] [address] [place] [address]  Tel:   [place] Office : [address] [facility] [place] [address]  Tel: [phone]  Cell : [phone]    Subjective/Overnight events: Patient lying in bed comfortably. Denies chest pain, SOB or palpitations  	  MEDICATIONS:  amLODIPine   Tablet 10 milliGRAM(s) Oral daily  heparin   Injectable 5000 Unit(s) SubCutaneous three times a day  hydrALAZINE 25 milliGRAM(s) Oral three times a day  labetalol 100 milliGRAM(s) Oral three times a day  losartan 100 milliGRAM(s) Oral daily        acetaminophen   Tablet .. 650 milliGRAM(s) Oral every 6 hours PRN  oxyCODONE    IR 5 milliGRAM(s) Oral every 8 hours PRN    pantoprazole    Tablet 40 milliGRAM(s) Oral before breakfast  sucralfate suspension 1 Gram(s) Oral four times a day      chlorhexidine 4% Liquid 1 Application(s) Topical daily  epoetin efren-epbx (RETACRIT) Injectable 53959 Unit(s) IV Push <User Schedule>  multivitamin 1 Tablet(s) Oral daily      PAST MEDICAL/SURGICAL HISTORY  PAST MEDICAL & SURGICAL HISTORY:  HTN (hypertension)    Dialysis patient    No significant past surgical history        SOCIAL HISTORY: Substance Use (street drugs): ( x ) never used  (  ) other:    FAMILY HISTORY:  No pertinent family history in first degree relatives        REVIEW OF SYSTEMS:  CONSTITUTIONAL: No fever, weight loss, or fatigue  EYES: No eye pain, visual disturbances, or discharge  ENMT:  No difficulty hearing, tinnitus, vertigo; No sinus or throat pain  BREASTS: No pain, masses, or nipple discharge  GASTROINTESTINAL: No abdominal or epigastric pain. No nausea, vomiting, or hematemesis; No diarrhea or constipation. No melena or hematochezia.  GENITOURINARY: No dysuria, frequency, hematuria, or incontinence  NEUROLOGICAL: No headaches, memory loss, loss of strength, numbness, or tremors  ENDOCRINE: No heat or cold intolerance; No hair loss  MUSCULOSKELETAL: No joint pain or swelling; No muscle, back, or extremity pain  PSYCHIATRIC: No depression, anxiety, mood swings, or difficulty sleeping  HEME/LYMPH: No easy bruising, or bleeding gums  All others negative    PHYSICAL EXAM:  T(C): 36.7 (10-31-20 @ 10:30), Max: 36.7 (10-31-20 @ 06:00)  HR: 73 (10-31-20 @ 10:30) (73 - 80)  BP: 167/73 (10-31-20 @ 10:30) (150/67 - 167/73)  RR: 17 (10-31-20 @ 10:30) (17 - 19)  SpO2: 98% (10-31-20 @ 06:00) (97% - 99%)  Wt(kg): --  I&O's Summary        GENERAL: NAD  EYES: EOMI, PERRLA, conjunctiva and sclera clear  ENMT: No tonsillar erythema, exudates, or enlargement  Cardiovascular: Normal S1 S2, No JVD, Holosystolic murmur, No edema  Respiratory: crackles b/l  Gastrointestinal:  Soft, Non-tender, + BS	  Extremities: Normal range of motion, No clubbing, cyanosis or edema  NERVOUS SYSTEM:  Alert & Oriented X3                                    8.0    5.29  )-----------( 84       ( 31 Oct 2020 05:35 )             25.8     10-31    135  |  97<L>  |  42<H>  ----------------------------<  99  3.8   |  26  |  6.82<H>    Ca    9.5      31 Oct 2020 05:35  Phos  3.6     10-31  Mg     2.5     10-31    TPro  5.7<L>  /  Alb  3.8  /  TBili  0.6  /  DBili  x   /  AST  18  /  ALT  12  /  AlkPhos  134<H>  10-30    proBNP:   Lipid Profile:   HgA1c:   TSH:     Consultant(s) Notes Reviewed:  [x ] YES  [ ] NO    Care Discussed with Consultants/Other Providers [ x] YES  [ ] NO    Imaging Personally Reviewed independently:  [x] YES  [ ] NO    All labs, radiologic studies, vitals, orders and medications list reviewed. Patient is seen and examined at bedside. Case discussed with medical team.

## 2020-10-31 NOTE — PROGRESS NOTE ADULT - ASSESSMENT
79 yo female Bulgarian speaking but able to communicate with english w/ pmhx ESRD on dialysis (MWF) via AVF from [place] HD unit, HTN present with mechanical fall    ESRD on HD via AVF MWF  From [place] HD unit  HD consent in the chart  HD today with 1.5 L UF, then on Monday.    HTN  Control is better.  - Continue home BP medications--losartan, labetalol, amlodipine hydralazine    Anemia  s/p recent transfusion  transfuse to keep hb>8  epo with dialysis  monitor    fall/hip pain  work up per team  f/u ortho    constipation  likely sec to pain medication  lactulose 10x1  monitor

## 2020-11-01 LAB
ANION GAP SERPL CALC-SCNC: 10 MMO/L — SIGNIFICANT CHANGE UP (ref 7–14)
BUN SERPL-MCNC: 31 MG/DL — HIGH (ref 7–23)
CALCIUM SERPL-MCNC: 9.6 MG/DL — SIGNIFICANT CHANGE UP (ref 8.4–10.5)
CHLORIDE SERPL-SCNC: 96 MMOL/L — LOW (ref 98–107)
CO2 SERPL-SCNC: 28 MMOL/L — SIGNIFICANT CHANGE UP (ref 22–31)
CREAT SERPL-MCNC: 5.24 MG/DL — HIGH (ref 0.5–1.3)
GLUCOSE SERPL-MCNC: 98 MG/DL — SIGNIFICANT CHANGE UP (ref 70–99)
HBV CORE AB SER-ACNC: NONREACTIVE — SIGNIFICANT CHANGE UP
HBV SURFACE AB SER-ACNC: 16 MLU/ML — SIGNIFICANT CHANGE UP
HCT VFR BLD CALC: 25.3 % — LOW (ref 34.5–45)
HCV AB S/CO SERPL IA: 0.05 S/CO — SIGNIFICANT CHANGE UP (ref 0–0.99)
HCV AB SERPL-IMP: SIGNIFICANT CHANGE UP
HGB BLD-MCNC: 7.8 G/DL — LOW (ref 11.5–15.5)
MAGNESIUM SERPL-MCNC: 2.2 MG/DL — SIGNIFICANT CHANGE UP (ref 1.6–2.6)
MCHC RBC-ENTMCNC: 30.8 % — LOW (ref 32–36)
MCHC RBC-ENTMCNC: 32.1 PG — SIGNIFICANT CHANGE UP (ref 27–34)
MCV RBC AUTO: 104.1 FL — HIGH (ref 80–100)
NRBC # FLD: 0 K/UL — SIGNIFICANT CHANGE UP (ref 0–0)
PHOSPHATE SERPL-MCNC: 3 MG/DL — SIGNIFICANT CHANGE UP (ref 2.5–4.5)
PLATELET # BLD AUTO: 94 K/UL — LOW (ref 150–400)
PMV BLD: 10.6 FL — SIGNIFICANT CHANGE UP (ref 7–13)
POTASSIUM SERPL-MCNC: 3.6 MMOL/L — SIGNIFICANT CHANGE UP (ref 3.5–5.3)
POTASSIUM SERPL-SCNC: 3.6 MMOL/L — SIGNIFICANT CHANGE UP (ref 3.5–5.3)
RBC # BLD: 2.43 M/UL — LOW (ref 3.8–5.2)
RBC # FLD: 15.5 % — HIGH (ref 10.3–14.5)
SODIUM SERPL-SCNC: 134 MMOL/L — LOW (ref 135–145)
WBC # BLD: 4.28 K/UL — SIGNIFICANT CHANGE UP (ref 3.8–10.5)
WBC # FLD AUTO: 4.28 K/UL — SIGNIFICANT CHANGE UP (ref 3.8–10.5)

## 2020-11-01 RX ADMIN — PANTOPRAZOLE SODIUM 40 MILLIGRAM(S): 20 TABLET, DELAYED RELEASE ORAL at 05:27

## 2020-11-01 RX ADMIN — Medication 25 MILLIGRAM(S): at 21:53

## 2020-11-01 RX ADMIN — HEPARIN SODIUM 5000 UNIT(S): 5000 INJECTION INTRAVENOUS; SUBCUTANEOUS at 21:53

## 2020-11-01 RX ADMIN — HEPARIN SODIUM 5000 UNIT(S): 5000 INJECTION INTRAVENOUS; SUBCUTANEOUS at 13:22

## 2020-11-01 RX ADMIN — OXYCODONE HYDROCHLORIDE 5 MILLIGRAM(S): 5 TABLET ORAL at 07:03

## 2020-11-01 RX ADMIN — Medication 650 MILLIGRAM(S): at 22:23

## 2020-11-01 RX ADMIN — OXYCODONE HYDROCHLORIDE 5 MILLIGRAM(S): 5 TABLET ORAL at 07:35

## 2020-11-01 RX ADMIN — Medication 1 TABLET(S): at 12:33

## 2020-11-01 RX ADMIN — Medication 100 MILLIGRAM(S): at 13:23

## 2020-11-01 RX ADMIN — LOSARTAN POTASSIUM 100 MILLIGRAM(S): 100 TABLET, FILM COATED ORAL at 05:27

## 2020-11-01 RX ADMIN — Medication 1 GRAM(S): at 12:32

## 2020-11-01 RX ADMIN — AMLODIPINE BESYLATE 10 MILLIGRAM(S): 2.5 TABLET ORAL at 05:27

## 2020-11-01 RX ADMIN — Medication 1 GRAM(S): at 23:18

## 2020-11-01 RX ADMIN — HEPARIN SODIUM 5000 UNIT(S): 5000 INJECTION INTRAVENOUS; SUBCUTANEOUS at 05:27

## 2020-11-01 RX ADMIN — Medication 650 MILLIGRAM(S): at 21:53

## 2020-11-01 RX ADMIN — Medication 1 GRAM(S): at 17:04

## 2020-11-01 RX ADMIN — Medication 100 MILLIGRAM(S): at 21:53

## 2020-11-01 RX ADMIN — Medication 100 MILLIGRAM(S): at 05:27

## 2020-11-01 RX ADMIN — Medication 25 MILLIGRAM(S): at 05:27

## 2020-11-01 RX ADMIN — Medication 25 MILLIGRAM(S): at 13:23

## 2020-11-01 RX ADMIN — CHLORHEXIDINE GLUCONATE 1 APPLICATION(S): 213 SOLUTION TOPICAL at 12:32

## 2020-11-01 RX ADMIN — Medication 1 GRAM(S): at 05:27

## 2020-11-01 NOTE — PROGRESS NOTE ADULT - SUBJECTIVE AND OBJECTIVE BOX
Stevie Zamorano MD  Interventional Cardiology / Endovascular Specialist  [place] Office : [address] [place] [address] [place]. [address]  Tel:   [place] Office : [address] [facility] [place] [address]  Tel: [phone]  Cell : [phone]      Subjective/Overnight events: Patient lying in bed comfortably. Denies chest pain, SOB or palpitations  	  MEDICATIONS:  amLODIPine   Tablet 10 milliGRAM(s) Oral daily  heparin   Injectable 5000 Unit(s) SubCutaneous three times a day  hydrALAZINE 25 milliGRAM(s) Oral three times a day  labetalol 100 milliGRAM(s) Oral three times a day  losartan 100 milliGRAM(s) Oral daily        acetaminophen   Tablet .. 650 milliGRAM(s) Oral every 6 hours PRN  oxyCODONE    IR 5 milliGRAM(s) Oral every 8 hours PRN    pantoprazole    Tablet 40 milliGRAM(s) Oral before breakfast  sucralfate suspension 1 Gram(s) Oral four times a day      chlorhexidine 4% Liquid 1 Application(s) Topical daily  multivitamin 1 Tablet(s) Oral daily      PAST MEDICAL/SURGICAL HISTORY  PAST MEDICAL & SURGICAL HISTORY:  HTN (hypertension)    Dialysis patient    No significant past surgical history        SOCIAL HISTORY: Substance Use (street drugs): ( x ) never used  (  ) other:    FAMILY HISTORY:  No pertinent family history in first degree relatives        REVIEW OF SYSTEMS:  CONSTITUTIONAL: No fever, weight loss, or fatigue  EYES: No eye pain, visual disturbances, or discharge  ENMT:  No difficulty hearing, tinnitus, vertigo; No sinus or throat pain  BREASTS: No pain, masses, or nipple discharge  GASTROINTESTINAL: No abdominal or epigastric pain. No nausea, vomiting, or hematemesis; No diarrhea or constipation. No melena or hematochezia.  GENITOURINARY: No dysuria, frequency, hematuria, or incontinence  NEUROLOGICAL: No headaches, memory loss, loss of strength, numbness, or tremors  ENDOCRINE: No heat or cold intolerance; No hair loss  MUSCULOSKELETAL: No joint pain or swelling; No muscle, back, or extremity pain  PSYCHIATRIC: No depression, anxiety, mood swings, or difficulty sleeping  HEME/LYMPH: No easy bruising, or bleeding gums  All others negative    PHYSICAL EXAM:  T(C): 36.8 (11-01-20 @ 12:29), Max: 36.8 (10-31-20 @ 14:18)  HR: 70 (11-01-20 @ 12:29) (68 - 82)  BP: 138/61 (11-01-20 @ 12:29) (138/61 - 171/58)  RR: 18 (11-01-20 @ 12:29) (16 - 18)  SpO2: 100% (11-01-20 @ 12:29) (97% - 100%)  Wt(kg): --  I&O's Summary    31 Oct 2020 08:01  -  01 Nov 2020 07:00  --------------------------------------------------------  IN: 400 mL / OUT: 1400 mL / NET: -1000 mL          GENERAL: NAD  EYES: EOMI, PERRLA, conjunctiva and sclera clear  ENMT: No tonsillar erythema, exudates, or enlargement  Cardiovascular: Normal S1 S2, No JVD, Holosystolic murmur, No edema  Respiratory: crackles b/l  Gastrointestinal:  Soft, Non-tender, + BS	  Extremities: Normal range of motion, No clubbing, cyanosis or edema  NERVOUS SYSTEM:  Alert & Oriented X3                                    7.8    4.28  )-----------( 94       ( 01 Nov 2020 06:13 )             25.3     11-01    134<L>  |  96<L>  |  31<H>  ----------------------------<  98  3.6   |  28  |  5.24<H>    Ca    9.6      01 Nov 2020 06:13  Phos  3.0     11-01  Mg     2.2     11-01      proBNP:   Lipid Profile:   HgA1c:   TSH:     Consultant(s) Notes Reviewed:  [x ] YES  [ ] NO    Care Discussed with Consultants/Other Providers [ x] YES  [ ] NO    Imaging Personally Reviewed independently:  [x] YES  [ ] NO    All labs, radiologic studies, vitals, orders and medications list reviewed. Patient is seen and examined at bedside. Case discussed with medical team.

## 2020-11-01 NOTE — PROGRESS NOTE ADULT - ASSESSMENT
81 y/o F with ESRD on HD (MWF), HTN, GERD presents with hip and back pain after a fall.      Problem/Plan - 1:  ·  Problem: Hip pain.  Plan: 2/2 Fall  CT reviewed  - no intervention as per orthi   - Tylenol PRN with oxycodone if pain uncontrolled  - PT.     Problem/Plan - 2:  ·  Problem: ESRD (end stage renal disease) on dialysis.  Plan: - nephrology consult reviewed  - HD tomorrow.     Problem/Plan - 3:  ·  Problem: Essential hypertension.  Plan: - Continue home BP meds.     dc planning to rehab

## 2020-11-01 NOTE — PROGRESS NOTE ADULT - SUBJECTIVE AND OBJECTIVE BOX
Patient is a 80y old  Female who presents with a chief complaint of Fall (01 Nov 2020 12:45)      INTERVAL HPI/OVERNIGHT EVENTS:  T(C): 36.8 (11-01-20 @ 12:29), Max: 36.8 (10-31-20 @ 14:18)  HR: 70 (11-01-20 @ 12:29) (68 - 82)  BP: 138/61 (11-01-20 @ 12:29) (138/61 - 171/58)  RR: 18 (11-01-20 @ 12:29) (16 - 18)  SpO2: 100% (11-01-20 @ 12:29) (97% - 100%)  Wt(kg): --  I&O's Summary    31 Oct 2020 08:01  -  01 Nov 2020 07:00  --------------------------------------------------------  IN: 400 mL / OUT: 1400 mL / NET: -1000 mL        LABS:                        7.8    4.28  )-----------( 94       ( 01 Nov 2020 06:13 )             25.3     11-01    134<L>  |  96<L>  |  31<H>  ----------------------------<  98  3.6   |  28  |  5.24<H>    Ca    9.6      01 Nov 2020 06:13  Phos  3.0     11-01  Mg     2.2     11-01          CAPILLARY BLOOD GLUCOSE                MEDICATIONS  (STANDING):  amLODIPine   Tablet 10 milliGRAM(s) Oral daily  chlorhexidine 4% Liquid 1 Application(s) Topical daily  heparin   Injectable 5000 Unit(s) SubCutaneous three times a day  hydrALAZINE 25 milliGRAM(s) Oral three times a day  labetalol 100 milliGRAM(s) Oral three times a day  losartan 100 milliGRAM(s) Oral daily  multivitamin 1 Tablet(s) Oral daily  pantoprazole    Tablet 40 milliGRAM(s) Oral before breakfast  sucralfate suspension 1 Gram(s) Oral four times a day    MEDICATIONS  (PRN):  acetaminophen   Tablet .. 650 milliGRAM(s) Oral every 6 hours PRN Temp greater or equal to 38C (100.4F), Moderate Pain (4 - 6), Severe Pain (7 - 10)  oxyCODONE    IR 5 milliGRAM(s) Oral every 8 hours PRN Severe Pain (7 - 10)          PHYSICAL EXAM:  GENERAL: NAD, well-groomed, well-developed  HEAD:  Atraumatic, Normocephalic  CHEST/LUNG: Clear to percussion bilaterally; No rales, rhonchi, wheezing, or rubs  HEART: Regular rate and rhythm; No murmurs, rubs, or gallops  ABDOMEN: Soft, Nontender, Nondistended; Bowel sounds present  EXTREMITIES:  2+ Peripheral Pulses, No clubbing, cyanosis, or edema  LYMPH: No lymphadenopathy noted  SKIN: No rashes or lesions    Care Discussed with Consultants/Other Providers [ ] YES  [ ] NO

## 2020-11-01 NOTE — PROGRESS NOTE ADULT - SUBJECTIVE AND OBJECTIVE BOX
CAROLYN FRIEDMAN  80y  Patient is a 80y old  Female who presents with a chief complaint of Fall (01 Nov 2020 14:13)    HPI:  ESRD, s/p fall with non-displaced pelvic ramus fracture.   HD yesterday.    HEALTH ISSUES - PROBLEM Dx:  Essential hypertension  Essential hypertension    ESRD (end stage renal disease) on dialysis  ESRD (end stage renal disease) on dialysis    Hip pain  Hip pain          MEDICATIONS  (STANDING):  amLODIPine   Tablet 10 milliGRAM(s) Oral daily  chlorhexidine 4% Liquid 1 Application(s) Topical daily  heparin   Injectable 5000 Unit(s) SubCutaneous three times a day  hydrALAZINE 25 milliGRAM(s) Oral three times a day  labetalol 100 milliGRAM(s) Oral three times a day  losartan 100 milliGRAM(s) Oral daily  multivitamin 1 Tablet(s) Oral daily  pantoprazole    Tablet 40 milliGRAM(s) Oral before breakfast  sucralfate suspension 1 Gram(s) Oral four times a day    MEDICATIONS  (PRN):  acetaminophen   Tablet .. 650 milliGRAM(s) Oral every 6 hours PRN Temp greater or equal to 38C (100.4F), Moderate Pain (4 - 6), Severe Pain (7 - 10)  oxyCODONE    IR 5 milliGRAM(s) Oral every 8 hours PRN Severe Pain (7 - 10)    Vital Signs Last 24 Hrs  T(C): 36.8 (01 Nov 2020 12:29), Max: 36.8 (01 Nov 2020 12:29)  T(F): 98.2 (01 Nov 2020 12:29), Max: 98.2 (01 Nov 2020 12:29)  HR: 70 (01 Nov 2020 12:29) (68 - 77)  BP: 138/61 (01 Nov 2020 12:29) (138/61 - 171/58)  BP(mean): --  RR: 18 (01 Nov 2020 12:29) (16 - 18)  SpO2: 100% (01 Nov 2020 12:29) (97% - 100%)  Daily     Daily     PHYSICAL EXAM:  Constitutional:  She appears comfortable and not distressed. Not diaphoretic.    Neck:  The thyroid is normal. Trachea is midline.     Breasts: Normal examination.    Respiratory: The lungs are clear to auscultation. No dullness and expansion is normal.    Cardiovascular: S1 and S2 are normal. No mummurs, rubs or gallops are present.    Gastrointestinal: The abdomen is soft. No tenderness is present. No masses are present. Bowel sounds are normal.    Genitourinary: The bladder is not distended. No CVA tenderness is present.    Extremities: No edema is noted. No deformities are present.    Neurological: Cognition is normal. Tone, power and sensation are normal.     Skin: No lesions are seen  or palpated.                                7.8    4.28  )-----------( 94       ( 01 Nov 2020 06:13 )             25.3     11-01    134<L>  |  96<L>  |  31<H>  ----------------------------<  98  3.6   |  28  |  5.24<H>    Ca    9.6      01 Nov 2020 06:13  Phos  3.0     11-01  Mg     2.2     11-01

## 2020-11-01 NOTE — PROGRESS NOTE ADULT - ASSESSMENT
79 yo female Sami speaking but able to communicate with english w/ pmhx ESRD on dialysis (MWF) via AVF from [place] [facility] unit, HTN present with mechanical fall    ESRD on HD via AVF MWF  From [place] HD unit  HD consent in the chart  HD yesterday with 1.5 L UF, then on Monday.    HTN  Control is better.  - Continue home BP medications--losartan, labetalol, amlodipine hydralazine    Anemia  s/p recent transfusion. She refused Epogen as she stated that it give her bad hypertension and has been using long acting ESAs.  - Will ask Pharmacy if Long acting ESAs are available for next HD  monitor    HIP PAIN.  Non-displaced Fracture.

## 2020-11-01 NOTE — PROGRESS NOTE ADULT - ASSESSMENT
Assessment and Plan    81 y/o F with ESRD on HD (MWF), HTN, GERD presents with fall    1. s/p fall  -patient states she tripped coming off elevator  -denies dizziness, LOC, chest pain or SOB  -no syncopal event  -mechanical fall  -echo from recent admission shows mild MR and normal LV function    2. HTN  -BP controlled  -continue with norvasc, labetalol and losartan and hydralazine  -continue to monitor BP    3. ESRD  -on HD  -f/u renal

## 2020-11-02 ENCOUNTER — TRANSCRIPTION ENCOUNTER (OUTPATIENT)
Age: 80
End: 2020-11-02

## 2020-11-02 LAB
ANION GAP SERPL CALC-SCNC: 12 MMO/L — SIGNIFICANT CHANGE UP (ref 7–14)
BUN SERPL-MCNC: 49 MG/DL — HIGH (ref 7–23)
CALCIUM SERPL-MCNC: 9.7 MG/DL — SIGNIFICANT CHANGE UP (ref 8.4–10.5)
CHLORIDE SERPL-SCNC: 95 MMOL/L — LOW (ref 98–107)
CO2 SERPL-SCNC: 27 MMOL/L — SIGNIFICANT CHANGE UP (ref 22–31)
CREAT SERPL-MCNC: 7.03 MG/DL — HIGH (ref 0.5–1.3)
GLUCOSE SERPL-MCNC: 108 MG/DL — HIGH (ref 70–99)
HCT VFR BLD CALC: 25.4 % — LOW (ref 34.5–45)
HGB BLD-MCNC: 8.1 G/DL — LOW (ref 11.5–15.5)
MAGNESIUM SERPL-MCNC: 2.3 MG/DL — SIGNIFICANT CHANGE UP (ref 1.6–2.6)
MCHC RBC-ENTMCNC: 31.9 % — LOW (ref 32–36)
MCHC RBC-ENTMCNC: 32.1 PG — SIGNIFICANT CHANGE UP (ref 27–34)
MCV RBC AUTO: 100.8 FL — HIGH (ref 80–100)
NRBC # FLD: 0 K/UL — SIGNIFICANT CHANGE UP (ref 0–0)
PHOSPHATE SERPL-MCNC: 3.3 MG/DL — SIGNIFICANT CHANGE UP (ref 2.5–4.5)
PLATELET # BLD AUTO: 117 K/UL — LOW (ref 150–400)
PMV BLD: 9.6 FL — SIGNIFICANT CHANGE UP (ref 7–13)
POTASSIUM SERPL-MCNC: 3.9 MMOL/L — SIGNIFICANT CHANGE UP (ref 3.5–5.3)
POTASSIUM SERPL-SCNC: 3.9 MMOL/L — SIGNIFICANT CHANGE UP (ref 3.5–5.3)
RBC # BLD: 2.52 M/UL — LOW (ref 3.8–5.2)
RBC # FLD: 15.5 % — HIGH (ref 10.3–14.5)
SARS-COV-2 RNA SPEC QL NAA+PROBE: SIGNIFICANT CHANGE UP
SODIUM SERPL-SCNC: 134 MMOL/L — LOW (ref 135–145)
WBC # BLD: 3.98 K/UL — SIGNIFICANT CHANGE UP (ref 3.8–10.5)
WBC # FLD AUTO: 3.98 K/UL — SIGNIFICANT CHANGE UP (ref 3.8–10.5)

## 2020-11-02 RX ORDER — LIDOCAINE AND PRILOCAINE CREAM 25; 25 MG/G; MG/G
1 CREAM TOPICAL
Refills: 0 | Status: DISCONTINUED | OUTPATIENT
Start: 2020-11-02 | End: 2020-11-04

## 2020-11-02 RX ORDER — ERYTHROPOIETIN 10000 [IU]/ML
10000 INJECTION, SOLUTION INTRAVENOUS; SUBCUTANEOUS
Refills: 0 | Status: DISCONTINUED | OUTPATIENT
Start: 2020-11-02 | End: 2020-11-04

## 2020-11-02 RX ORDER — ACETAMINOPHEN 500 MG
2 TABLET ORAL
Qty: 0 | Refills: 0 | DISCHARGE
Start: 2020-11-02

## 2020-11-02 RX ADMIN — Medication 100 MILLIGRAM(S): at 15:18

## 2020-11-02 RX ADMIN — Medication 650 MILLIGRAM(S): at 20:07

## 2020-11-02 RX ADMIN — CHLORHEXIDINE GLUCONATE 1 APPLICATION(S): 213 SOLUTION TOPICAL at 15:18

## 2020-11-02 RX ADMIN — Medication 650 MILLIGRAM(S): at 19:37

## 2020-11-02 RX ADMIN — Medication 25 MILLIGRAM(S): at 21:18

## 2020-11-02 RX ADMIN — HEPARIN SODIUM 5000 UNIT(S): 5000 INJECTION INTRAVENOUS; SUBCUTANEOUS at 15:18

## 2020-11-02 RX ADMIN — Medication 25 MILLIGRAM(S): at 15:18

## 2020-11-02 RX ADMIN — Medication 650 MILLIGRAM(S): at 13:15

## 2020-11-02 RX ADMIN — Medication 1 GRAM(S): at 23:08

## 2020-11-02 RX ADMIN — PANTOPRAZOLE SODIUM 40 MILLIGRAM(S): 20 TABLET, DELAYED RELEASE ORAL at 05:52

## 2020-11-02 RX ADMIN — Medication 650 MILLIGRAM(S): at 05:53

## 2020-11-02 RX ADMIN — HEPARIN SODIUM 5000 UNIT(S): 5000 INJECTION INTRAVENOUS; SUBCUTANEOUS at 21:18

## 2020-11-02 RX ADMIN — Medication 650 MILLIGRAM(S): at 06:23

## 2020-11-02 RX ADMIN — Medication 100 MILLIGRAM(S): at 21:18

## 2020-11-02 RX ADMIN — Medication 650 MILLIGRAM(S): at 12:22

## 2020-11-02 RX ADMIN — Medication 100 MILLIGRAM(S): at 05:53

## 2020-11-02 RX ADMIN — Medication 1 TABLET(S): at 15:18

## 2020-11-02 RX ADMIN — ERYTHROPOIETIN 10000 UNIT(S): 10000 INJECTION, SOLUTION INTRAVENOUS; SUBCUTANEOUS at 14:15

## 2020-11-02 RX ADMIN — AMLODIPINE BESYLATE 10 MILLIGRAM(S): 2.5 TABLET ORAL at 05:53

## 2020-11-02 RX ADMIN — HEPARIN SODIUM 5000 UNIT(S): 5000 INJECTION INTRAVENOUS; SUBCUTANEOUS at 05:53

## 2020-11-02 RX ADMIN — Medication 25 MILLIGRAM(S): at 05:52

## 2020-11-02 RX ADMIN — Medication 1 GRAM(S): at 05:52

## 2020-11-02 RX ADMIN — LOSARTAN POTASSIUM 100 MILLIGRAM(S): 100 TABLET, FILM COATED ORAL at 05:53

## 2020-11-02 RX ADMIN — Medication 1 GRAM(S): at 17:15

## 2020-11-02 RX ADMIN — LIDOCAINE AND PRILOCAINE CREAM 1 APPLICATION(S): 25; 25 CREAM TOPICAL at 11:00

## 2020-11-02 NOTE — DISCHARGE NOTE PROVIDER - NSDCCPCAREPLAN_GEN_ALL_CORE_FT
PRINCIPAL DISCHARGE DIAGNOSIS  Diagnosis: Inability to ambulate due to right hip  Assessment and Plan of Treatment:       SECONDARY DISCHARGE DIAGNOSES  Diagnosis: Essential hypertension  Assessment and Plan of Treatment: echo from recent admission shows mild MR and normal LV function  -continue with norvasc, labetalol and losartan and hydralazine     PRINCIPAL DISCHARGE DIAGNOSIS  Diagnosis: Inability to ambulate due to right hip  Assessment and Plan of Treatment: - CT showed- minimally displaced fractures of the pelvis/ acute sacral fracture   Continnue Tylenol PRN with Oxycodone if pain uncontrolled  Use bowel regimen to prevent constipation      SECONDARY DISCHARGE DIAGNOSES  Diagnosis: ESRD (end stage renal disease) on dialysis  Assessment and Plan of Treatment: continue HD M/W/F as per routine schedule    Diagnosis: Essential hypertension  Assessment and Plan of Treatment: echo from recent admission shows mild MR and normal LV function  -continue with norvasc, labetalol and losartan and hydralazine     PRINCIPAL DISCHARGE DIAGNOSIS  Diagnosis: Inability to ambulate due to right hip  Assessment and Plan of Treatment: You came to the hospiral for fall and sustained a pelvis and sacral injury. The CT scan shpwed you have a minimally displaced fractures of the pelvis and sacral. You do not need surgery. To help manage your pain, take Tylenol with Oxycodone.  Take this with stool softenders to prevent constipation.      SECONDARY DISCHARGE DIAGNOSES  Diagnosis: ESRD (end stage renal disease) on dialysis  Assessment and Plan of Treatment: Continue HD M/W/F as per routine schedule    Diagnosis: Essential hypertension  Assessment and Plan of Treatment: You have elevated blood pressure. Continue with norvasc, labetalol and losartan and hydralazine. Low sodium and fat diet, and follow up with primary care physician.       PRINCIPAL DISCHARGE DIAGNOSIS  Diagnosis: Inability to ambulate due to right hip  Assessment and Plan of Treatment: You came to the hospiral for fall and sustained a pelvis and sacral injury. The CT scan shpwed you have a minimally displaced fractures of the pelvis and sacral. You do not need surgery. To help manage your pain, take Tylenol with Oxycodone.  Take this with stool softenders to prevent constipation. You may follow up with Dr Colon outpatient when discharged from hospital. call office to make an appointment. [phone].        SECONDARY DISCHARGE DIAGNOSES  Diagnosis: ESRD (end stage renal disease) on dialysis  Assessment and Plan of Treatment: Continue HD M/W/F as per routine schedule    Diagnosis: Essential hypertension  Assessment and Plan of Treatment: You have elevated blood pressure. Continue with norvasc, labetalol, losartan and hydralazine. Low sodium and fat diet, and follow up with primary care physician.

## 2020-11-02 NOTE — DISCHARGE NOTE PROVIDER - CARE PROVIDERS DIRECT ADDRESSES
[email or web address]

## 2020-11-02 NOTE — DISCHARGE NOTE PROVIDER - NSDCMRMEDTOKEN_GEN_ALL_CORE_FT
amLODIPine 10 mg oral tablet: 1 tab(s) orally once a day  Auryxia: 1  orally  hydrALAZINE 25 mg oral tablet: 1 tab(s) orally 3 times a day  labetalol 100 mg oral tablet: 1 tab(s) orally 3 times a day  NexIUM 40 mg oral delayed release capsule: 1 cap(s) orally once a day  olmesartan 40 mg oral tablet: 1 tab(s) orally once a day  Shayna-Krystyna oral tablet: 1 tab(s) orally once a day  sucralfate 1 g oral tablet: 1 tab(s) orally 4 times a day (before meals and at bedtime), As Needed   acetaminophen 325 mg oral tablet: 2 tab(s) orally every 6 hours, As needed, Temp greater or equal to 38C (100.4F), Moderate Pain (4 - 6), Severe Pain (7 - 10)  amLODIPine 10 mg oral tablet: 1 tab(s) orally once a day  Auryxia: 1  orally  hydrALAZINE 25 mg oral tablet: 1 tab(s) orally 3 times a day  labetalol 100 mg oral tablet: 1 tab(s) orally 3 times a day  Multiple Vitamins oral tablet: 1 tab(s) orally once a day  NexIUM 40 mg oral delayed release capsule: 1 cap(s) orally once a day  olmesartan 40 mg oral tablet: 1 tab(s) orally once a day  Shayna-Krystyna oral tablet: 1 tab(s) orally once a day  sucralfate 1 g oral tablet: 1 tab(s) orally 4 times a day (before meals and at bedtime), As Needed   acetaminophen 325 mg oral tablet: 2 tab(s) orally every 6 hours, As needed, Temp greater or equal to 38C (100.4F), Moderate Pain (4 - 6), Severe Pain (7 - 10)  amLODIPine 10 mg oral tablet: 1 tab(s) orally once a day  hydrALAZINE 25 mg oral tablet: 1 tab(s) orally 3 times a day  hydrocortisone 25 mg rectal suppository: 1 suppository(ies) rectal once a day (at bedtime), As Needed  labetalol 100 mg oral tablet: 1 tab(s) orally 3 times a day  lactulose 10 g/15 mL oral syrup: 22.5 milliliter(s) orally once a day, As needed, constipation  Multiple Vitamins oral tablet: 1 tab(s) orally once a day  NexIUM 40 mg oral delayed release capsule: 1 cap(s) orally once a day  olmesartan 40 mg oral tablet: 1 tab(s) orally once a day  polyethylene glycol 3350 oral powder for reconstitution: 17 gram(s) orally 2 times a day  Shayna-Krystyna oral tablet: 1 tab(s) orally once a day  senna oral tablet: 2 tab(s) orally once a day (at bedtime)  sucralfate 1 g oral tablet: 1 tab(s) orally 4 times a day (before meals and at bedtime), As Needed   acetaminophen 325 mg oral tablet: 2 tab(s) orally every 6 hours, As needed, Temp greater or equal to 38C (100.4F), Moderate Pain (4 - 6), Severe Pain (7 - 10)  amLODIPine 10 mg oral tablet: 1 tab(s) orally once a day  hydrALAZINE 25 mg oral tablet: 1 tab(s) orally 3 times a day  hydrocortisone 25 mg rectal suppository: 1 suppository(ies) rectal once a day (at bedtime)  labetalol 100 mg oral tablet: 1 tab(s) orally 3 times a day  lactulose 10 g/15 mL oral syrup: 22.5 milliliter(s) orally once a day, As needed, constipation  Multiple Vitamins oral tablet: 1 tab(s) orally once a day  NexIUM 40 mg oral delayed release capsule: 1 cap(s) orally once a day  olmesartan 40 mg oral tablet: 1 tab(s) orally once a day  polyethylene glycol 3350 oral powder for reconstitution: 17 gram(s) orally 2 times a day  Shayna-Krystyna oral tablet: 1 tab(s) orally once a day  senna oral tablet: 2 tab(s) orally once a day (at bedtime)  sucralfate 1 g oral tablet: 1 tab(s) orally 4 times a day (before meals and at bedtime), As Needed

## 2020-11-02 NOTE — DISCHARGE NOTE PROVIDER - NSDCACTIVITY_GEN_ALL_CORE
Walking - Indoors allowed/Showering allowed Showering allowed/No heavy lifting/straining/Walking - Indoors allowed

## 2020-11-02 NOTE — PROGRESS NOTE ADULT - SUBJECTIVE AND OBJECTIVE BOX
Patient is a 80y old  Female who presents with a chief complaint of Fall (02 Nov 2020 16:44)      INTERVAL HPI/OVERNIGHT EVENTS:  T(C): 36.5 (11-02-20 @ 15:16), Max: 37.1 (11-01-20 @ 21:50)  HR: 76 (11-02-20 @ 15:16) (73 - 76)  BP: 153/50 (11-02-20 @ 15:16) (152/50 - 157/63)  RR: 18 (11-02-20 @ 15:16) (17 - 18)  SpO2: 99% (11-02-20 @ 15:16) (97% - 99%)  Wt(kg): --  I&O's Summary    02 Nov 2020 07:01  -  02 Nov 2020 17:07  --------------------------------------------------------  IN: 400 mL / OUT: 1400 mL / NET: -1000 mL        LABS:                        8.1    3.98  )-----------( 117      ( 02 Nov 2020 06:33 )             25.4     11-02    134<L>  |  95<L>  |  49<H>  ----------------------------<  108<H>  3.9   |  27  |  7.03<H>    Ca    9.7      02 Nov 2020 06:33  Phos  3.3     11-02  Mg     2.3     11-02          CAPILLARY BLOOD GLUCOSE                MEDICATIONS  (STANDING):  amLODIPine   Tablet 10 milliGRAM(s) Oral daily  chlorhexidine 4% Liquid 1 Application(s) Topical daily  epoetin efren-epbx (RETACRIT) Injectable 30232 Unit(s) IV Push <User Schedule>  heparin   Injectable 5000 Unit(s) SubCutaneous three times a day  hydrALAZINE 25 milliGRAM(s) Oral three times a day  labetalol 100 milliGRAM(s) Oral three times a day  lidocaine/prilocaine Cream 1 Application(s) Topical <User Schedule>  losartan 100 milliGRAM(s) Oral daily  multivitamin 1 Tablet(s) Oral daily  pantoprazole    Tablet 40 milliGRAM(s) Oral before breakfast  sucralfate suspension 1 Gram(s) Oral four times a day    MEDICATIONS  (PRN):  acetaminophen   Tablet .. 650 milliGRAM(s) Oral every 6 hours PRN Temp greater or equal to 38C (100.4F), Moderate Pain (4 - 6), Severe Pain (7 - 10)  oxyCODONE    IR 5 milliGRAM(s) Oral every 8 hours PRN Severe Pain (7 - 10)          PHYSICAL EXAM:  GENERAL: NAD, well-groomed, well-developed  HEAD:  Atraumatic, Normocephalic  CHEST/LUNG: Clear to percussion bilaterally; No rales, rhonchi, wheezing, or rubs  HEART: Regular rate and rhythm; No murmurs, rubs, or gallops  ABDOMEN: Soft, Nontender, Nondistended; Bowel sounds present  EXTREMITIES:  2+ Peripheral Pulses, No clubbing, cyanosis, or edema  LYMPH: No lymphadenopathy noted  SKIN: No rashes or lesions    Care Discussed with Consultants/Other Providers [ ] YES  [ ] NO

## 2020-11-02 NOTE — DISCHARGE NOTE PROVIDER - CARE PROVIDER_API CALL
Marky Wilson  INTERNAL MEDICINE  [address] [place] [address]  Phone: [phone]  Fax: [phone]  Follow Up Time:    Marky Wilson  INTERNAL MEDICINE  [address] [place] [address]  Phone: [phone]  Fax: [phone]  Follow Up Time:     Michele Duong)  Gastroenterology; Internal Medicine  [address] [place] [address]  Phone: [phone]  Fax: [phone]  Established Patient  Follow Up Time:     Stevie Zamorano  CARDIOVASCULAR DISEASE  [address] [place] [address]  Phone: [phone]  Fax: [phone]  Established Patient  Follow Up Time:    Marky Wilson  INTERNAL MEDICINE  [address], [place] [address]  Phone: [phone]  Fax: [phone]  Follow Up Time:     Michele Duong)  Gastroenterology; Internal Medicine  [address] [place] [address]  Phone: [phone]  Fax: [phone]  Established Patient  Follow Up Time:     Stevie Zamorano  CARDIOVASCULAR DISEASE  [address] [place] [address]  Phone: [phone]  Fax: [phone]  Established Patient  Follow Up Time:     Kristian Colon  ORTHOPAEDIC SURGERY  [address] [facility], [address]  [place] [address]  Phone: [phone]  Fax: [phone]  Follow Up Time:

## 2020-11-02 NOTE — PROGRESS NOTE ADULT - ASSESSMENT
81 yo female Armenian speaking but able to communicate with english w/ pmhx ESRD on dialysis (MWF) via AVF from [facility], HTN present with mechanical fall    ESRD on HD via AVF MWF  From [facility]  HD consent in the chart  HD 10/31 with 1.5 L UF,   HD today. Seen in HD today. Continue HD as ordered. tolerating well. vitals stable. access working well     HTN  Control is better.  - Continue home BP medications--losartan, labetalol, amlodipine hydralazine    Anemia  stable  epo ordered.   monitor    HIP PAIN.  Non-displaced Fracture.

## 2020-11-02 NOTE — DISCHARGE NOTE PROVIDER - PROVIDER TOKENS
PROVIDER:[TOKEN:[[id number]]] PROVIDER:[TOKEN:[[id number]]],PROVIDER:[TOKEN:[[id number]],ESTABLISHEDPATIENT:[T]],PROVIDER:[TOKEN:[[id number]],ESTABLISHEDPATIENT:[T]] PROVIDER:[TOKEN:[[id number]]],PROVIDER:[TOKEN:[[id number]],ESTABLISHEDPATIENT:[T]],PROVIDER:[TOKEN:[[id number]],ESTABLISHEDPATIENT:[T]],PROVIDER:[TOKEN:[[id number]]]

## 2020-11-02 NOTE — PROGRESS NOTE ADULT - SUBJECTIVE AND OBJECTIVE BOX
[facility]   MD BARBARA HENSON DO ANAM SIDDIQUI ANGELA WONG, PA    TEL:  OFFICE: [phone]  DR OSEGUERA CELL: [phone]  DR. MITCHELL CELL: [phone]  DR. MORENO CELL: [phone]  CHARU ROWLAND CELL: [phone]    From 5pm-7am Answering Service [phone]    -- RENAL FOLLOW UP NOTE ---Date of Service 11-02-20 @ 14:39    Patient is a 80y old  Female who presents with a chief complaint of Fall (02 Nov 2020 07:03)      Patient seen and examined in HD. No chest pain/sob    VITALS:  T(F): 97.7 (11-02-20 @ 05:49), Max: 98.8 (11-01-20 @ 21:50)  HR: 75 (11-02-20 @ 05:49)  BP: 152/50 (11-02-20 @ 05:49)  RR: 18 (11-02-20 @ 05:49)  SpO2: 97% (11-02-20 @ 05:49)  Wt(kg): --  flow 350        PHYSICAL EXAM:  Constitutional: NAD  Neck: No JVD  Respiratory: CTAB, no wheezes, rales or rhonchi  Cardiovascular: S1, S2, RRR  Gastrointestinal: BS+, soft, NT/ND  Extremities: No peripheral edema    Hospital Medications:   MEDICATIONS  (STANDING):  amLODIPine   Tablet 10 milliGRAM(s) Oral daily  chlorhexidine 4% Liquid 1 Application(s) Topical daily  epoetin efren-epbx (RETACRIT) Injectable 48643 Unit(s) IV Push <User Schedule>  heparin   Injectable 5000 Unit(s) SubCutaneous three times a day  hydrALAZINE 25 milliGRAM(s) Oral three times a day  labetalol 100 milliGRAM(s) Oral three times a day  lidocaine/prilocaine Cream 1 Application(s) Topical <User Schedule>  losartan 100 milliGRAM(s) Oral daily  multivitamin 1 Tablet(s) Oral daily  pantoprazole    Tablet 40 milliGRAM(s) Oral before breakfast  sucralfate suspension 1 Gram(s) Oral four times a day      LABS:  11-02    134<L>  |  95<L>  |  49<H>  ----------------------------<  108<H>  3.9   |  27  |  7.03<H>    Ca    9.7      02 Nov 2020 06:33  Phos  3.3     11-02  Mg     2.3     11-02      Creatinine Trend: 7.03 <--, 5.24 <--, 6.82 <--, 5.29 <--, 8.28 <--, 7.11 <--    Phosphorus Level, Serum: 3.3 mg/dL (11-02 @ 06:33)                              8.1    3.98  )-----------( 117      ( 02 Nov 2020 06:33 )             25.4     Urine Studies:  Urinalysis - [10-11-20 @ 09:04]      Color LIGHT YELLOW / Appearance CLEAR / SG 1.010 / pH 8.0      Gluc 50 / Ketone NEGATIVE  / Bili NEGATIVE / Urobili NORMAL       Blood NEGATIVE / Protein 300 / Leuk Est NEGATIVE / Nitrite NEGATIVE      RBC 0-2 / WBC 0-2 / Hyaline NEGATIVE / Gran  / Sq Epi OCC / Non Sq Epi  / Bacteria NEGATIVE      Iron 152, TIBC 211, %sat --      [10-10-20 @ 10:35]  Ferritin 2109      [10-11-20 @ 06:34]  TSH 2.22      [10-10-20 @ 10:35]    HBsAb 16.0      [10-31-20 @ 10:30]  HBsAg NEGATIVE      [10-10-20 @ 17:05]  HBcAb Nonreactive      [10-31-20 @ 10:30]  HCV 0.05, Nonreactive Hepatitis C AB  S/CO Ratio                        Interpretation  < 1.00                                   Non-Reactive  1.00 - 4.99                         Weakly-Reactive  >= 5.00                                Reactive  Non-Reactive: Aperson with a non-reactive HCV antibody  result is considered uninfected.  No further action is  needed unless recent infection is suspected.  In these  cases, consider repeat testing later to detect  seroconversion..  Weakly-Reactive: HCV antibody test is abnormal, HCV RNA  Qualitative test will follow.  Reactive: HCV antibody test is abnormal, HCV RNA  Qualitative test will follow.  Note: HCV antibody testing is performed on the [employer] system.      [10-31-20 @ 10:30]      RADIOLOGY & ADDITIONAL STUDIES:

## 2020-11-02 NOTE — PROGRESS NOTE ADULT - SUBJECTIVE AND OBJECTIVE BOX
Stevie Zamorano MD  Interventional Cardiology / Endovascular Specialist  [place] Office : [address] [place] [address] [place] [address]  Tel:   [place] Office : [address] [facility] [place] [address]  Tel: [phone]  Cell : [phone]        Subjective/Overnight events: Patient lying in bed comfortably. Denies chest pain, SOB or palpitations  	  	  MEDICATIONS:  amLODIPine   Tablet 10 milliGRAM(s) Oral daily  heparin   Injectable 5000 Unit(s) SubCutaneous three times a day  hydrALAZINE 25 milliGRAM(s) Oral three times a day  labetalol 100 milliGRAM(s) Oral three times a day  losartan 100 milliGRAM(s) Oral daily        acetaminophen   Tablet .. 650 milliGRAM(s) Oral every 6 hours PRN  oxyCODONE    IR 5 milliGRAM(s) Oral every 8 hours PRN    pantoprazole    Tablet 40 milliGRAM(s) Oral before breakfast  sucralfate suspension 1 Gram(s) Oral four times a day      chlorhexidine 4% Liquid 1 Application(s) Topical daily  epoetin efren-epbx (RETACRIT) Injectable 04810 Unit(s) IV Push <User Schedule>  lidocaine/prilocaine Cream 1 Application(s) Topical <User Schedule>  multivitamin 1 Tablet(s) Oral daily      PAST MEDICAL/SURGICAL HISTORY  PAST MEDICAL & SURGICAL HISTORY:  HTN (hypertension)    Dialysis patient    No significant past surgical history        SOCIAL HISTORY: Substance Use (street drugs): ( x ) never used  (  ) other:    FAMILY HISTORY:  No pertinent family history in first degree relatives        REVIEW OF SYSTEMS:  CONSTITUTIONAL: No fever, weight loss, or fatigue  EYES: No eye pain, visual disturbances, or discharge  ENMT:  No difficulty hearing, tinnitus, vertigo; No sinus or throat pain  BREASTS: No pain, masses, or nipple discharge  GASTROINTESTINAL: No abdominal or epigastric pain. No nausea, vomiting, or hematemesis; No diarrhea or constipation. No melena or hematochezia.  GENITOURINARY: No dysuria, frequency, hematuria, or incontinence  NEUROLOGICAL: No headaches, memory loss, loss of strength, numbness, or tremors  ENDOCRINE: No heat or cold intolerance; No hair loss  MUSCULOSKELETAL: No joint pain or swelling; No muscle, back, or extremity pain  PSYCHIATRIC: No depression, anxiety, mood swings, or difficulty sleeping  HEME/LYMPH: No easy bruising, or bleeding gums  All others negative    PHYSICAL EXAM:  T(C): 36.5 (11-02-20 @ 15:16), Max: 37.1 (11-01-20 @ 21:50)  HR: 76 (11-02-20 @ 15:16) (73 - 76)  BP: 153/50 (11-02-20 @ 15:16) (152/50 - 157/63)  RR: 18 (11-02-20 @ 15:16) (17 - 18)  SpO2: 99% (11-02-20 @ 15:16) (97% - 99%)  Wt(kg): --  I&O's Summary    02 Nov 2020 07:01  -  02 Nov 2020 16:44  --------------------------------------------------------  IN: 400 mL / OUT: 1400 mL / NET: -1000 mL          GENERAL: NAD  EYES: EOMI, PERRLA, conjunctiva and sclera clear  ENMT: No tonsillar erythema, exudates, or enlargement  Cardiovascular: Normal S1 S2, No JVD, Holosystolic murmur, No edema  Respiratory: crackles b/l  Gastrointestinal:  Soft, Non-tender, + BS	  Extremities: Normal range of motion, No clubbing, cyanosis or edema  NERVOUS SYSTEM:  Alert & Oriented X3                                  8.1    3.98  )-----------( 117      ( 02 Nov 2020 06:33 )             25.4     11-02    134<L>  |  95<L>  |  49<H>  ----------------------------<  108<H>  3.9   |  27  |  7.03<H>    Ca    9.7      02 Nov 2020 06:33  Phos  3.3     11-02  Mg     2.3     11-02      proBNP:   Lipid Profile:   HgA1c:   TSH:     Consultant(s) Notes Reviewed:  [x ] YES  [ ] NO    Care Discussed with Consultants/Other Providers [ x] YES  [ ] NO    Imaging Personally Reviewed independently:  [x] YES  [ ] NO    All labs, radiologic studies, vitals, orders and medications list reviewed. Patient is seen and examined at bedside. Case discussed with medical team.

## 2020-11-02 NOTE — DISCHARGE NOTE PROVIDER - HOSPITAL COURSE
80 F with ESRD on HD (MWF), HTN, GERD presents with hip and back pain after a fall.    Pelvis/sacral fracture   - No fractures seen on X-ray  - CT performed, minimally displaced fractures of the pelvis/ acute sacral fracture   - Tylenol PRN with Oxycodone if pain uncontrolled    ESRD on dialysis- on HD M/W/F    Pt is optimized for DC 80 F with ESRD on HD (MWF), HTN, GERD presents with hip and back pain after a fall.      s/p fall 2/2  fall  - no syncopal event  - monitored on telemetry, no events  - TTE from recent admission- mild MR and normal LV  - cardiology consulted- stable, fall 2/2 mechanical, not likely 2/2 cardiac etiology    Pelvis/sacral fracture   - No fractures seen on X-ray  - CT performed, minimally displaced fractures of the pelvis/ acute sacral fracture   - Tylenol PRN with Oxycodone if pain uncontrolled  - PT consulted _____    ESRD on dialysis- on HD M/W/F  - HD continued per schedule, access patent  - renal consulted    HTN  - home BP medications continued; norvasc, labetalol, losartan and hydralazine    Discussed with  _______   , cleared for DC on  _____ . Medications reviewed with patient. Medications sent to patient's preferred pharmacy. 80 F with ESRD on HD (MWF), HTN, GERD presents with hip and back pain after a fall.      s/p fall 2/2  fall  - no syncopal event  - monitored on telemetry, no events  - TTE from recent admission- mild MR and normal LV  - cardiology consulted- stable, fall 2/2 mechanical, not likely 2/2 cardiac etiology  - PT evaluation done, recommended restorative rehab to improve functional mobility.    Pelvis/sacral fracture   - No fractures seen on X-ray  - CT performed, minimally displaced fractures of the pelvis/ acute sacral fracture   - Tylenol PRN with Oxycodone if pain uncontrolled  - PT consulted p    ESRD on dialysis- on HD M/W/F  - HD continued per schedule, access patent  - renal consulted    HTN  - home BP medications continued; norvasc, labetalol, losartan and hydralazine    Discussed with  _______   , cleared for DC on  _____ . Medications reviewed with patient. Medications sent to patient's preferred pharmacy. 80 F with ESRD on HD (MWF), HTN, GERD presents with hip and back pain after a fall.      s/p fall 2/2  fall  - no syncopal event  - monitored on telemetry, no events  - TTE from recent admission- mild MR and normal LV  - cardiology consulted- stable, fall 2/2 mechanical, not likely 2/2 cardiac etiology  - PT evaluation done, recommended restorative rehab to improve functional mobility.    Pelvis/sacral fracture   - No fractures seen on X-ray  - CT performed, minimally displaced fractures of the pelvis/ acute sacral fracture   - Tylenol PRN with Oxycodone if pain uncontrolled  - PT consulted p    ESRD on dialysis- on HD M/W/F  - HD continued per schedule, access patent  - renal consulted    HTN  - home BP medications continued; norvasc, labetalol, losartan and hydralazine    Discussed with Dr. Hoyt, cleared for DC on  11/4/20. Medications reviewed with patient. Medications sent to patient's preferred pharmacy. 80 F with ESRD on HD (MWF), HTN, GERD presents with hip and back pain after a fall.      s/p fall 2/2  fall  - no syncopal event  - monitored on telemetry, no events  - TTE from recent admission- mild MR and normal LV  - cardiology consulted- stable, fall 2/2 mechanical, not likely 2/2 cardiac etiology  - PT evaluation done, recommended restorative rehab to improve functional mobility.    Pelvis/sacral fracture   - No fractures seen on X-ray  - CT performed, minimally displaced fractures of the pelvis/ acute sacral fracture   - Tylenol PRN with Oxycodone if pain uncontrolled  - PT consulted- rec rehab    ESRD on dialysis- on HD M/W/F  - HD continued per schedule, access patent  - renal consulted    HTN  - home BP medications continued; norvasc, labetalol, losartan and hydralazine    Discussed with Dr. Hoyt, cleared for DC on  11/4/20. Medications reviewed with patient.

## 2020-11-03 DIAGNOSIS — K59.01 SLOW TRANSIT CONSTIPATION: ICD-10-CM

## 2020-11-03 DIAGNOSIS — K62.5 HEMORRHAGE OF ANUS AND RECTUM: ICD-10-CM

## 2020-11-03 DIAGNOSIS — Z71.89 OTHER SPECIFIED COUNSELING: ICD-10-CM

## 2020-11-03 LAB
BLD GP AB SCN SERPL QL: NEGATIVE — SIGNIFICANT CHANGE UP
HCT VFR BLD CALC: 24.8 % — LOW (ref 34.5–45)
HGB BLD-MCNC: 7.7 G/DL — LOW (ref 11.5–15.5)
MCHC RBC-ENTMCNC: 31 % — LOW (ref 32–36)
MCHC RBC-ENTMCNC: 32 PG — SIGNIFICANT CHANGE UP (ref 27–34)
MCV RBC AUTO: 102.9 FL — HIGH (ref 80–100)
NRBC # FLD: 0 K/UL — SIGNIFICANT CHANGE UP (ref 0–0)
PLATELET # BLD AUTO: 132 K/UL — LOW (ref 150–400)
PMV BLD: 9.7 FL — SIGNIFICANT CHANGE UP (ref 7–13)
RBC # BLD: 2.41 M/UL — LOW (ref 3.8–5.2)
RBC # FLD: 15.8 % — HIGH (ref 10.3–14.5)
RH IG SCN BLD-IMP: POSITIVE — SIGNIFICANT CHANGE UP
WBC # BLD: 4.42 K/UL — SIGNIFICANT CHANGE UP (ref 3.8–10.5)
WBC # FLD AUTO: 4.42 K/UL — SIGNIFICANT CHANGE UP (ref 3.8–10.5)

## 2020-11-03 PROCEDURE — 93010 ELECTROCARDIOGRAM REPORT: CPT

## 2020-11-03 RX ORDER — HYDROCORTISONE 1 %
1 OINTMENT (GRAM) TOPICAL AT BEDTIME
Refills: 0 | Status: DISCONTINUED | OUTPATIENT
Start: 2020-11-03 | End: 2020-11-04

## 2020-11-03 RX ORDER — POLYETHYLENE GLYCOL 3350 17 G/17G
17 POWDER, FOR SOLUTION ORAL
Refills: 0 | Status: DISCONTINUED | OUTPATIENT
Start: 2020-11-03 | End: 2020-11-04

## 2020-11-03 RX ORDER — LACTULOSE 10 G/15ML
15 SOLUTION ORAL DAILY
Refills: 0 | Status: DISCONTINUED | OUTPATIENT
Start: 2020-11-03 | End: 2020-11-04

## 2020-11-03 RX ORDER — SENNA PLUS 8.6 MG/1
2 TABLET ORAL AT BEDTIME
Refills: 0 | Status: DISCONTINUED | OUTPATIENT
Start: 2020-11-03 | End: 2020-11-04

## 2020-11-03 RX ADMIN — Medication 25 MILLIGRAM(S): at 05:18

## 2020-11-03 RX ADMIN — Medication 1 GRAM(S): at 17:12

## 2020-11-03 RX ADMIN — Medication 100 MILLIGRAM(S): at 05:18

## 2020-11-03 RX ADMIN — Medication 25 MILLIGRAM(S): at 21:44

## 2020-11-03 RX ADMIN — Medication 650 MILLIGRAM(S): at 21:44

## 2020-11-03 RX ADMIN — LOSARTAN POTASSIUM 100 MILLIGRAM(S): 100 TABLET, FILM COATED ORAL at 05:18

## 2020-11-03 RX ADMIN — HEPARIN SODIUM 5000 UNIT(S): 5000 INJECTION INTRAVENOUS; SUBCUTANEOUS at 05:20

## 2020-11-03 RX ADMIN — Medication 100 MILLIGRAM(S): at 12:10

## 2020-11-03 RX ADMIN — Medication 1 GRAM(S): at 12:11

## 2020-11-03 RX ADMIN — PANTOPRAZOLE SODIUM 40 MILLIGRAM(S): 20 TABLET, DELAYED RELEASE ORAL at 05:18

## 2020-11-03 RX ADMIN — Medication 650 MILLIGRAM(S): at 05:19

## 2020-11-03 RX ADMIN — Medication 25 MILLIGRAM(S): at 12:10

## 2020-11-03 RX ADMIN — Medication 100 MILLIGRAM(S): at 21:44

## 2020-11-03 RX ADMIN — Medication 1 GRAM(S): at 23:03

## 2020-11-03 RX ADMIN — SENNA PLUS 2 TABLET(S): 8.6 TABLET ORAL at 21:44

## 2020-11-03 RX ADMIN — Medication 650 MILLIGRAM(S): at 22:44

## 2020-11-03 RX ADMIN — Medication 650 MILLIGRAM(S): at 05:49

## 2020-11-03 RX ADMIN — AMLODIPINE BESYLATE 10 MILLIGRAM(S): 2.5 TABLET ORAL at 05:18

## 2020-11-03 RX ADMIN — Medication 650 MILLIGRAM(S): at 12:09

## 2020-11-03 RX ADMIN — HEPARIN SODIUM 5000 UNIT(S): 5000 INJECTION INTRAVENOUS; SUBCUTANEOUS at 21:44

## 2020-11-03 RX ADMIN — Medication 1 GRAM(S): at 05:19

## 2020-11-03 RX ADMIN — Medication 650 MILLIGRAM(S): at 13:09

## 2020-11-03 RX ADMIN — CHLORHEXIDINE GLUCONATE 1 APPLICATION(S): 213 SOLUTION TOPICAL at 12:10

## 2020-11-03 RX ADMIN — Medication 1 TABLET(S): at 12:10

## 2020-11-03 RX ADMIN — HEPARIN SODIUM 5000 UNIT(S): 5000 INJECTION INTRAVENOUS; SUBCUTANEOUS at 12:10

## 2020-11-03 NOTE — CONSULT NOTE ADULT - PROBLEM SELECTOR RECOMMENDATION 9
-h/h stable   -2/2 irritation of internal hemorrhoid w/straining to have a bm   -anusol supp qhs   -started on bowel regimen   -high fiber diet

## 2020-11-03 NOTE — PROGRESS NOTE ADULT - SUBJECTIVE AND OBJECTIVE BOX
Patient is a 80y old  Female who presents with a chief complaint of Fall (03 Nov 2020 12:52)      INTERVAL HPI/OVERNIGHT EVENTS:  T(C): 37 (11-03-20 @ 12:07), Max: 37 (11-03-20 @ 12:07)  HR: 73 (11-03-20 @ 12:07) (73 - 76)  BP: 147/41 (11-03-20 @ 12:07) (147/41 - 165/50)  RR: 16 (11-03-20 @ 12:07) (16 - 18)  SpO2: 99% (11-03-20 @ 12:07) (95% - 99%)  Wt(kg): --  I&O's Summary    02 Nov 2020 07:01  -  03 Nov 2020 07:00  --------------------------------------------------------  IN: 400 mL / OUT: 1400 mL / NET: -1000 mL        LABS:                        8.1    3.98  )-----------( 117      ( 02 Nov 2020 06:33 )             25.4     11-02    134<L>  |  95<L>  |  49<H>  ----------------------------<  108<H>  3.9   |  27  |  7.03<H>    Ca    9.7      02 Nov 2020 06:33  Phos  3.3     11-02  Mg     2.3     11-02          CAPILLARY BLOOD GLUCOSE                MEDICATIONS  (STANDING):  amLODIPine   Tablet 10 milliGRAM(s) Oral daily  chlorhexidine 4% Liquid 1 Application(s) Topical daily  epoetin efren-epbx (RETACRIT) Injectable 34153 Unit(s) IV Push <User Schedule>  heparin   Injectable 5000 Unit(s) SubCutaneous three times a day  hydrALAZINE 25 milliGRAM(s) Oral three times a day  hydrocortisone hemorrhoidal Suppository 1 Suppository(s) Rectal at bedtime  labetalol 100 milliGRAM(s) Oral three times a day  lidocaine/prilocaine Cream 1 Application(s) Topical <User Schedule>  losartan 100 milliGRAM(s) Oral daily  multivitamin 1 Tablet(s) Oral daily  pantoprazole    Tablet 40 milliGRAM(s) Oral before breakfast  polyethylene glycol 3350 17 Gram(s) Oral two times a day  senna 2 Tablet(s) Oral at bedtime  sucralfate suspension 1 Gram(s) Oral four times a day    MEDICATIONS  (PRN):  acetaminophen   Tablet .. 650 milliGRAM(s) Oral every 6 hours PRN Temp greater or equal to 38C (100.4F), Moderate Pain (4 - 6), Severe Pain (7 - 10)  lactulose Syrup 15 Gram(s) Oral daily PRN constipation  oxyCODONE    IR 5 milliGRAM(s) Oral every 8 hours PRN Severe Pain (7 - 10)          PHYSICAL EXAM:  GENERAL: NAD, well-groomed, well-developed  HEAD:  Atraumatic, Normocephalic  CHEST/LUNG: Clear to percussion bilaterally; No rales, rhonchi, wheezing, or rubs  HEART: Regular rate and rhythm; No murmurs, rubs, or gallops  ABDOMEN: Soft, Nontender, Nondistended; Bowel sounds present  EXTREMITIES:  2+ Peripheral Pulses, No clubbing, cyanosis, or edema  LYMPH: No lymphadenopathy noted  SKIN: No rashes or lesions    Care Discussed with Consultants/Other Providers [x ] YES  [ ] NO

## 2020-11-03 NOTE — PROGRESS NOTE ADULT - ASSESSMENT
81 y/o F with ESRD on HD (MWF), HTN, GERD presents with hip and back pain after a fall.      Hip pain.  Plan: 2/2 Fall  CT reviewed  - no intervention as per orthi   - Tylenol PRN with oxycodone if pain uncontrolled  - PT.      ESRD (end stage renal disease) on dialysis.  Plan: - nephrology consult reviewed  - cw as HD      Essential hypertension.  Plan: - Continue home BP meds.     Anemia Plan rectal bleed  likely acute blood loss anemia  transfuse 1 unit  GI fu

## 2020-11-03 NOTE — PROGRESS NOTE ADULT - SUBJECTIVE AND OBJECTIVE BOX
Stevie Zamorano MD  Interventional Cardiology / Endovascular Specialist  [place] Office : [address] [place] [address] [address]  Tel:   [place] Office : [address] [facility] [place] [address]  Tel: [phone]  Cell : [phone]    Subjective/Overnight events: Patient lying in bed comfortably. Denies chest pain, SOB or palpitations  	  MEDICATIONS:  amLODIPine   Tablet 10 milliGRAM(s) Oral daily  heparin   Injectable 5000 Unit(s) SubCutaneous three times a day  hydrALAZINE 25 milliGRAM(s) Oral three times a day  labetalol 100 milliGRAM(s) Oral three times a day  losartan 100 milliGRAM(s) Oral daily        acetaminophen   Tablet .. 650 milliGRAM(s) Oral every 6 hours PRN  oxyCODONE    IR 5 milliGRAM(s) Oral every 8 hours PRN    lactulose Syrup 15 Gram(s) Oral daily PRN  pantoprazole    Tablet 40 milliGRAM(s) Oral before breakfast  polyethylene glycol 3350 17 Gram(s) Oral two times a day  senna 2 Tablet(s) Oral at bedtime  sucralfate suspension 1 Gram(s) Oral four times a day      chlorhexidine 4% Liquid 1 Application(s) Topical daily  epoetin efren-epbx (RETACRIT) Injectable 19204 Unit(s) IV Push <User Schedule>  hydrocortisone hemorrhoidal Suppository 1 Suppository(s) Rectal at bedtime  lidocaine/prilocaine Cream 1 Application(s) Topical <User Schedule>  multivitamin 1 Tablet(s) Oral daily      PAST MEDICAL/SURGICAL HISTORY  PAST MEDICAL & SURGICAL HISTORY:  HTN (hypertension)    Dialysis patient    No significant past surgical history        SOCIAL HISTORY: Substance Use (street drugs): ( x ) never used  (  ) other:    FAMILY HISTORY:  No pertinent family history in first degree relatives        REVIEW OF SYSTEMS:  CONSTITUTIONAL: No fever, weight loss, or fatigue  EYES: No eye pain, visual disturbances, or discharge  ENMT:  No difficulty hearing, tinnitus, vertigo; No sinus or throat pain  BREASTS: No pain, masses, or nipple discharge  GASTROINTESTINAL: No abdominal or epigastric pain. No nausea, vomiting, or hematemesis; No diarrhea or constipation. No melena or hematochezia.  GENITOURINARY: No dysuria, frequency, hematuria, or incontinence  NEUROLOGICAL: No headaches, memory loss, loss of strength, numbness, or tremors  ENDOCRINE: No heat or cold intolerance; No hair loss  MUSCULOSKELETAL: No joint pain or swelling; No muscle, back, or extremity pain  PSYCHIATRIC: No depression, anxiety, mood swings, or difficulty sleeping  HEME/LYMPH: No easy bruising, or bleeding gums  All others negative    PHYSICAL EXAM:  T(C): 37 (11-03-20 @ 12:07), Max: 37 (11-03-20 @ 12:07)  HR: 73 (11-03-20 @ 12:07) (73 - 76)  BP: 147/41 (11-03-20 @ 12:07) (147/41 - 165/50)  RR: 16 (11-03-20 @ 12:07) (16 - 18)  SpO2: 99% (11-03-20 @ 12:07) (95% - 99%)  Wt(kg): --  I&O's Summary    02 Nov 2020 07:01  -  03 Nov 2020 07:00  --------------------------------------------------------  IN: 400 mL / OUT: 1400 mL / NET: -1000 mL          GENERAL: NAD  EYES: EOMI, PERRLA, conjunctiva and sclera clear  ENMT: No tonsillar erythema, exudates, or enlargement  Cardiovascular: Normal S1 S2, No JVD, Holosystolic murmur, No edema  Respiratory: crackles b/l  Gastrointestinal:  Soft, Non-tender, + BS	  Extremities: Normal range of motion, No clubbing, cyanosis or edema  NERVOUS SYSTEM:  Alert & Oriented X3                                    8.1    3.98  )-----------( 117      ( 02 Nov 2020 06:33 )             25.4     11-02    134<L>  |  95<L>  |  49<H>  ----------------------------<  108<H>  3.9   |  27  |  7.03<H>    Ca    9.7      02 Nov 2020 06:33  Phos  3.3     11-02  Mg     2.3     11-02      proBNP:   Lipid Profile:   HgA1c:   TSH:     Consultant(s) Notes Reviewed:  [x ] YES  [ ] NO    Care Discussed with Consultants/Other Providers [ x] YES  [ ] NO    Imaging Personally Reviewed independently:  [x] YES  [ ] NO    All labs, radiologic studies, vitals, orders and medications list reviewed. Patient is seen and examined at bedside. Case discussed with medical team.

## 2020-11-03 NOTE — CHART NOTE - NSCHARTNOTEFT_GEN_A_CORE
Patient consent for blood products, daughter included in conversation, risks and benefits reviewed and patient and daughter agree.  Patient planned for HD tomorrow with 1 unit PRBC for first session in am, discussed with Rolando RN at HD to place patient on first session since pending dc tomorrow.  PRBC ordered.

## 2020-11-03 NOTE — CONSULT NOTE ADULT - SUBJECTIVE AND OBJECTIVE BOX
Chief Complaint:  Patient is a 80y old  Female who presents with a chief complaint of Fall (2020 17:06)    HTN (hypertension)    Dialysis patient    No significant past surgical history       HPI:  79 y/o F with ESRD on HD (MWF), HTN, GERD presents with hip and back pain after a fall.  Pt reports falling after she lost her balance trying to exit her elevator.  She fell landing on her R hip.  Since falling, she has had severe pain in lower back. and R hip and thigh.  Pain is worse with any movement.  She had partial relief from Tylenol.  She reports some abdominal pain after falling.  Pt reports feeling at baseline otherwise without fever, chills, cough, nausea, dyspnea, or LOC.    GI consulted for constipation with rectal bleeding w/bm. Pt endorses increased constipation and noting irritation of her hemorrhoid "inside" when having bowel movements. She has no associated abd pain or n/v. No melena. She has no weight loss or appetite changes.     In the ED, pt was given Tylenol (28 Oct 2020 23:46)      alcohol swabs (Unknown)  IV contrast (Urticaria)  No Known Drug Allergies      acetaminophen   Tablet .. 650 milliGRAM(s) Oral every 6 hours PRN  amLODIPine   Tablet 10 milliGRAM(s) Oral daily  chlorhexidine 4% Liquid 1 Application(s) Topical daily  epoetin efren-epbx (RETACRIT) Injectable 72161 Unit(s) IV Push <User Schedule>  heparin   Injectable 5000 Unit(s) SubCutaneous three times a day  hydrALAZINE 25 milliGRAM(s) Oral three times a day  hydrocortisone hemorrhoidal Suppository 1 Suppository(s) Rectal at bedtime  labetalol 100 milliGRAM(s) Oral three times a day  lactulose Syrup 15 Gram(s) Oral daily PRN  lidocaine/prilocaine Cream 1 Application(s) Topical <User Schedule>  losartan 100 milliGRAM(s) Oral daily  multivitamin 1 Tablet(s) Oral daily  oxyCODONE    IR 5 milliGRAM(s) Oral every 8 hours PRN  pantoprazole    Tablet 40 milliGRAM(s) Oral before breakfast  polyethylene glycol 3350 17 Gram(s) Oral two times a day  senna 2 Tablet(s) Oral at bedtime  sucralfate suspension 1 Gram(s) Oral four times a day        FAMILY HISTORY:  No pertinent family history in first degree relatives          Review of Systems:    General:  No wt loss, fevers, chills, night sweats, fatigue  Eyes:  Good vision, no reported pain  ENT:  No sore throat, pain, runny nose, dysphagia  CV:  No pain, palpitations, no lightheadedness  Resp:  No dyspnea, cough, tachypnea, wheezing  GI: (+) constipation, mild rectal bleeding with straining; no abd pain or n/v; no melena   :  No pain, bleeding, incontinence, nocturia  Muscle:  No pain, weakness  Neuro:  No weakness, tingling, memory problems  Psych:  No fatigue, insomnia, mood problems, depression  Endocrine:  No polyuria, polydypsia, cold/heat intolerance  Heme:  No petechiae, ecchymosis, easy bruisability  Skin:  No rash, tattoos, scars, edema    Relevant Family History:   n/c    Relevant Social History: n/c      Physical Exam:    Vital Signs:  Vital Signs Last 24 Hrs  T(C): 37 (2020 12:07), Max: 37 (2020 12:07)  T(F): 98.6 (2020 12:07), Max: 98.6 (2020 12:07)  HR: 73 (2020 12:07) (73 - 76)  BP: 147/41 (2020 12:07) (147/41 - 165/50)  BP(mean): --  RR: 16 (2020 12:07) (16 - 18)  SpO2: 99% (2020 12:07) (95% - 99%)  Daily     Daily Weight in k.3 (2020 14:20)    General:  Appears stated age, well-groomed, nad  HEENT:  NC/AT,  conjunctivae clear and pink, no thyromegaly, nodules, adenopathy, no JVD  Chest:  Full & symmetric excursion, no increased effort, breath sounds clear  Cardiovascular:  Regular rhythm, S1, S2, no murmur/rub/S3/S4, no abdominal bruit, no edema  Abdomen:  Soft, non-tender, non-distended, normoactive bowel sounds,  no masses ,no hepatosplenomeagaly, no signs of chronic liver disease  Extremities:  no cyanosis,clubbing or edema  Skin:  No rash/erythema/ecchymoses/petechiae/wounds/abscess/warm/dry  Neuro/Psych:  A&Ox3  , no asterixis, no tremor, no encephalopathy    Laboratory:                            8.1    3.98  )-----------( 117      ( 2020 06:33 )             25.4     11-    134<L>  |  95<L>  |  49<H>  ----------------------------<  108<H>  3.9   |  27  |  7.03<H>    Ca    9.7      2020 06:33  Phos  3.3     11-02  Mg     2.3                   Imaging:

## 2020-11-03 NOTE — PROGRESS NOTE ADULT - SUBJECTIVE AND OBJECTIVE BOX
[facility]   MD BARBARA HENSON DO ANAM SIDDIQUI ANGELA WONG, PA    TEL:  OFFICE: [phone]  DR OSEGUERA CELL: [phone]  DR. MITCHELL CELL: [phone]  DR. MORENO CELL: [phone]  CHARU ROWLAND CELL: [phone]    From 5pm-7am Answering Service [phone]    -- RENAL FOLLOW UP NOTE ---Date of Service 11-03-20 @ 12:52    Patient is a 80y old  Female who presents with a chief complaint of Fall (03 Nov 2020 12:07)      Patient seen and examined at bedside. No chest pain/sob    VITALS:  T(F): 98.6 (11-03-20 @ 12:07), Max: 98.6 (11-03-20 @ 12:07)  HR: 73 (11-03-20 @ 12:07)  BP: 147/41 (11-03-20 @ 12:07)  RR: 16 (11-03-20 @ 12:07)  SpO2: 99% (11-03-20 @ 12:07)  Wt(kg): --    11-02 @ 07:01  -  11-03 @ 07:00  --------------------------------------------------------  IN: 400 mL / OUT: 1400 mL / NET: -1000 mL          PHYSICAL EXAM:  Constitutional: NAD  Neck: No JVD  Respiratory: CTAB, no wheezes, rales or rhonchi  Cardiovascular: S1, S2, RRR  Gastrointestinal: BS+, soft, NT/ND  Extremities: No peripheral edema    Hospital Medications:   MEDICATIONS  (STANDING):  amLODIPine   Tablet 10 milliGRAM(s) Oral daily  chlorhexidine 4% Liquid 1 Application(s) Topical daily  epoetin efren-epbx (RETACRIT) Injectable 07820 Unit(s) IV Push <User Schedule>  heparin   Injectable 5000 Unit(s) SubCutaneous three times a day  hydrALAZINE 25 milliGRAM(s) Oral three times a day  hydrocortisone hemorrhoidal Suppository 1 Suppository(s) Rectal at bedtime  labetalol 100 milliGRAM(s) Oral three times a day  lidocaine/prilocaine Cream 1 Application(s) Topical <User Schedule>  losartan 100 milliGRAM(s) Oral daily  multivitamin 1 Tablet(s) Oral daily  pantoprazole    Tablet 40 milliGRAM(s) Oral before breakfast  polyethylene glycol 3350 17 Gram(s) Oral two times a day  senna 2 Tablet(s) Oral at bedtime  sucralfate suspension 1 Gram(s) Oral four times a day      LABS:  11-02    134<L>  |  95<L>  |  49<H>  ----------------------------<  108<H>  3.9   |  27  |  7.03<H>    Ca    9.7      02 Nov 2020 06:33  Phos  3.3     11-02  Mg     2.3     11-02      Creatinine Trend: 7.03 <--, 5.24 <--, 6.82 <--, 5.29 <--, 8.28 <--, 7.11 <--                                8.1    3.98  )-----------( 117      ( 02 Nov 2020 06:33 )             25.4     Urine Studies:  Urinalysis - [10-11-20 @ 09:04]      Color LIGHT YELLOW / Appearance CLEAR / SG 1.010 / pH 8.0      Gluc 50 / Ketone NEGATIVE  / Bili NEGATIVE / Urobili NORMAL       Blood NEGATIVE / Protein 300 / Leuk Est NEGATIVE / Nitrite NEGATIVE      RBC 0-2 / WBC 0-2 / Hyaline NEGATIVE / Gran  / Sq Epi OCC / Non Sq Epi  / Bacteria NEGATIVE      Iron 152, TIBC 211, %sat --      [10-10-20 @ 10:35]  Ferritin 2109      [10-11-20 @ 06:34]  TSH 2.22      [10-10-20 @ 10:35]    HBsAb 16.0      [10-31-20 @ 10:30]  HBsAg NEGATIVE      [10-10-20 @ 17:05]  HBcAb Nonreactive      [10-31-20 @ 10:30]  HCV 0.05, Nonreactive Hepatitis C AB  S/CO Ratio                        Interpretation  < 1.00                                   Non-Reactive  1.00 - 4.99                         Weakly-Reactive  >= 5.00                                Reactive  Non-Reactive: Aperson with a non-reactive HCV antibody  result is considered uninfected.  No further action is  needed unless recent infection is suspected.  In these  cases, consider repeat testing later to detect  seroconversion..  Weakly-Reactive: HCV antibody test is abnormal, HCV RNA  Qualitative test will follow.  Reactive: HCV antibody test is abnormal, HCV RNA  Qualitative test will follow.  Note: HCV antibody testing is performed on the [employer]   system.      [10-31-20 @ 10:30]      RADIOLOGY & ADDITIONAL STUDIES:

## 2020-11-03 NOTE — PROVIDER CONTACT NOTE (OTHER) - NAME OF MD/NP/PA/DO NOTIFIED:
NEUROSURGERY CLINIC CONSULT NOTE     DATE OF VISIT: 6/19/2020     SUBJECTIVE:     Amelia Coker is a pleasant 46 year old female who presents to the clinic today for consultation on low back pain, SI pain and left leg pain. She is referred to the Neurosurgery Clinic by Mr. Mar PA-C, in . Pertinent medical history consists of a L4-5 cystectomy and L5-S1 discectomy.   Today, Ms. Coker reports a 4 year history of symptoms. She describes constant, sharp, aching pain that initiates in the midline to left low lumbar/SI region and radiates distally in the S1 distribution. This pain is not accompanied by paresthesias and numbness in the same distribution. Prolonged walking and standing aggravate the symptoms, while alleviation is obtained by positional changes such as squatting. No mechanism of injury such as trauma or a fall is associated with the onset of the pain. There are no bowel or bladder changes.    She has been evaluated by [facility], Dr. Burrell and [facility]. She has been offered a multi level fusion as well as non-operative pain management treatments     The patient has participated in conservative therapies to include physical therapy which did not provide any significant long term relief.       Current Outpatient Medications:      Acetaminophen (TYLENOL PO), Take 1,000 mg by mouth every 8 hours as needed , Disp: , Rfl:      DULoxetine (CYMBALTA) 60 MG capsule, TAKE ONE CAPSULE BY MOUTH ONCE DAILY, Disp: 90 capsule, Rfl: 0     meloxicam (MOBIC) 15 MG tablet, TAKE ONE TABLET BY MOUTH EVERY DAY (PATIENT WANTS UNICHEM BRAND ONLY), Disp: 90 tablet, Rfl: 2     acyclovir (ZOVIRAX) 5 % ointment, Apply topically 6 times daily (Patient not taking: Reported on 6/19/2020), Disp: 15 g, Rfl: 3     celecoxib (CELEBREX) 200 MG capsule, Take 1 capsule (200 mg) by mouth 2 times daily (Patient not taking: Reported on 6/17/2020), Disp: 60 capsule, Rfl: 0     clobetasol (TEMOVATE) 0.05 % external cream, Apply topically 2  Martha Covarrubias. Pager#[phone] times daily (Patient not taking: Reported on 6/19/2020), Disp: 60 g, Rfl: 1     diazepam (VALIUM) 5 MG tablet, Take 1 tablet (5 mg) by mouth 3 times daily as needed for muscle spasms (Patient not taking: Reported on 5/21/2020), Disp: 30 tablet, Rfl: 0     fexofenadine (ALLEGRA) 180 MG tablet, Take 1 tablet by mouth daily Reported on 4/5/2017, Disp: , Rfl:      mupirocin (BACTROBAN) 2 % external ointment, Apply topically 3 times daily (Patient not taking: Reported on 6/19/2020), Disp: 30 g, Rfl: 1     order for DME, Equipment being ordered: DME [id number] $70  Ankle Support MD Figure 8, Lace up, Disp: 1 Device, Rfl: 0     rizatriptan (MAXALT-MLT) 5 MG ODT, Take 1 tablet (5 mg) by mouth at onset of headache for migraine May repeat in 2 hours. Max 6 tablets/24 hours. (Patient not taking: Reported on 6/19/2020), Disp: 30 tablet, Rfl: 1     sucralfate (CARAFATE) 1 GM tablet, Take 1 tablet (1 g) by mouth 4 times daily (Patient not taking: Reported on 6/19/2020), Disp: 40 tablet, Rfl: 0     Allergies   Allergen Reactions     Ceftriaxone Anaphylaxis     Hives, rash, racing heart beat     Bactrim [Sulfamethoxazole W/Trimethoprim] Hives     Ciprofloxacin Other (See Comments)     Tendon Issues     Codeine Nausea and Vomiting     Copper      Rash       Doxycycline      Loss of skin pigmentation, skin loss.     Gold      Rash       Iodine-131 Hives     Levaquin [Levofloxacin] Other (See Comments)     Tendon issues with levaquin and cipro     Nickel      rash     Steel [Staples]      Rash from staples       Latex Rash     Penicillins Rash        Past Medical History:   Diagnosis Date     Abnormal mammogram 9/21/2016    right breast      AD (atopic dermatitis) 10/29/2015     Allergic rhinitis due to other allergen      Arthritis      Bell's palsy      Chronic fatigue 6/6/2012     Chronic infection     MRSA - 2015 scalp and prior to Abdomen     Contact dermatitis and other eczema, due to unspecified cause     eczema      Contusion of left foot, initial encounter 3/16/2016     DJD (degenerative joint disease), lumbar 9/26/2013     DJD (degenerative joint disease), lumbar 9/26/2013     Ds DNA antibody positive 4/16/2014    borderline.      Elevated blood pressure reading without diagnosis of hypertension 12/24/2013     Facial contusion 12/15/2014    hit by horse      Foraminal stenosis of lumbar region 9/26/2013     Frontal headache 12/15/2014     Gastroesophageal reflux disease, esophagitis presence not specified 6/29/2016     Heat sensitivity 10/29/2015     HTN, goal below 140/90 9/23/2015     Hyperlipidemia LDL goal <130 8/30/2017     Irregular heart beat      Keratitis sicca (H) 10/31/2012     Left shoulder pain 9/26/2013     Lumbar radiculopathy 1/22/2014     Migraine headache 10/23/2013     Migraine, unspecified, with intractable migraine, so stated, without mention of status migrainosus      Motion sickness      MRSA infection 10/20/2015     Muscular wasting and disuse atrophy, not elsewhere classified 6/9/2014    right SCM, right wrist,       Numbness and tingling     both legs anf feet greater on the left     PAC (premature atrial contraction) 7/15/2010     Plantar fasciitis 9/23/2015     PONV (postoperative nausea and vomiting)      Skin lesion 10/20/2015    posterior superior scalp      Spasm of muscle 7/11/2017     Telangiectasia of face 12/19/2014     Tooth pain 10/20/2015    left lower primary molar         ROS: 10 point review of symptoms are negative other than the symptoms noted above in the HPI.     Family History has been reviewed with the patient, there are no pertinent findings to presenting concern.     Past Surgical History:   Procedure Laterality Date     AS INJ TRANSFORAMIN EPIDURAL, LUMB/SACR SINGLE       COLONOSCOPY  3/11/2013    Procedure: COLONOSCOPY;  colonoscopy;  Surgeon: Lobito Mas MD;  Location: [facility]     COLONOSCOPY WITH CO2 INSUFFLATION N/A 11/19/2018    Procedure: COLONOSCOPY WITH CO2  "INSUFFLATION;  Surgeon: Goyo Blank MD;  Location: [facility]     DECOMPRESSION LUMBAR TWO LEVELS Bilateral 12/8/2016    Procedure: DECOMPRESSION LUMBAR TWO LEVELS;  Surgeon: Bang Burrell MD;  Location: [facility]     DILATION AND CURETTAGE, HYSTEROSCOPY, ABLATE ENDOMETRIUM NOVASURE, COMBINED N/A 6/12/2019    Procedure: hysteroscopy, dilation & curettage, polypectomy, endometrial ablation;  Surgeon: Fredy Pham MD;  Location: [facility]     ESOPHAGOSCOPY, GASTROSCOPY, DUODENOSCOPY (EGD), COMBINED  11/8/2013    Procedure: COMBINED ESOPHAGOSCOPY, GASTROSCOPY, DUODENOSCOPY (EGD), BIOPSY SINGLE OR MULTIPLE;  Esophagoscopy, Gastroscopy, Duodenoscopy EGD with multiple biopsies;  Surgeon: Teja Kohc MD;  Location: [facility]     HC REMOVAL OF TONSILS,<13 Y/O      Tonsils <11y.o.     HC TOOTH EXTRACTION W/FORCEP       REPAIR TENDON ELBOW  7/9/2014    Procedure: REPAIR TENDON ELBOW;  Surgeon: Chris Johnston MD;  Location: [facility]     ULTRASONIC REMOVAL SOFT TISSUE (LOCATION) Right 11/21/2018    Procedure: Tenex right foot;  Surgeon: Patel Martínez DO;  Location: [facility]        Social History     Tobacco Use     Smoking status: Never Smoker     Smokeless tobacco: Never Used   Substance Use Topics     Alcohol use: No     Comment: social     Drug use: No        OBJECTIVE:   /80   Pulse 82   Temp 98.7  F (37.1  C)   Ht 1.67 m (5' 5.75\")   Wt 92.5 kg (204 lb)   SpO2 97%   BMI 33.18 kg/m     Body mass index is 33.18 kg/m .     Imaging:     Reviewed reports, no imaging available.     Full radiological report in chart. Imaging reviewed with with patient today.     Exam:     Patient appears comfortable, conversational, and in no apparent distress.   Head: Normocephalic, without obvious abnormality, atraumatic, no facial asymmetry.   Eyes: conjunctivae/corneas clear. PERRL, EOM's intact.   Throat: lips, mucosa, and tongue normal; teeth and gums normal.   Neck: supple, symmetrical, trachea " midline, no adenopathy and thyroid: not enlarged, symmetric, no tenderness/mass/nodules.   Lungs: clear to auscultation bilaterally.   Heart: regular rate and rhythm.   Abdomen: soft, non-tender; bowel sounds normal; no masses, no organomegaly.   Pulses: 2+ and symmetric.   Skin: Skin color, texture, turgor normal. No rashes or lesions.     CN II-XII grossly intact, alert and appropriate with conversation and following commands.   Gait is non-antalgic. Able to tandem walk. Able to walk on toes and heels without difficulty.   Cervical spine is non tender to palpation. Appropriate range of motion of neck, not concerning for lhermitte's phenomenon.   Bilateral bicep 2/4 and tricep reflexes 1/4. Sensation intact throughout upper extremities.     UE muscle strength  Right  Left    Deltoid  5/5  5/5    Biceps  5/5  5/5    Triceps  5/5  5/5    Hand intrinsics  5/5  5/5    Hand grasp  5/5  5/5    Toscano signs  neg  neg      Lumbar spine is tender to palpation at left paraspinal region and SI joint.   Intact sensation throughout lower extremities.   Bilateral patellar 2/4 and achilles reflex 1/4. Positive for pain with straight leg raise on left.     LE muscle strength  Right  Left    Iliopsoas (hip flexion)  5/5  5/5    Quad (knee extension)  5/5  5/5    Hamstring (knee flexion)  5/5  5/5    Gastrocnemius (PF)  5/5  5/5    Tibialis Ant. (DF)  5/5  5/5    EHL  5/5  5/5      Negative Babinski bilaterally. Negative for clonus.   Calves are soft and non-tender bilaterally.     ASSESSMENT/PLAN:     Amelia Coker is a 46 year old female who presents to the clinic for consultation on low back pain, SI pain and left leg pain. The patient's most recent imaging was reviewed with her today. On exam, the patient is noted to have tenderness to palpation at the left paraspinal region and SI joint.   Intact sensation throughout lower extremities with a Positive SLR on the left.    Based on her physical exam, imaging review and past  treatments, we feel that it would be in Ms. Coker's best interest to try a conservative approach by participating in a program initiated by our colleagues at the [facility]. She did inquire about possible treatment options that may be consider so we briefly discussed core stretching and strengthening exercises in conjunction with SI injections or even ablations as possibilities, but again, we explained that treatments will be determined by the team at the [facility]. We would like to see her back as needed to further discuss possible surgical interventions. In the event that patient's symptoms worsen or change we would like to see her sooner.        Respectfully,     SHEREEN Lambert, SONAM  [facility]  [facility]  Tel: [phone]  Pager: [phone]     Exam, imaging, and plan reviewed by Dr. Kong.

## 2020-11-03 NOTE — PROGRESS NOTE ADULT - ASSESSMENT
81 yo female Tajik speaking but able to communicate with english w/ pmhx ESRD on dialysis (MWF) via AVF from [place] [facility], HTN present with mechanical fall    ESRD on HD via AVF MWF  From [place] [facility] unit  HD consent in the chart  HD 11/2 with 1. L UF,   HD per MWF schedule  pending rehab placement    HTN  Control is better.  - Continue home BP medications--losartan, labetalol, amlodipine hydralazine    Anemia  stable  epo ordered.   monitor    HIP PAIN.  Non-displaced Fracture.

## 2020-11-03 NOTE — CONSULT NOTE ADULT - ASSESSMENT
79 yo female Bulgarian speaking but able to communicate with english w/ pmhx ESRD on dialysis (MWF) via AVF from [place] [facility] unit, HTN present with mechanical fall    ESRD on HD via AVF MWF  from [place] [facility] unit  HD consent in the chart  last HD 10/26, due for HD today  pending BMP    HTN  monitor  continue home BP medications--losartan, labetalol, amlodipine hydralazine    Anemia  s/p recent transfusion  pending cbc  transfuse to keep hb>8  epo with dialysis if Hb<11  monitor    fall/hip pain  check xray  work up per team  
81 y/o F with ESRD on HD (MWF), HTN, GERD presents with hip and back pain after a fall. GI consulted for constipation
  Assessment and Plan    79 y/o F with ESRD on HD (MWF), HTN, GERD presents with fall    1. s/p fall  -patient states she tripped coming off elevator  -denies dizziness, LOC, chest pain or SOB  -no syncopal event  -mechanical fall  -echo from recent admission shows mild MR and normal LV function    2. HTN  -BP controlled  -continue with norvasc, labetalol and losartan and hydralazine  -continue to monitor BP    3. ESRD  -on HD  -f/u renal

## 2020-11-04 ENCOUNTER — TRANSCRIPTION ENCOUNTER (OUTPATIENT)
Age: 80
End: 2020-11-04

## 2020-11-04 LAB
ANION GAP SERPL CALC-SCNC: 12 MMO/L — SIGNIFICANT CHANGE UP (ref 7–14)
BUN SERPL-MCNC: 60 MG/DL — HIGH (ref 7–23)
CALCIUM SERPL-MCNC: 9.4 MG/DL — SIGNIFICANT CHANGE UP (ref 8.4–10.5)
CHLORIDE SERPL-SCNC: 95 MMOL/L — LOW (ref 98–107)
CO2 SERPL-SCNC: 27 MMOL/L — SIGNIFICANT CHANGE UP (ref 22–31)
CREAT SERPL-MCNC: 6.82 MG/DL — HIGH (ref 0.5–1.3)
GLUCOSE SERPL-MCNC: 98 MG/DL — SIGNIFICANT CHANGE UP (ref 70–99)
HCT VFR BLD CALC: 23 % — LOW (ref 34.5–45)
HCT VFR BLD CALC: 32.4 % — LOW (ref 34.5–45)
HGB BLD-MCNC: 10.2 G/DL — LOW (ref 11.5–15.5)
HGB BLD-MCNC: 7.4 G/DL — LOW (ref 11.5–15.5)
MAGNESIUM SERPL-MCNC: 2.2 MG/DL — SIGNIFICANT CHANGE UP (ref 1.6–2.6)
MCHC RBC-ENTMCNC: 30.8 PG — SIGNIFICANT CHANGE UP (ref 27–34)
MCHC RBC-ENTMCNC: 31.5 % — LOW (ref 32–36)
MCHC RBC-ENTMCNC: 32.2 % — SIGNIFICANT CHANGE UP (ref 32–36)
MCHC RBC-ENTMCNC: 32.7 PG — SIGNIFICANT CHANGE UP (ref 27–34)
MCV RBC AUTO: 101.8 FL — HIGH (ref 80–100)
MCV RBC AUTO: 97.9 FL — SIGNIFICANT CHANGE UP (ref 80–100)
NRBC # FLD: 0 K/UL — SIGNIFICANT CHANGE UP (ref 0–0)
NRBC # FLD: 0 K/UL — SIGNIFICANT CHANGE UP (ref 0–0)
PHOSPHATE SERPL-MCNC: 2.9 MG/DL — SIGNIFICANT CHANGE UP (ref 2.5–4.5)
PLATELET # BLD AUTO: 139 K/UL — LOW (ref 150–400)
PLATELET # BLD AUTO: 150 K/UL — SIGNIFICANT CHANGE UP (ref 150–400)
PMV BLD: 10.2 FL — SIGNIFICANT CHANGE UP (ref 7–13)
PMV BLD: 9.7 FL — SIGNIFICANT CHANGE UP (ref 7–13)
POTASSIUM SERPL-MCNC: 3.7 MMOL/L — SIGNIFICANT CHANGE UP (ref 3.5–5.3)
POTASSIUM SERPL-SCNC: 3.7 MMOL/L — SIGNIFICANT CHANGE UP (ref 3.5–5.3)
RBC # BLD: 2.26 M/UL — LOW (ref 3.8–5.2)
RBC # BLD: 3.31 M/UL — LOW (ref 3.8–5.2)
RBC # FLD: 15.9 % — HIGH (ref 10.3–14.5)
RBC # FLD: 18.6 % — HIGH (ref 10.3–14.5)
SODIUM SERPL-SCNC: 134 MMOL/L — LOW (ref 135–145)
WBC # BLD: 3.78 K/UL — LOW (ref 3.8–10.5)
WBC # BLD: 4.32 K/UL — SIGNIFICANT CHANGE UP (ref 3.8–10.5)
WBC # FLD AUTO: 3.78 K/UL — LOW (ref 3.8–10.5)
WBC # FLD AUTO: 4.32 K/UL — SIGNIFICANT CHANGE UP (ref 3.8–10.5)

## 2020-11-04 RX ORDER — HYDROCORTISONE 1 %
1 OINTMENT (GRAM) TOPICAL
Qty: 0 | Refills: 0 | DISCHARGE
Start: 2020-11-04

## 2020-11-04 RX ORDER — SENNA PLUS 8.6 MG/1
2 TABLET ORAL
Qty: 0 | Refills: 0 | DISCHARGE
Start: 2020-11-04

## 2020-11-04 RX ORDER — LACTULOSE 10 G/15ML
22.5 SOLUTION ORAL
Qty: 0 | Refills: 0 | DISCHARGE
Start: 2020-11-04

## 2020-11-04 RX ORDER — FERRIC CITRATE 210 MG/1
1 TABLET, COATED ORAL
Qty: 0 | Refills: 0 | DISCHARGE

## 2020-11-04 RX ORDER — POLYETHYLENE GLYCOL 3350 17 G/17G
17 POWDER, FOR SOLUTION ORAL
Qty: 0 | Refills: 0 | DISCHARGE
Start: 2020-11-04

## 2020-11-04 RX ADMIN — HEPARIN SODIUM 5000 UNIT(S): 5000 INJECTION INTRAVENOUS; SUBCUTANEOUS at 23:00

## 2020-11-04 RX ADMIN — POLYETHYLENE GLYCOL 3350 17 GRAM(S): 17 POWDER, FOR SOLUTION ORAL at 17:06

## 2020-11-04 RX ADMIN — Medication 1 TABLET(S): at 12:24

## 2020-11-04 RX ADMIN — Medication 650 MILLIGRAM(S): at 09:45

## 2020-11-04 RX ADMIN — Medication 25 MILLIGRAM(S): at 17:05

## 2020-11-04 RX ADMIN — Medication 100 MILLIGRAM(S): at 17:05

## 2020-11-04 RX ADMIN — AMLODIPINE BESYLATE 10 MILLIGRAM(S): 2.5 TABLET ORAL at 12:23

## 2020-11-04 RX ADMIN — LIDOCAINE AND PRILOCAINE CREAM 1 APPLICATION(S): 25; 25 CREAM TOPICAL at 05:17

## 2020-11-04 RX ADMIN — Medication 25 MILLIGRAM(S): at 23:00

## 2020-11-04 RX ADMIN — Medication 25 MILLIGRAM(S): at 08:49

## 2020-11-04 RX ADMIN — Medication 100 MILLIGRAM(S): at 23:04

## 2020-11-04 RX ADMIN — Medication 650 MILLIGRAM(S): at 23:50

## 2020-11-04 RX ADMIN — Medication 1 GRAM(S): at 13:40

## 2020-11-04 RX ADMIN — Medication 1 GRAM(S): at 17:06

## 2020-11-04 RX ADMIN — Medication 1 GRAM(S): at 05:17

## 2020-11-04 RX ADMIN — Medication 650 MILLIGRAM(S): at 23:04

## 2020-11-04 RX ADMIN — Medication 650 MILLIGRAM(S): at 09:04

## 2020-11-04 RX ADMIN — HEPARIN SODIUM 5000 UNIT(S): 5000 INJECTION INTRAVENOUS; SUBCUTANEOUS at 17:06

## 2020-11-04 RX ADMIN — CHLORHEXIDINE GLUCONATE 1 APPLICATION(S): 213 SOLUTION TOPICAL at 12:24

## 2020-11-04 RX ADMIN — LOSARTAN POTASSIUM 100 MILLIGRAM(S): 100 TABLET, FILM COATED ORAL at 12:23

## 2020-11-04 RX ADMIN — Medication 1 GRAM(S): at 23:04

## 2020-11-04 RX ADMIN — Medication 100 MILLIGRAM(S): at 08:49

## 2020-11-04 RX ADMIN — ERYTHROPOIETIN 10000 UNIT(S): 10000 INJECTION, SOLUTION INTRAVENOUS; SUBCUTANEOUS at 08:50

## 2020-11-04 RX ADMIN — PANTOPRAZOLE SODIUM 40 MILLIGRAM(S): 20 TABLET, DELAYED RELEASE ORAL at 05:17

## 2020-11-04 NOTE — PROGRESS NOTE ADULT - SUBJECTIVE AND OBJECTIVE BOX
Stevie Zamorano MD  Interventional Cardiology / Endovascular Specialist  [place] Office : [address] [place] [address].[place]. [address]  Tel:   [place] Office : [address] [facility] [place] [address]  Tel: [phone]  Cell : [phone]      Subjective/Overnight events: Patient lying in bed comfortably. Denies chest pain, SOB or palpitations  	  MEDICATIONS:  amLODIPine   Tablet 10 milliGRAM(s) Oral daily  heparin   Injectable 5000 Unit(s) SubCutaneous three times a day  hydrALAZINE 25 milliGRAM(s) Oral three times a day  labetalol 100 milliGRAM(s) Oral three times a day  losartan 100 milliGRAM(s) Oral daily        acetaminophen   Tablet .. 650 milliGRAM(s) Oral every 6 hours PRN  oxyCODONE    IR 5 milliGRAM(s) Oral every 8 hours PRN    lactulose Syrup 15 Gram(s) Oral daily PRN  pantoprazole    Tablet 40 milliGRAM(s) Oral before breakfast  polyethylene glycol 3350 17 Gram(s) Oral two times a day  senna 2 Tablet(s) Oral at bedtime  sucralfate suspension 1 Gram(s) Oral four times a day      chlorhexidine 4% Liquid 1 Application(s) Topical daily  epoetin efren-epbx (RETACRIT) Injectable 87489 Unit(s) IV Push <User Schedule>  hydrocortisone hemorrhoidal Suppository 1 Suppository(s) Rectal at bedtime  lidocaine/prilocaine Cream 1 Application(s) Topical <User Schedule>  multivitamin 1 Tablet(s) Oral daily      PAST MEDICAL/SURGICAL HISTORY  PAST MEDICAL & SURGICAL HISTORY:  HTN (hypertension)    Dialysis patient    No significant past surgical history        SOCIAL HISTORY: Substance Use (street drugs): ( x ) never used  (  ) other:    FAMILY HISTORY:  No pertinent family history in first degree relatives        REVIEW OF SYSTEMS:  CONSTITUTIONAL: No fever, weight loss, or fatigue  EYES: No eye pain, visual disturbances, or discharge  ENMT:  No difficulty hearing, tinnitus, vertigo; No sinus or throat pain  BREASTS: No pain, masses, or nipple discharge  GASTROINTESTINAL: No abdominal or epigastric pain. No nausea, vomiting, or hematemesis; No diarrhea or constipation. No melena or hematochezia.  GENITOURINARY: No dysuria, frequency, hematuria, or incontinence  NEUROLOGICAL: No headaches, memory loss, loss of strength, numbness, or tremors  ENDOCRINE: No heat or cold intolerance; No hair loss  MUSCULOSKELETAL: No joint pain or swelling; No muscle, back, or extremity pain  PSYCHIATRIC: No depression, anxiety, mood swings, or difficulty sleeping  HEME/LYMPH: No easy bruising, or bleeding gums  All others negative    PHYSICAL EXAM:  T(C): 36.6 (11-04-20 @ 13:08), Max: 36.9 (11-04-20 @ 06:30)  HR: 69 (11-04-20 @ 13:08) (68 - 78)  BP: 157/77 (11-04-20 @ 13:08) (151/50 - 163/67)  RR: 17 (11-04-20 @ 13:08) (17 - 18)  SpO2: 96% (11-04-20 @ 05:14) (96% - 96%)  Wt(kg): --  I&O's Summary    04 Nov 2020 07:01  -  04 Nov 2020 15:44  --------------------------------------------------------  IN: 500 mL / OUT: 2100 mL / NET: -1600 mL            GENERAL: NAD  EYES: EOMI, PERRLA, conjunctiva and sclera clear  ENMT: No tonsillar erythema, exudates, or enlargement  Cardiovascular: Normal S1 S2, No JVD, Holosystolic murmur, No edema  Respiratory: crackles b/l  Gastrointestinal:  Soft, Non-tender, + BS	  Extremities: Normal range of motion, No clubbing, cyanosis or edema  NERVOUS SYSTEM:  Alert & Oriented X3                                    10.2   4.32  )-----------( 150      ( 04 Nov 2020 09:41 )             32.4     11-04    134<L>  |  95<L>  |  60<H>  ----------------------------<  98  3.7   |  27  |  6.82<H>    Ca    9.4      04 Nov 2020 06:40  Phos  2.9     11-04  Mg     2.2     11-04      proBNP:   Lipid Profile:   HgA1c:   TSH:     Consultant(s) Notes Reviewed:  [x ] YES  [ ] NO    Care Discussed with Consultants/Other Providers [ x] YES  [ ] NO    Imaging Personally Reviewed independently:  [x] YES  [ ] NO    All labs, radiologic studies, vitals, orders and medications list reviewed. Patient is seen and examined at bedside. Case discussed with medical team.

## 2020-11-04 NOTE — PROGRESS NOTE ADULT - SUBJECTIVE AND OBJECTIVE BOX
INTERVAL HPI/OVERNIGHT EVENTS:    doing better   no further blood with bm yesterday evening  tolerating diet   great resp to prbc yesterday     MEDICATIONS  (STANDING):  amLODIPine   Tablet 10 milliGRAM(s) Oral daily  chlorhexidine 4% Liquid 1 Application(s) Topical daily  epoetin efren-epbx (RETACRIT) Injectable 72993 Unit(s) IV Push <User Schedule>  heparin   Injectable 5000 Unit(s) SubCutaneous three times a day  hydrALAZINE 25 milliGRAM(s) Oral three times a day  hydrocortisone hemorrhoidal Suppository 1 Suppository(s) Rectal at bedtime  labetalol 100 milliGRAM(s) Oral three times a day  lidocaine/prilocaine Cream 1 Application(s) Topical <User Schedule>  losartan 100 milliGRAM(s) Oral daily  multivitamin 1 Tablet(s) Oral daily  pantoprazole    Tablet 40 milliGRAM(s) Oral before breakfast  polyethylene glycol 3350 17 Gram(s) Oral two times a day  senna 2 Tablet(s) Oral at bedtime  sucralfate suspension 1 Gram(s) Oral four times a day    MEDICATIONS  (PRN):  acetaminophen   Tablet .. 650 milliGRAM(s) Oral every 6 hours PRN Temp greater or equal to 38C (100.4F), Moderate Pain (4 - 6), Severe Pain (7 - 10)  lactulose Syrup 15 Gram(s) Oral daily PRN constipation  oxyCODONE    IR 5 milliGRAM(s) Oral every 8 hours PRN Severe Pain (7 - 10)      Allergies    alcohol swabs (Unknown)  IV contrast (Urticaria)  No Known Drug Allergies    Intolerances        Review of Systems:    General:  No wt loss, fevers, chills, night sweats, fatigue   Eyes:  Good vision, no reported pain  ENT:  No sore throat, pain, runny nose, dysphagia  CV:  No pain, palpitations, hypo/hypertension  Resp:  No dyspnea, cough, tachypnea, wheezing  GI:  No pain, No nausea, No vomiting, No diarrhea, No constipation, No weight loss, No fever, No pruritis, No rectal bleeding, No melena, No dysphagia  :  No pain, bleeding, incontinence, nocturia  Muscle:  No pain, weakness  Neuro:  No weakness, tingling, memory problems  Psych:  No fatigue, insomnia, mood problems, depression  Endocrine:  No polyuria, polydypsia, cold/heat intolerance  Heme:  No petechiae, ecchymosis, easy bruisability  Skin:  No rash, tattoos, scars, edema      Vital Signs Last 24 Hrs  T(C): 36.4 (04 Nov 2020 09:35), Max: 36.9 (04 Nov 2020 06:30)  T(F): 97.5 (04 Nov 2020 09:35), Max: 98.4 (04 Nov 2020 06:30)  HR: 68 (04 Nov 2020 09:35) (68 - 78)  BP: 163/67 (04 Nov 2020 09:35) (151/50 - 163/67)  BP(mean): --  RR: 17 (04 Nov 2020 09:35) (17 - 18)  SpO2: 96% (04 Nov 2020 05:14) (96% - 96%)    PHYSICAL EXAM:    Constitutional: NAD  HEENT: EOMI, throat clear  Neck: No LAD, supple  Respiratory: CTA and P  Cardiovascular: S1 and S2, RRR, no M  Gastrointestinal: BS+, soft, NT/ND, neg HSM,  Extremities: No peripheral edema, neg clubbing, cyanosis  Vascular: 2+ peripheral pulses  Neurological: A/O x 3, no focal deficits  Psychiatric: Normal mood, normal affect  Skin: No rashes      LABS:                        10.2   4.32  )-----------( 150      ( 04 Nov 2020 09:41 )             32.4     11-04    134<L>  |  95<L>  |  60<H>  ----------------------------<  98  3.7   |  27  |  6.82<H>    Ca    9.4      04 Nov 2020 06:40  Phos  2.9     11-04  Mg     2.2     11-04            RADIOLOGY & ADDITIONAL TESTS:

## 2020-11-04 NOTE — PROGRESS NOTE ADULT - ASSESSMENT
79 yo female Syriac speaking but able to communicate with english w/ pmhx ESRD on dialysis (MWF) via AVF from [facility] unit, HTN present with mechanical fall    ESRD on HD via AVF MWF  From [facility] unit  HD consent in the chart  HD 11/2 with 1. L UF,   HD per MWF schedule  Seen in HD today. Continue HD as ordered. tolerating well. vitals stable. access working well   pending rehab placement    HTN  Control is better.  - Continue home BP medications--losartan, labetalol, amlodipine hydralazine    Anemia  transfuse to keep hb>9 (aditya requirement)  epo ordered.   monitor    HIP PAIN.  Non-displaced Fracture.     yes

## 2020-11-04 NOTE — PROGRESS NOTE ADULT - SUBJECTIVE AND OBJECTIVE BOX
[facility]   MD BARBARA HENSON DO ANAM SIDDIQUI ANGELA WONG, PA    TEL:  OFFICE: [phone]  DR OSEGUERA CELL: [phone]  DR. MITCHELL CELL: [phone]  DR. MORENO CELL: [phone]  CHARU ROWLAND CELL: [phone]    From 5pm-7am Answering Service [phone]    -- RENAL FOLLOW UP NOTE ---Date of Service 11-04-20 @ 09:41    Patient is a 80y old  Female who presents with a chief complaint of Fall (03 Nov 2020 15:41)      Patient seen and examined in HD. No chest pain/sob    VITALS:  T(F): 98.4 (11-04-20 @ 06:30), Max: 98.6 (11-03-20 @ 12:07)  HR: 72 (11-04-20 @ 06:30)  BP: 156/48 (11-04-20 @ 06:30)  RR: 17 (11-04-20 @ 06:30)  SpO2: 96% (11-04-20 @ 05:14)  Wt(kg): --  flow 350        PHYSICAL EXAM:  Constitutional: NAD  Neck: No JVD  Respiratory: CTAB, no wheezes, rales or rhonchi  Cardiovascular: S1, S2, RRR  Gastrointestinal: BS+, soft, NT/ND  Extremities: No peripheral edema    Hospital Medications:   MEDICATIONS  (STANDING):  amLODIPine   Tablet 10 milliGRAM(s) Oral daily  chlorhexidine 4% Liquid 1 Application(s) Topical daily  epoetin efren-epbx (RETACRIT) Injectable 06230 Unit(s) IV Push <User Schedule>  heparin   Injectable 5000 Unit(s) SubCutaneous three times a day  hydrALAZINE 25 milliGRAM(s) Oral three times a day  hydrocortisone hemorrhoidal Suppository 1 Suppository(s) Rectal at bedtime  labetalol 100 milliGRAM(s) Oral three times a day  lidocaine/prilocaine Cream 1 Application(s) Topical <User Schedule>  losartan 100 milliGRAM(s) Oral daily  multivitamin 1 Tablet(s) Oral daily  pantoprazole    Tablet 40 milliGRAM(s) Oral before breakfast  polyethylene glycol 3350 17 Gram(s) Oral two times a day  senna 2 Tablet(s) Oral at bedtime  sucralfate suspension 1 Gram(s) Oral four times a day      LABS:  11-04    134<L>  |  95<L>  |  60<H>  ----------------------------<  98  3.7   |  27  |  6.82<H>    Ca    9.4      04 Nov 2020 06:40  Phos  2.9     11-04  Mg     2.2     11-04      Creatinine Trend: 6.82 <--, 7.03 <--, 5.24 <--, 6.82 <--, 5.29 <--, 8.28 <--, 7.11 <--    Phosphorus Level, Serum: 2.9 mg/dL (11-04 @ 06:40)                              7.4    3.78  )-----------( 139      ( 04 Nov 2020 06:40 )             23.0     Urine Studies:  Urinalysis - [10-11-20 @ 09:04]      Color LIGHT YELLOW / Appearance CLEAR / SG 1.010 / pH 8.0      Gluc 50 / Ketone NEGATIVE  / Bili NEGATIVE / Urobili NORMAL       Blood NEGATIVE / Protein 300 / Leuk Est NEGATIVE / Nitrite NEGATIVE      RBC 0-2 / WBC 0-2 / Hyaline NEGATIVE / Gran  / Sq Epi OCC / Non Sq Epi  / Bacteria NEGATIVE      Iron 152, TIBC 211, %sat --      [10-10-20 @ 10:35]  Ferritin 2109      [10-11-20 @ 06:34]  TSH 2.22      [10-10-20 @ 10:35]    HBsAb 16.0      [10-31-20 @ 10:30]  HBsAg NEGATIVE      [10-10-20 @ 17:05]  HBcAb Nonreactive      [10-31-20 @ 10:30]  HCV 0.05, Nonreactive Hepatitis C AB  S/CO Ratio                        Interpretation  < 1.00                                   Non-Reactive  1.00 - 4.99                         Weakly-Reactive  >= 5.00                                Reactive  Non-Reactive: Aperson with a non-reactive HCV antibody  result is considered uninfected.  No further action is  needed unless recent infection is suspected.  In these  cases, consider repeat testing later to detect  seroconversion..  Weakly-Reactive: HCV antibody test is abnormal, HCV RNA  Qualitative test will follow.  Reactive: HCV antibody test is abnormal, HCV RNA  Qualitative test will follow.  Note: HCV antibody testing is performed on the Abbott   system.      [10-31-20 @ 10:30]      RADIOLOGY & ADDITIONAL STUDIES:

## 2020-11-04 NOTE — PROGRESS NOTE ADULT - ASSESSMENT
79 y/o F with ESRD on HD (MWF), HTN, GERD presents with hip and back pain after a fall. GI consulted for constipation

## 2020-11-04 NOTE — PROGRESS NOTE ADULT - NUTRITIONAL ASSESSMENT
79 y/o F with ESRD on HD (MWF), HTN, GERD presents with hip and back pain after a fall.      Hip pain.  Plan: 2/2 Fall  CT reviewed  - no intervention as per orthi   - Tylenol PRN with oxycodone if pain uncontrolled  - PT.      ESRD (end stage renal disease) on dialysis.  Plan: - nephrology consult reviewed  - cw as HD      Essential hypertension.  Plan: - Continue home BP meds.     Anemia Plan rectal bleed  likely acute blood loss anemia  sp transfusion  GI fu     dc planning

## 2020-11-04 NOTE — PROGRESS NOTE ADULT - SUBJECTIVE AND OBJECTIVE BOX
Patient is a 80y old  Female who presents with a chief complaint of Fall (04 Nov 2020 15:44)      INTERVAL HPI/OVERNIGHT EVENTS:  T(C): 36.6 (11-04-20 @ 13:08), Max: 36.9 (11-04-20 @ 06:30)  HR: 69 (11-04-20 @ 13:08) (68 - 78)  BP: 157/77 (11-04-20 @ 13:08) (151/50 - 163/67)  RR: 17 (11-04-20 @ 13:08) (17 - 18)  SpO2: 96% (11-04-20 @ 05:14) (96% - 96%)  Wt(kg): --  I&O's Summary    04 Nov 2020 07:01  -  04 Nov 2020 18:40  --------------------------------------------------------  IN: 500 mL / OUT: 2100 mL / NET: -1600 mL        LABS:                        10.2   4.32  )-----------( 150      ( 04 Nov 2020 09:41 )             32.4     11-04    134<L>  |  95<L>  |  60<H>  ----------------------------<  98  3.7   |  27  |  6.82<H>    Ca    9.4      04 Nov 2020 06:40  Phos  2.9     11-04  Mg     2.2     11-04          CAPILLARY BLOOD GLUCOSE                MEDICATIONS  (STANDING):  amLODIPine   Tablet 10 milliGRAM(s) Oral daily  chlorhexidine 4% Liquid 1 Application(s) Topical daily  epoetin efren-epbx (RETACRIT) Injectable 02877 Unit(s) IV Push <User Schedule>  heparin   Injectable 5000 Unit(s) SubCutaneous three times a day  hydrALAZINE 25 milliGRAM(s) Oral three times a day  hydrocortisone hemorrhoidal Suppository 1 Suppository(s) Rectal at bedtime  labetalol 100 milliGRAM(s) Oral three times a day  lidocaine/prilocaine Cream 1 Application(s) Topical <User Schedule>  losartan 100 milliGRAM(s) Oral daily  multivitamin 1 Tablet(s) Oral daily  pantoprazole    Tablet 40 milliGRAM(s) Oral before breakfast  polyethylene glycol 3350 17 Gram(s) Oral two times a day  senna 2 Tablet(s) Oral at bedtime  sucralfate suspension 1 Gram(s) Oral four times a day    MEDICATIONS  (PRN):  acetaminophen   Tablet .. 650 milliGRAM(s) Oral every 6 hours PRN Temp greater or equal to 38C (100.4F), Moderate Pain (4 - 6), Severe Pain (7 - 10)  lactulose Syrup 15 Gram(s) Oral daily PRN constipation  oxyCODONE    IR 5 milliGRAM(s) Oral every 8 hours PRN Severe Pain (7 - 10)          PHYSICAL EXAM:  GENERAL: NAD, well-groomed, well-developed  HEAD:  Atraumatic, Normocephalic  CHEST/LUNG: Clear to percussion bilaterally; No rales, rhonchi, wheezing, or rubs  HEART: Regular rate and rhythm; No murmurs, rubs, or gallops  ABDOMEN: Soft, Nontender, Nondistended; Bowel sounds present  EXTREMITIES:  2+ Peripheral Pulses, No clubbing, cyanosis, or edema  LYMPH: No lymphadenopathy noted  SKIN: No rashes or lesions    Care Discussed with Consultants/Other Providers [ ] YES  [ ] NO

## 2020-11-04 NOTE — PROGRESS NOTE ADULT - PROBLEM SELECTOR PLAN 1
-h/h stable   -2/2 irritation of internal hemorrhoid w/straining to have a bm   -anusol supp qhs   -cont bowel regimen   -high fiber diet

## 2020-11-04 NOTE — DISCHARGE NOTE NURSING/CASE MANAGEMENT/SOCIAL WORK - PATIENT PORTAL LINK FT
You can access the FollowMyHealth Patient Portal offered by [facility] by registering at the following website: [email or web address]. By joining [employer]’s FollowMyHealth portal, you will also be able to view your health information using other applications (apps) compatible with our system.

## 2020-11-05 VITALS
HEART RATE: 84 BPM | DIASTOLIC BLOOD PRESSURE: 60 MMHG | RESPIRATION RATE: 17 BRPM | OXYGEN SATURATION: 97 % | SYSTOLIC BLOOD PRESSURE: 153 MMHG | TEMPERATURE: 98 F

## 2020-11-05 RX ORDER — LACTULOSE 10 G/15ML
15 SOLUTION ORAL ONCE
Refills: 0 | Status: COMPLETED | OUTPATIENT
Start: 2020-11-05 | End: 2020-11-05

## 2020-11-05 RX ADMIN — Medication 650 MILLIGRAM(S): at 06:52

## 2020-11-05 RX ADMIN — LOSARTAN POTASSIUM 100 MILLIGRAM(S): 100 TABLET, FILM COATED ORAL at 06:04

## 2020-11-05 RX ADMIN — Medication 650 MILLIGRAM(S): at 06:13

## 2020-11-05 RX ADMIN — Medication 100 MILLIGRAM(S): at 06:02

## 2020-11-05 RX ADMIN — AMLODIPINE BESYLATE 10 MILLIGRAM(S): 2.5 TABLET ORAL at 06:03

## 2020-11-05 RX ADMIN — LACTULOSE 15 GRAM(S): 10 SOLUTION ORAL at 12:38

## 2020-11-05 RX ADMIN — CHLORHEXIDINE GLUCONATE 1 APPLICATION(S): 213 SOLUTION TOPICAL at 12:38

## 2020-11-05 RX ADMIN — Medication 1 TABLET(S): at 12:38

## 2020-11-05 RX ADMIN — HEPARIN SODIUM 5000 UNIT(S): 5000 INJECTION INTRAVENOUS; SUBCUTANEOUS at 06:02

## 2020-11-05 RX ADMIN — Medication 25 MILLIGRAM(S): at 06:03

## 2020-11-05 RX ADMIN — Medication 1 GRAM(S): at 06:02

## 2020-11-05 RX ADMIN — Medication 1 GRAM(S): at 12:38

## 2020-11-05 RX ADMIN — PANTOPRAZOLE SODIUM 40 MILLIGRAM(S): 20 TABLET, DELAYED RELEASE ORAL at 06:03

## 2020-11-05 NOTE — PROVIDER CONTACT NOTE (OTHER) - BACKGROUND
80 Y.O F admitted for difficulty walking r/t fall and fracture of pelvis and sacral. PMHx dialysis patient, HTN
80 Y.O F admitted for difficulty walking r/t fall and fracture of pelvis and sacral. PMHx dialysis patient, HTN
80 Y.O F admitted for difficulty walking related to fall with pelvis/sacral fracture. PMHx of HTN and Dialysis pt
Admitted s/p fall, CT scan shows acute sacral fx
Pt is admitted for Fall at home

## 2020-11-05 NOTE — PROVIDER CONTACT NOTE (OTHER) - ACTION/TREATMENT ORDERED:
None at this time.
Pt ordered Oxycodone 5mg IR, stated to give with Tylenol; provider to RN note in chart. Continue to monitor pain.
Noted
Noted; Recheck BP upon return to unit
Provider made aware. BP meds are given, will recheck in an hour.
Provider notified. Okay to reschedule, re attempt at later time.
Recheck BP manually in 15 mins

## 2020-11-05 NOTE — PROVIDER CONTACT NOTE (OTHER) - ASSESSMENT
AOx4. Calm and cooperative. No s/s of distress. Pt complains of pain located in right hip, Tylenol given. No other complaints. Diastolic pressure running low in 40's. Pt asymptomatic.
AA&Ox4
AOx4. Calm and cooperative. No s/s of distress. Pt complains of pain located in right hip, Tylenol given. No other complaints. Patient has BP of 180/60 manually
Pt is asymptomatic, Pt's BP is 162/68
Pt is asymptomatic; denies pain or dizziness
Pt is asymptomatic; denies pain or dizziness
Pt is asymptomatic; reports pain 10/10 on right hip
Pt is currently awake and alert x4. No s/s of distress. Pt complains of tolerable pain with hip. Minimal light red bleeding was noticed with bowel movement with soft brown moderate amount of stool.  service was used with ID#[id number] Frank to assess pt, pt stated that she noticed bleeding herself "a few days ago" when wiping and it is due to having a "sore bottom" after having an episode of diarrhea and constipation.

## 2020-11-05 NOTE — PROVIDER CONTACT NOTE (OTHER) - RECOMMENDATIONS
None at this time
BP meds given; will recheck upon return to unit from x-ray
Give BP meds and recheck BP
Oxycodone given for pain
Provider states that she will go and assess patient.
Reschedule medication for lunch time per pt request, notify provider
administer all BP medication as ordered.

## 2020-11-05 NOTE — PROGRESS NOTE ADULT - ASSESSMENT
81 y/o F with ESRD on HD (MWF), HTN, GERD presents with hip and back pain after a fall. GI consulted for constipation

## 2020-11-05 NOTE — PROGRESS NOTE ADULT - SUBJECTIVE AND OBJECTIVE BOX
[facility] [place] [facility]   MD BARBARA HENSON DO ANAM SIDDIQUI ANGELA WONG, PA    TEL:  OFFICE: [phone]  DR OSEGUERA CELL: [phone]  DR. MITCHELL CELL: [phone]  DR. MORENO CELL: [phone]  CHARU ROWLAND CELL: [phone]    From 5pm-7am Answering Service [phone]    -- RENAL FOLLOW UP NOTE ---Date of Service 11-05-20 @ 13:10    Patient is a 80y old  Female who presents with a chief complaint of Fall (05 Nov 2020 10:44)      Patient seen and examined at bedside. No chest pain/sob    VITALS:  T(F): 98.7 (11-05-20 @ 06:00), Max: 98.7 (11-05-20 @ 06:00)  HR: 81 (11-05-20 @ 08:00)  BP: 146/62 (11-05-20 @ 08:00)  RR: 17 (11-05-20 @ 06:00)  SpO2: 98% (11-05-20 @ 06:00)  Wt(kg): --    11-04 @ 07:01  -  11-05 @ 07:00  --------------------------------------------------------  IN: 500 mL / OUT: 2100 mL / NET: -1600 mL          PHYSICAL EXAM:  Constitutional: NAD  Neck: No JVD  Respiratory: CTAB, no wheezes, rales or rhonchi  Cardiovascular: S1, S2, RRR  Gastrointestinal: BS+, soft, NT/ND  Extremities: No peripheral edema    Hospital Medications:   MEDICATIONS  (STANDING):  amLODIPine   Tablet 10 milliGRAM(s) Oral daily  chlorhexidine 4% Liquid 1 Application(s) Topical daily  epoetin efren-epbx (RETACRIT) Injectable 58973 Unit(s) IV Push <User Schedule>  heparin   Injectable 5000 Unit(s) SubCutaneous three times a day  hydrALAZINE 25 milliGRAM(s) Oral three times a day  hydrocortisone hemorrhoidal Suppository 1 Suppository(s) Rectal at bedtime  labetalol 100 milliGRAM(s) Oral three times a day  lidocaine/prilocaine Cream 1 Application(s) Topical <User Schedule>  losartan 100 milliGRAM(s) Oral daily  multivitamin 1 Tablet(s) Oral daily  pantoprazole    Tablet 40 milliGRAM(s) Oral before breakfast  polyethylene glycol 3350 17 Gram(s) Oral two times a day  senna 2 Tablet(s) Oral at bedtime  sucralfate suspension 1 Gram(s) Oral four times a day      LABS:  11-04    134<L>  |  95<L>  |  60<H>  ----------------------------<  98  3.7   |  27  |  6.82<H>    Ca    9.4      04 Nov 2020 06:40  Phos  2.9     11-04  Mg     2.2     11-04      Creatinine Trend: 6.82 <--, 7.03 <--, 5.24 <--, 6.82 <--, 5.29 <--                                10.2   4.32  )-----------( 150      ( 04 Nov 2020 09:41 )             32.4     Urine Studies:  Urinalysis - [10-11-20 @ 09:04]      Color LIGHT YELLOW / Appearance CLEAR / SG 1.010 / pH 8.0      Gluc 50 / Ketone NEGATIVE  / Bili NEGATIVE / Urobili NORMAL       Blood NEGATIVE / Protein 300 / Leuk Est NEGATIVE / Nitrite NEGATIVE      RBC 0-2 / WBC 0-2 / Hyaline NEGATIVE / Gran  / Sq Epi OCC / Non Sq Epi  / Bacteria NEGATIVE      Iron 152, TIBC 211, %sat --      [10-10-20 @ 10:35]  Ferritin 2109      [10-11-20 @ 06:34]  TSH 2.22      [10-10-20 @ 10:35]    HBsAb 16.0      [10-31-20 @ 10:30]  HBsAg NEGATIVE      [10-10-20 @ 17:05]  HBcAb Nonreactive      [10-31-20 @ 10:30]  HCV 0.05, Nonreactive Hepatitis C AB  S/CO Ratio                        Interpretation  < 1.00                                   Non-Reactive  1.00 - 4.99                         Weakly-Reactive  >= 5.00                                Reactive  Non-Reactive: Aperson with a non-reactive HCV antibody  result is considered uninfected.  No further action is  needed unless recent infection is suspected.  In these  cases, consider repeat testing later to detect  seroconversion..  Weakly-Reactive: HCV antibody test is abnormal, HCV RNA  Qualitative test will follow.  Reactive: HCV antibody test is abnormal, HCV RNA  Qualitative test will follow.  Note: HCV antibody testing is performed on the [employer]   system.      [10-31-20 @ 10:30]      RADIOLOGY & ADDITIONAL STUDIES:

## 2020-11-05 NOTE — PROVIDER CONTACT NOTE (OTHER) - SITUATION
Pt diastolic low 147/41
Patient has BP of 180/60
Pt c/o sharp pain 10/10 to back and hips
Pt c/o sharp pain 10/10 to back and hips
Pt had minimal light red bleeding with bowel movement.
Pt is still hypertensive after given BP meds, /65 and additional ordered meds given
Pt is still hypertensive after given BP, /67 and additional ordered meds given
Pt returned from dialysis, Pt refuses to take blood pressure medication would prefer to take at a later time.

## 2020-11-05 NOTE — PROGRESS NOTE ADULT - PROBLEM SELECTOR PLAN 2
-nonobstructive  -lactulose x 1 today, 11/5  -miralax bid and senna qhs  -fleet enema prn  -high fiber diet

## 2020-11-05 NOTE — PROGRESS NOTE ADULT - SUBJECTIVE AND OBJECTIVE BOX
INTERVAL HPI/OVERNIGHT EVENTS:    c/o only small bm's and not productive  no abd pain or n/v  tolerating diet       MEDICATIONS  (STANDING):  amLODIPine   Tablet 10 milliGRAM(s) Oral daily  chlorhexidine 4% Liquid 1 Application(s) Topical daily  epoetin efren-epbx (RETACRIT) Injectable 34767 Unit(s) IV Push <User Schedule>  heparin   Injectable 5000 Unit(s) SubCutaneous three times a day  hydrALAZINE 25 milliGRAM(s) Oral three times a day  hydrocortisone hemorrhoidal Suppository 1 Suppository(s) Rectal at bedtime  labetalol 100 milliGRAM(s) Oral three times a day  lactulose Syrup 15 Gram(s) Oral once  lidocaine/prilocaine Cream 1 Application(s) Topical <User Schedule>  losartan 100 milliGRAM(s) Oral daily  multivitamin 1 Tablet(s) Oral daily  pantoprazole    Tablet 40 milliGRAM(s) Oral before breakfast  polyethylene glycol 3350 17 Gram(s) Oral two times a day  senna 2 Tablet(s) Oral at bedtime  sucralfate suspension 1 Gram(s) Oral four times a day    MEDICATIONS  (PRN):  acetaminophen   Tablet .. 650 milliGRAM(s) Oral every 6 hours PRN Temp greater or equal to 38C (100.4F), Moderate Pain (4 - 6), Severe Pain (7 - 10)  lactulose Syrup 15 Gram(s) Oral daily PRN constipation  oxyCODONE    IR 5 milliGRAM(s) Oral every 8 hours PRN Severe Pain (7 - 10)      Allergies    alcohol swabs (Unknown)  IV contrast (Urticaria)  No Known Drug Allergies    Intolerances        Review of Systems:    General:  No wt loss, fevers, chills, night sweats, fatigue   Eyes:  Good vision, no reported pain  ENT:  No sore throat, pain, runny nose, dysphagia  CV:  No pain, palpitations, hypo/hypertension  Resp:  No dyspnea, cough, tachypnea, wheezing  GI:  No pain, No nausea, No vomiting, No diarrhea, +mild constipation, No weight loss, No fever, No pruritis, No rectal bleeding, No melena, No dysphagia  :  No pain, bleeding, incontinence, nocturia  Muscle:  No pain, weakness  Neuro:  No weakness, tingling, memory problems  Psych:  No fatigue, insomnia, mood problems, depression  Endocrine:  No polyuria, polydypsia, cold/heat intolerance  Heme:  No petechiae, ecchymosis, easy bruisability  Skin:  No rash, tattoos, scars, edema      Vital Signs Last 24 Hrs  T(C): 37.1 (05 Nov 2020 06:00), Max: 37.1 (05 Nov 2020 06:00)  T(F): 98.7 (05 Nov 2020 06:00), Max: 98.7 (05 Nov 2020 06:00)  HR: 81 (05 Nov 2020 08:00) (69 - 81)  BP: 146/62 (05 Nov 2020 08:00) (146/62 - 180/60)  BP(mean): --  RR: 17 (05 Nov 2020 06:00) (17 - 18)  SpO2: 98% (05 Nov 2020 06:00) (97% - 98%)    PHYSICAL EXAM:    Constitutional: NAD  HEENT: EOMI, throat clear  Neck: No LAD, supple  Respiratory: CTA and P  Cardiovascular: S1 and S2, RRR, no M  Gastrointestinal: BS+, soft, NT/ND, neg HSM,  Extremities: No peripheral edema, neg clubbing, cyanosis  Vascular: 2+ peripheral pulses  Neurological: A/O x 3, no focal deficits  Psychiatric: Normal mood, normal affect  Skin: No rashes      LABS:                        10.2   4.32  )-----------( 150      ( 04 Nov 2020 09:41 )             32.4     11-04    134<L>  |  95<L>  |  60<H>  ----------------------------<  98  3.7   |  27  |  6.82<H>    Ca    9.4      04 Nov 2020 06:40  Phos  2.9     11-04  Mg     2.2     11-04            RADIOLOGY & ADDITIONAL TESTS:

## 2020-11-05 NOTE — PROGRESS NOTE ADULT - ASSESSMENT
79 yo female Romansh speaking but able to communicate with english w/ pmhx ESRD on dialysis (MWF) via AVF from [facility], HTN present with mechanical fall    ESRD on HD via AVF MWF  From [facility] unit  HD consent in the chart  HD 11/4 with 1. L UF,   HD per MWF schedule  pending rehab placement    HTN  Control is better.  - Continue home BP medications--losartan, labetalol, amlodipine hydralazine    Anemia  transfuse to keep hb>9 (aditya requirement)  epo ordered.   monitor    HIP PAIN.  Non-displaced Fracture.

## 2020-11-10 PROBLEM — Z00.00 ENCOUNTER FOR PREVENTIVE HEALTH EXAMINATION: Status: ACTIVE | Noted: 2020-11-10

## 2020-11-19 NOTE — PATIENT PROFILE ADULT - HAS THE PATIENT RECEIVED THE INFLUENZA VACCINE THIS SEASON?
yes... Post-Care Instructions: I reviewed with the patient in detail post-care instructions. Patient is not to engage in any heavy lifting, exercise, or swimming for the next 14 days. Should the patient develop any fevers, chills, bleeding, severe pain patient will contact the office immediately.

## 2020-12-04 ENCOUNTER — APPOINTMENT (OUTPATIENT)
Dept: ORTHOPEDIC SURGERY | Facility: CLINIC | Age: 80
End: 2020-12-04
Payer: MEDICARE

## 2020-12-04 VITALS — SYSTOLIC BLOOD PRESSURE: 173 MMHG | HEART RATE: 80 BPM | DIASTOLIC BLOOD PRESSURE: 66 MMHG

## 2020-12-04 VITALS — BODY MASS INDEX: 19.14 KG/M2 | HEIGHT: 62.99 IN

## 2020-12-04 VITALS — WEIGHT: 108 LBS

## 2020-12-04 DIAGNOSIS — S32.501A UNSPECIFIED FRACTURE OF RIGHT PUBIS, INITIAL ENCOUNTER FOR CLOSED FRACTURE: ICD-10-CM

## 2020-12-04 DIAGNOSIS — S32.10XA UNSPECIFIED FRACTURE OF SACRUM, INITIAL ENCOUNTER FOR CLOSED FRACTURE: ICD-10-CM

## 2020-12-04 DIAGNOSIS — S32.2XXA UNSPECIFIED FRACTURE OF SACRUM, INITIAL ENCOUNTER FOR CLOSED FRACTURE: ICD-10-CM

## 2020-12-04 PROCEDURE — 99204 OFFICE O/P NEW MOD 45 MIN: CPT

## 2020-12-04 PROCEDURE — 72190 X-RAY EXAM OF PELVIS: CPT

## 2020-12-04 PROCEDURE — 72220 X-RAY EXAM SACRUM TAILBONE: CPT

## 2020-12-04 PROCEDURE — 99072 ADDL SUPL MATRL&STAF TM PHE: CPT

## 2020-12-04 NOTE — HISTORY OF PRESENT ILLNESS
[de-identified] : 80 year old female with history of ESRD on HD, HTN presents today from [facility] for evaluation of her right hip. She fell one month ago sustaining fractures to the right pelvis and sacrum. She was discharged to rehab. She reports improvement in pain. She does have discomfort when sitting down onto a chair. She is doing PT daily. She feels she is able to walk but she has been NWB since her injury. She was walking unassisted prior to her injury.

## 2020-12-04 NOTE — REASON FOR VISIT
[Initial Visit] : an initial visit for [ Service] : provided by  Service [FreeTextEntry2] : right pelvic fracture

## 2020-12-04 NOTE — PHYSICAL EXAM
[Wheelchair] : uses a wheelchair [LE] : Sensory: Intact in bilateral lower extremities [Knee] : patellar 2+ and symmetric bilaterally [DP] : dorsalis pedis 2+ and symmetric bilaterally [Normal] : Alert and in no acute distress [Poor Appearance] : well-appearing [Acute Distress] : not in acute distress [Obese] : not obese [de-identified] : The patient has no respiratory distress. Mood and affect are normal. The patient is alert and oriented to person, place and time.\par There is no tenderness of the lumbar spine over the sacrum.  There is no pelvic tenderness.  There is no pain with active or passive motion of either hip.  There is no pain with knee range of motion.  The calves are soft and nontender.  The skin is intact.  There is no lymphedema. [de-identified] : \par EXAM: CT HIP ONLY RT\par \par EXAM: CT 3D RECONSTRUCT WO SYDNEE\par PROCEDURE DATE: Oct 28 2020\par INTERPRETATION: CLINICAL INDICATION: Right hip pain, stress fracture suspected, negative x-ray.\par \par TECHNIQUE: CT axial images of the right hip were obtained without intravenous contrast. Coronal and sagittal reformatted images were also obtained. 3-D volume rendering images were obtained from a separate workstation.\par \par COMPARISON: XR: 10/28/2020. CT: None. MR: None.\par \par FINDINGS:\par \par Bones: Osteopenia. Acute, minimally displaced fractures of the junction of the right suprapubic ramus/acetabulum and right inferior pubic ramus. Acute sacral fracture with mildly displaced fracture fragment anteriorly. No dislocation.\par \par Right hip osteoarthrosis. Degenerative changes of the visualized sacroiliac joint. Question transitional lumbosacral anatomy.\par \par Soft tissue: Prominent right obturator internus and possibly hip adductor muscles, with stranding in the extraperitoneal/perivesical space, which may represent edema/hematoma. Nonspecific stranding in the soft tissue adjacent to the right sacral fracture.\par \par Trace right hip joint effusion. Diffuse muscle bulk loss with fatty replacement. Right buttock injection granulomas.\par \par Additional: Colon diverticulosis. Prominent wall of the partially distended bladder. Atherosclerosis.\par \par IMPRESSION:\par \par Acute, minimally displaced fractures of the junction of the right suprapubic ramus/acetabulum and right inferior pubic ramus.\par Acute sacral fracture with mildly displaced fracture fragment anteriorly. MRI may be obtained if there is clinical suspicion for additional fracture.\par \par Prominent bladder wall, which may be due to partial distention. Recommend clinical correlation to assess urinary tract infection.\par \par Additional findings as described.\par \par EVA EDWARDS MD; Attending Radiologist\par This document has been electronically signed. Oct 29 2020 1:42AM\par \par \par AP and lateral x-rays of the sacrum demonstrate no fracture or dislocation.  X-rays of the pelvis demonstrate healed fracture of the superior and inferior pubic rami.

## 2020-12-04 NOTE — DISCUSSION/SUMMARY
[de-identified] : The patient has healed pelvic fractures.  She may bear weight to tolerance and resume all normal activities.  She will return as needed.

## 2020-12-31 ENCOUNTER — NON-APPOINTMENT (OUTPATIENT)
Age: 80
End: 2020-12-31

## 2021-03-26 NOTE — CHART NOTE - NSCHARTNOTESELECT_GEN_ALL_CORE
Epsom salt bath 10 minutes twice daily for the next few days, then every night  Call back if having fever,  I will call back with urine culture results   Event Note

## 2022-01-10 NOTE — ED PROVIDER NOTE - PMH
Dialysis patient    HTN (hypertension)     Griseofulvin Counseling:  I discussed with the patient the risks of griseofulvin including but not limited to photosensitivity, cytopenia, liver damage, nausea/vomiting and severe allergy.  The patient understands that this medication is best absorbed when taken with a fatty meal (e.g., ice cream or french fries).

## 2022-02-11 ENCOUNTER — TRANSCRIPTION ENCOUNTER (OUTPATIENT)
Age: 82
End: 2022-02-11

## 2022-02-11 ENCOUNTER — INPATIENT (INPATIENT)
Facility: HOSPITAL | Age: 82
LOS: 10 days | Discharge: NURSING, REHAB OR LONG-TERM CARE | DRG: 521 | End: 2022-02-22
Attending: ORTHOPAEDIC SURGERY | Admitting: ORTHOPAEDIC SURGERY
Payer: MEDICARE

## 2022-02-11 VITALS
SYSTOLIC BLOOD PRESSURE: 207 MMHG | HEIGHT: 63 IN | OXYGEN SATURATION: 99 % | HEART RATE: 74 BPM | TEMPERATURE: 98 F | WEIGHT: 102.07 LBS | DIASTOLIC BLOOD PRESSURE: 63 MMHG | RESPIRATION RATE: 20 BRPM

## 2022-02-11 DIAGNOSIS — S72.002A FRACTURE OF UNSPECIFIED PART OF NECK OF LEFT FEMUR, INITIAL ENCOUNTER FOR CLOSED FRACTURE: ICD-10-CM

## 2022-02-11 DIAGNOSIS — S72.009A FRACTURE OF UNSPECIFIED PART OF NECK OF UNSPECIFIED FEMUR, INITIAL ENCOUNTER FOR CLOSED FRACTURE: ICD-10-CM

## 2022-02-11 DIAGNOSIS — I10 ESSENTIAL (PRIMARY) HYPERTENSION: ICD-10-CM

## 2022-02-11 DIAGNOSIS — N18.6 END STAGE RENAL DISEASE: ICD-10-CM

## 2022-02-11 DIAGNOSIS — R52 PAIN, UNSPECIFIED: ICD-10-CM

## 2022-02-11 LAB
ALBUMIN SERPL ELPH-MCNC: 4.6 G/DL — SIGNIFICANT CHANGE UP (ref 3.3–5)
ALP SERPL-CCNC: 164 U/L — HIGH (ref 40–120)
ALT FLD-CCNC: 18 U/L — SIGNIFICANT CHANGE UP (ref 10–45)
ANION GAP SERPL CALC-SCNC: 15 MMOL/L — SIGNIFICANT CHANGE UP (ref 5–17)
ANION GAP SERPL CALC-SCNC: 16 MMOL/L — SIGNIFICANT CHANGE UP (ref 5–17)
APTT BLD: 40.7 SEC — HIGH (ref 27.5–35.5)
AST SERPL-CCNC: 65 U/L — HIGH (ref 10–40)
BASOPHILS # BLD AUTO: 0.04 K/UL — SIGNIFICANT CHANGE UP (ref 0–0.2)
BASOPHILS NFR BLD AUTO: 0.8 % — SIGNIFICANT CHANGE UP (ref 0–2)
BILIRUB SERPL-MCNC: 0.6 MG/DL — SIGNIFICANT CHANGE UP (ref 0.2–1.2)
BLD GP AB SCN SERPL QL: NEGATIVE — SIGNIFICANT CHANGE UP
BUN SERPL-MCNC: 45 MG/DL — HIGH (ref 7–23)
BUN SERPL-MCNC: 46 MG/DL — HIGH (ref 7–23)
CALCIUM SERPL-MCNC: 9.3 MG/DL — SIGNIFICANT CHANGE UP (ref 8.4–10.5)
CALCIUM SERPL-MCNC: 9.6 MG/DL — SIGNIFICANT CHANGE UP (ref 8.4–10.5)
CHLORIDE SERPL-SCNC: 95 MMOL/L — LOW (ref 96–108)
CHLORIDE SERPL-SCNC: 98 MMOL/L — SIGNIFICANT CHANGE UP (ref 96–108)
CO2 SERPL-SCNC: 24 MMOL/L — SIGNIFICANT CHANGE UP (ref 22–31)
CO2 SERPL-SCNC: 27 MMOL/L — SIGNIFICANT CHANGE UP (ref 22–31)
CREAT SERPL-MCNC: 6.26 MG/DL — HIGH (ref 0.5–1.3)
CREAT SERPL-MCNC: 6.66 MG/DL — HIGH (ref 0.5–1.3)
EOSINOPHIL # BLD AUTO: 0.02 K/UL — SIGNIFICANT CHANGE UP (ref 0–0.5)
EOSINOPHIL NFR BLD AUTO: 0.4 % — SIGNIFICANT CHANGE UP (ref 0–6)
GLUCOSE SERPL-MCNC: 103 MG/DL — HIGH (ref 70–99)
GLUCOSE SERPL-MCNC: 93 MG/DL — SIGNIFICANT CHANGE UP (ref 70–99)
HCT VFR BLD CALC: 45.3 % — HIGH (ref 34.5–45)
HGB BLD-MCNC: 14.1 G/DL — SIGNIFICANT CHANGE UP (ref 11.5–15.5)
IMM GRANULOCYTES NFR BLD AUTO: 0.2 % — SIGNIFICANT CHANGE UP (ref 0–1.5)
INR BLD: 0.91 RATIO — SIGNIFICANT CHANGE UP (ref 0.88–1.16)
LYMPHOCYTES # BLD AUTO: 0.51 K/UL — LOW (ref 1–3.3)
LYMPHOCYTES # BLD AUTO: 10.8 % — LOW (ref 13–44)
MCHC RBC-ENTMCNC: 29.1 PG — SIGNIFICANT CHANGE UP (ref 27–34)
MCHC RBC-ENTMCNC: 31.1 GM/DL — LOW (ref 32–36)
MCV RBC AUTO: 93.4 FL — SIGNIFICANT CHANGE UP (ref 80–100)
MONOCYTES # BLD AUTO: 0.24 K/UL — SIGNIFICANT CHANGE UP (ref 0–0.9)
MONOCYTES NFR BLD AUTO: 5.1 % — SIGNIFICANT CHANGE UP (ref 2–14)
NEUTROPHILS # BLD AUTO: 3.9 K/UL — SIGNIFICANT CHANGE UP (ref 1.8–7.4)
NEUTROPHILS NFR BLD AUTO: 82.7 % — HIGH (ref 43–77)
NRBC # BLD: 0 /100 WBCS — SIGNIFICANT CHANGE UP (ref 0–0)
PLATELET # BLD AUTO: 106 K/UL — LOW (ref 150–400)
POTASSIUM SERPL-MCNC: 4.9 MMOL/L — SIGNIFICANT CHANGE UP (ref 3.5–5.3)
POTASSIUM SERPL-MCNC: 6.7 MMOL/L — CRITICAL HIGH (ref 3.5–5.3)
POTASSIUM SERPL-SCNC: 4.9 MMOL/L — SIGNIFICANT CHANGE UP (ref 3.5–5.3)
POTASSIUM SERPL-SCNC: 6.7 MMOL/L — CRITICAL HIGH (ref 3.5–5.3)
PROT SERPL-MCNC: 6.7 G/DL — SIGNIFICANT CHANGE UP (ref 6–8.3)
PROTHROM AB SERPL-ACNC: 11 SEC — SIGNIFICANT CHANGE UP (ref 10.6–13.6)
RBC # BLD: 4.85 M/UL — SIGNIFICANT CHANGE UP (ref 3.8–5.2)
RBC # FLD: 17.2 % — HIGH (ref 10.3–14.5)
RH IG SCN BLD-IMP: POSITIVE — SIGNIFICANT CHANGE UP
SARS-COV-2 RNA SPEC QL NAA+PROBE: SIGNIFICANT CHANGE UP
SODIUM SERPL-SCNC: 135 MMOL/L — SIGNIFICANT CHANGE UP (ref 135–145)
SODIUM SERPL-SCNC: 140 MMOL/L — SIGNIFICANT CHANGE UP (ref 135–145)
WBC # BLD: 4.72 K/UL — SIGNIFICANT CHANGE UP (ref 3.8–10.5)
WBC # FLD AUTO: 4.72 K/UL — SIGNIFICANT CHANGE UP (ref 3.8–10.5)

## 2022-02-11 PROCEDURE — 99284 EMERGENCY DEPT VISIT MOD MDM: CPT

## 2022-02-11 PROCEDURE — 73552 X-RAY EXAM OF FEMUR 2/>: CPT | Mod: 26,LT

## 2022-02-11 PROCEDURE — 71045 X-RAY EXAM CHEST 1 VIEW: CPT | Mod: 26

## 2022-02-11 PROCEDURE — 73502 X-RAY EXAM HIP UNI 2-3 VIEWS: CPT | Mod: 26,LT

## 2022-02-11 RX ORDER — AMLODIPINE BESYLATE 2.5 MG/1
10 TABLET ORAL DAILY
Refills: 0 | Status: DISCONTINUED | OUTPATIENT
Start: 2022-02-11 | End: 2022-02-12

## 2022-02-11 RX ORDER — OXYCODONE HYDROCHLORIDE 5 MG/1
2.5 TABLET ORAL EVERY 4 HOURS
Refills: 0 | Status: DISCONTINUED | OUTPATIENT
Start: 2022-02-11 | End: 2022-02-12

## 2022-02-11 RX ORDER — FENTANYL CITRATE 50 UG/ML
25 INJECTION INTRAVENOUS ONCE
Refills: 0 | Status: DISCONTINUED | OUTPATIENT
Start: 2022-02-11 | End: 2022-02-11

## 2022-02-11 RX ORDER — HYDRALAZINE HCL 50 MG
25 TABLET ORAL
Refills: 0 | Status: DISCONTINUED | OUTPATIENT
Start: 2022-02-11 | End: 2022-02-12

## 2022-02-11 RX ORDER — ACETAMINOPHEN 500 MG
975 TABLET ORAL EVERY 8 HOURS
Refills: 0 | Status: DISCONTINUED | OUTPATIENT
Start: 2022-02-11 | End: 2022-02-12

## 2022-02-11 RX ORDER — LABETALOL HCL 100 MG
100 TABLET ORAL DAILY
Refills: 0 | Status: DISCONTINUED | OUTPATIENT
Start: 2022-02-11 | End: 2022-02-12

## 2022-02-11 RX ORDER — OXYCODONE HYDROCHLORIDE 5 MG/1
5 TABLET ORAL EVERY 4 HOURS
Refills: 0 | Status: DISCONTINUED | OUTPATIENT
Start: 2022-02-11 | End: 2022-02-12

## 2022-02-11 RX ORDER — SODIUM CHLORIDE 9 MG/ML
1000 INJECTION INTRAMUSCULAR; INTRAVENOUS; SUBCUTANEOUS
Refills: 0 | Status: DISCONTINUED | OUTPATIENT
Start: 2022-02-11 | End: 2022-02-12

## 2022-02-11 RX ADMIN — SODIUM CHLORIDE 50 MILLILITER(S): 9 INJECTION INTRAMUSCULAR; INTRAVENOUS; SUBCUTANEOUS at 15:51

## 2022-02-11 RX ADMIN — Medication 100 MILLIGRAM(S): at 15:44

## 2022-02-11 RX ADMIN — FENTANYL CITRATE 25 MICROGRAM(S): 50 INJECTION INTRAVENOUS at 11:24

## 2022-02-11 RX ADMIN — OXYCODONE HYDROCHLORIDE 2.5 MILLIGRAM(S): 5 TABLET ORAL at 22:41

## 2022-02-11 RX ADMIN — AMLODIPINE BESYLATE 10 MILLIGRAM(S): 2.5 TABLET ORAL at 15:44

## 2022-02-11 RX ADMIN — Medication 25 MILLIGRAM(S): at 23:55

## 2022-02-11 RX ADMIN — FENTANYL CITRATE 25 MICROGRAM(S): 50 INJECTION INTRAVENOUS at 14:00

## 2022-02-11 RX ADMIN — FENTANYL CITRATE 25 MICROGRAM(S): 50 INJECTION INTRAVENOUS at 15:01

## 2022-02-11 RX ADMIN — Medication 25 MILLIGRAM(S): at 15:43

## 2022-02-11 RX ADMIN — Medication 975 MILLIGRAM(S): at 16:18

## 2022-02-11 RX ADMIN — OXYCODONE HYDROCHLORIDE 5 MILLIGRAM(S): 5 TABLET ORAL at 19:05

## 2022-02-11 NOTE — ED ADULT NURSE REASSESSMENT NOTE - NS ED NURSE REASSESS COMMENT FT1
RN on hold from 1640 to 1651 to give report to [facility]. Will continue to monitor and assess patient while offering support and reassurance. Will attempt to call back to give report.

## 2022-02-11 NOTE — ED ADULT NURSE NOTE - NSIMPLEMENTINTERV_GEN_ALL_ED
Implemented All Fall with Harm Risk Interventions:  [place] to call system. Call bell, personal items and telephone within reach. Instruct patient to call for assistance. Room bathroom lighting operational. Non-slip footwear when patient is off stretcher. Physically safe environment: no spills, clutter or unnecessary equipment. Stretcher in lowest position, wheels locked, appropriate side rails in place. Provide visual cue, wrist band, yellow gown, etc. Monitor gait and stability. Monitor for mental status changes and reorient to person, place, and time. Review medications for side effects contributing to fall risk. Reinforce activity limits and safety measures with patient and family. Provide visual clues: red socks.

## 2022-02-11 NOTE — CONSULT NOTE ADULT - PROBLEM SELECTOR RECOMMENDATION 9
Patient is scheduled for an Open reduction and internal fixation of the left hip   No contraindication to scheduled procedure  NPO after midnight  DVT and GI prophylaxis

## 2022-02-11 NOTE — CONSULT NOTE ADULT - SUBJECTIVE AND OBJECTIVE BOX
80 yo female with PMH for ESRD on HD MWF, HTN presents with complaint of left hip pain and inability to walk sp falling backwards when getting up from stool at 12:30am today.  No head trauma, no neck pain.  Pain with ROM of left hip. [facility]. Patient was found to have a left femoral neck fracture. She is scheduled for an Open reduction and internal fixation of the left femur. Patient seen now resting comfortably     PAST MEDICAL & SURGICAL HISTORY:  End stage renal disease     HTN        MEDICATIONS  (STANDING):  acetaminophen     Tablet .. 975 milliGRAM(s) Oral every 8 hours  amLODIPine   Tablet 10 milliGRAM(s) Oral daily  hydrALAZINE 25 milliGRAM(s) Oral four times a day  labetalol 100 milliGRAM(s) Oral daily  sodium chloride 0.9%. 1000 milliLiter(s) (50 mL/Hr) IV Continuous <Continuous>    MEDICATIONS  (PRN):  oxyCODONE    IR 2.5 milliGRAM(s) Oral every 4 hours PRN Moderate Pain (4 - 6)  oxyCODONE    IR 5 milliGRAM(s) Oral every 4 hours PRN Severe Pain (7 - 10)    Social Hx:  Tobacco: neg  ETOH: Neg  Drugs: Neg    Family Hx:  As per my conversation with the patient, non contributory      ROS  CONSTITUTIONAL: No weakness, fevers or chills  EYES/ENT: No visual changes;  No vertigo or throat pain   NECK: No pain or stiffness  RESPIRATORY: No cough, wheezing, hemoptysis; No shortness of breath  CARDIOVASCULAR: No chest pain or palpitations  GASTROINTESTINAL: No abdominal or epigastric pain. No nausea, vomiting, or hematemesis; No diarrhea or constipation. No melena or hematochezia.  GENITOURINARY: No dysuria, frequency or hematuria  NEUROLOGICAL: No numbness or weakness  SKIN: No itching, burning, rashes, or lesions   MUSCULOSKELETAL: left leg pain    INTERVAL HPI/OVERNIGHT EVENTS:  T(C): 36.8 (02-11-22 @ 10:17), Max: 36.8 (02-11-22 @ 10:17)  HR: 70 (02-11-22 @ 12:21) (70 - 74)  BP: 188/63 (02-11-22 @ 12:21) (188/63 - 207/63)  RR: 20 (02-11-22 @ 12:21) (20 - 20)  SpO2: 92% (02-11-22 @ 12:21) (92% - 99%)  Wt(kg): --  I&O's Summary      PHYSICAL EXAM:  GENERAL: NAD, well-groomed, well-developed  HEAD:  Atraumatic, Normocephalic  EYES: EOMI, PERRLA, conjunctiva and sclera clear  ENMT: No tonsillar erythema, exudates, or enlargement; Moist mucous membranes, Good dentition, No lesions  NECK: Supple, No JVD, Normal thyroid  NERVOUS SYSTEM:  Alert & Oriented X3, Good concentration; Motor Strength 5/5 B/L upper and lower extremities; DTRs 2+ intact and symmetric  CHEST/LUNG: Clear to percussion bilaterally; No rales, rhonchi, wheezing, or rubs  HEART: Regular rate and rhythm; No murmurs, rubs, or gallops  ABDOMEN: Soft, Nontender, Nondistended; Bowel sounds present  EXTREMITIES:  2+ Peripheral Pulses, No clubbing, cyanosis, or edema  LYMPH: No lymphadenopathy noted  SKIN: No rashes or lesions        LABS:                        14.1   4.72  )-----------( 106      ( 11 Feb 2022 11:42 )             45.3     02-11    140  |  98  |  46<H>  ----------------------------<  103<H>  4.9   |  27  |  6.66<H>    Ca    9.3      11 Feb 2022 12:47    TPro  6.7  /  Alb  4.6  /  TBili  0.6  /  DBili  x   /  AST  65<H>  /  ALT  18  /  AlkPhos  164<H>  02-11    PT/INR - ( 11 Feb 2022 11:42 )   PT: 11.0 sec;   INR: 0.91 ratio         PTT - ( 11 Feb 2022 11:42 )  PTT:40.7 sec

## 2022-02-11 NOTE — ED PROVIDER NOTE - OBJECTIVE STATEMENT
Attending MD Arzate: 80 yo female with PMH for ESRD on HD MWF, HTN presents with complaint of left hip pain and inability to walk sp falling backwards when getting up from stool at 12:30am today.  No head trauma, no neck pain.  Pain with ROM of left hip

## 2022-02-11 NOTE — H&P ADULT - ATTENDING COMMENTS
Plan for left hip hemiarthroplasty. Risks of dislocation, leg length inequality and infection explained along with the consequences thereof. High risk of complications due to dialysis explained. All questions answered. Discussed with patient and daughter.

## 2022-02-11 NOTE — CONSULT NOTE ADULT - ASSESSMENT
82y/o female with history of HTN , ESRD presents with hip pain s/p fall, found to have hip fracture  Pending ortho eval    ESRD on HD ( MWF)  Outpt unit [facility]   Consent obtained for HD   Pt hip fracture --pending ORtho eval  Plan for HD pending OR eval   Monitor BMP and BP    Anemia  Does not tolerate Epogen/ aranesp  On MIcera 100mg Bi Montly  Last dose on 2/7   PRBC as needed to keep Hb >7.5    HTN  Bp stable  Resume outpt anti-hypertensives   Monitor BP    CKD -BMD  Check PTH  Monitor serum calcium and PO4 daily  82y/o female with history of HTN , ESRD presents with hip pain s/p fall, found to have hip fracture  Pending ortho eval    ESRD on HD ( MWF)  Outpt unit [facility]   Consent obtained for HD   Pt hip fracture --OR tomorrow  Plan for HD tonight to optimize for OR tomorrow   Monitor BMP and BP    Anemia  Does not tolerate Epogen/ aranesp  On MIcera 100mg Bi Montly  Last dose on 2/7   PRBC as needed to keep Hb >7.5    HTN  Bp stable  Resume outpt anti-hypertensives   Monitor BP    CKD -BMD  Check PTH  Monitor serum calcium and PO4 daily

## 2022-02-11 NOTE — ED PROVIDER NOTE - MUSCULOSKELETAL [+], MLM
Mary needing pregnancy confirmation letter to secure  spot. Letter emailed to her [employer] account.Pt indicated understanding and agreed with plan.     right hip/JOINT PAIN

## 2022-02-11 NOTE — CONSULT NOTE ADULT - PROBLEM SELECTOR RECOMMENDATION 2
continue amlodipine, Hydralazine, and labetalol  will continue to monitor BP and adjust meds as needed   Renal diet

## 2022-02-11 NOTE — H&P ADULT - HISTORY OF PRESENT ILLNESS
HPI  81y Female presents s/p [facility] fall c/o severe left hip pain and inability to ambulate.  Patient denies headstrike or LOC. Patient denies radiation of pain. Patient denies has mild numbness in LLE. No other bone/joint complaints. Patient is a community ambulator at baseline with assistive device    PAST MEDICAL & SURGICAL HISTORY:  ESRD with HD MWF    MEDICATIONS  (STANDING):  fentaNYL    Injectable 25 MICROGram(s) IV Push Once  sodium chloride 0.9%. 1000 milliLiter(s) (50 mL/Hr) IV Continuous <Continuous>    MEDICATIONS  (PRN):    Allergies    No Known Allergies    Intolerances                              14.1   4.72  )-----------( 106      ( 11 Feb 2022 11:42 )             45.3     02-11    140  |  98  |  46<H>  ----------------------------<  103<H>  4.9   |  27  |  6.66<H>    Ca    9.3      11 Feb 2022 12:47    TPro  6.7  /  Alb  4.6  /  TBili  0.6  /  DBili  x   /  AST  65<H>  /  ALT  18  /  AlkPhos  164<H>  02-11    PT/INR - ( 11 Feb 2022 11:42 )   PT: 11.0 sec;   INR: 0.91 ratio         PTT - ( 11 Feb 2022 11:42 )  PTT:40.7 sec    T(C): 36.8 (02-11-22 @ 10:17), Max: 36.8 (02-11-22 @ 10:17)  HR: 70 (02-11-22 @ 12:21) (70 - 74)  BP: 188/63 (02-11-22 @ 12:21) (188/63 - 207/63)  RR: 20 (02-11-22 @ 12:21) (20 - 20)  SpO2: 92% (02-11-22 @ 12:21) (92% - 99%)  Wt(kg): --    Physical Exam  Gen: NAD  Resp: Non-labored breathing  LLE:  + TTP about hip. No TTP to knee/leg/ankle/foot   ROM limited 2/2 pain   Unable to SLR; + Log Roll/Heel Strike  Motor intact GS/TA/FHL/EHL  SILT L2-S1  Compartments soft  Warm    RLE/BUE:   No bony TTP; Good ROM w/o pain; Exam Unremarkable    Imaging:  XR demonstrating Left femoral neck  fracture  < from: Xray Hip w/ Pelvis 2 or 3 Views, Left (02.11.22 @ 11:45) >  INTERPRETATION:  EXAMINATION: XR HIP WITH PELVIS 2 OR 3 VIEWS LEFT, XR   FEMUR 2 VIEWS LEFT    CLINICAL INDICATION:Trauma    COMPARISON: None    TECHNIQUE: AP pelvis, AP and crosstable lateral radiographs of the left   hip, and AP and lateral radiographs of the left femur were obtained.    INTERPRETATION: There is a displacedcomplete fracture across the left   femoral neck with lateral and proximal displacement of the distal   fracture segment by one half shaft width. There is impaction at the site   of fracture. No other acute fracture is identified.    There is osteopenia. There is vascular atherosclerosis.    IMPRESSION: Acute left femoral neck fracture.      < end of copied text >

## 2022-02-11 NOTE — H&P ADULT - ASSESSMENT
Patient is a 81y y/o Female who presented with  hip pain found to have Left femoral neck fracture. Patient is currently hemodynamically stable. Electrolytes wnl, though scheduled for HD on MWF    -Admit to ortho: Dr. Cabello  - Plan for OR for Left hip Hemiarthroplasty Tomorrow  - Patient consented in [place]ian  - Needs Medical clearance (Dr. Le aware)  - Ok for HD today  - Multimodal Pain control  - Bed Rest  - NPO/IVF after MN  - Pink catheter  - CBC/BMP/Coags/UA/T+S x2 in AM  - EKG/CXR ordered        Maria Fernanda Yang MD  General/Orthopaedic Surgery Resident PGY-1  [facility] [id number]  [facility]        [id number]  [facility]  [id number]/ [phone]

## 2022-02-11 NOTE — ED PROVIDER NOTE - CLINICAL SUMMARY MEDICAL DECISION MAKING FREE TEXT BOX
Attending MD Arzate: 82 yo female with PMH for ESRD on HD MWF, HTN presents with complaint of left hip pain and inability to walk sp falling backwards when getting up from stool at 12:30am today.  No head trauma, no neck pain.  Pain with ROM of left hip.  Plan; labs, Xrays, pain control and ortho consult.  Likely admission.

## 2022-02-11 NOTE — CONSULT NOTE ADULT - SUBJECTIVE AND OBJECTIVE BOX
Cardiovascular Disease Initial Evaluation    CHIEF COMPLAINT: Mechanical fall    HISTORY OF PRESENT ILLNESS: Ms. Spear is an 81y Female with pmhx of HTN, ESRD on HD, anemia of chronic disease who presents s/p [facility] fall c/o severe left hip pain and inability to ambulate.  Patient denies headstrike or LOC. Patient denies radiation of pain. Patient denies has mild numbness in LLE. No other bone/joint complaints. Patient is a community ambulator at baseline with assistive device. Imaging reveals fracture of L femoral neck. Currently awaiting ortho input. Pt denies chest pain, SOB, palpitations, or syncope.       Allergies    No Known Allergies    Intolerances    	    MEDICATIONS:  amLODIPine   Tablet 10 milliGRAM(s) Oral daily  hydrALAZINE 25 milliGRAM(s) Oral four times a day  labetalol 100 milliGRAM(s) Oral daily              sodium chloride 0.9%. 1000 milliLiter(s) IV Continuous <Continuous>      PAST MEDICAL & SURGICAL HISTORY:      FAMILY HISTORY:      SOCIAL HISTORY:    The patient is a nonsmoker       REVIEW OF SYSTEMS:  See HPI, otherwise complete 14 point review of systems negative    [x ] All others negative	  [ ] Unable to obtain    PHYSICAL EXAM:  T(C): 36.8 (02-11-22 @ 10:17), Max: 36.8 (02-11-22 @ 10:17)  HR: 70 (02-11-22 @ 12:21) (70 - 74)  BP: 188/63 (02-11-22 @ 12:21) (188/63 - 207/63)  RR: 20 (02-11-22 @ 12:21) (20 - 20)  SpO2: 92% (02-11-22 @ 12:21) (92% - 99%)  Wt(kg): --  I&O's Summary      Appearance: No Acute Distress; resting comfortably  HEENT:  Normal oral mucosa, PERRL, EOMI	  Cardiovascular: Normal S1 S2, No JVD, No murmurs/rubs/gallops  Respiratory: Normal respiratory effort; Lungs clear to auscultation bilaterally  Gastrointestinal:  Soft, Non-tender, + BS	  Skin: No rashes, No ecchymoses, No cyanosis	  Neurologic: Non-focal; no weakness  Extremities: No clubbing, cyanosis or edema  Vascular: Peripheral pulses palpable 2+ bilaterally  Psychiatry: A & O x 3, Mood & affect appropriate    Laboratory Data:	 	    CBC Full  -  ( 11 Feb 2022 11:42 )  WBC Count : 4.72 K/uL  Hemoglobin : 14.1 g/dL  Hematocrit : 45.3 %  Platelet Count - Automated : 106 K/uL  Mean Cell Volume : 93.4 fl  Mean Cell Hemoglobin : 29.1 pg  Mean Cell Hemoglobin Concentration : 31.1 gm/dL  Auto Neutrophil # : 3.90 K/uL  Auto Lymphocyte # : 0.51 K/uL  Auto Monocyte # : 0.24 K/uL  Auto Eosinophil # : 0.02 K/uL  Auto Basophil # : 0.04 K/uL  Auto Neutrophil % : 82.7 %  Auto Lymphocyte % : 10.8 %  Auto Monocyte % : 5.1 %  Auto Eosinophil % : 0.4 %  Auto Basophil % : 0.8 %    02-11    140  |  98  |  46<H>  ----------------------------<  103<H>  4.9   |  27  |  6.66<H>  02-11    135  |  95<L>  |  45<H>  ----------------------------<  93  6.7<HH>   |  24  |  6.26<H>    Ca    9.3      11 Feb 2022 12:47  Ca    9.6      11 Feb 2022 11:42    TPro  6.7  /  Alb  4.6  /  TBili  0.6  /  DBili  x   /  AST  65<H>  /  ALT  18  /  AlkPhos  164<H>  02-11      proBNP:   Lipid Profile:   HgA1c:   TSH:       CARDIAC MARKERS:            Interpretation of Telemetry: 	    ECG:  	  RADIOLOGY:  OTHER: 	    PREVIOUS DIAGNOSTIC TESTING:    [ ] Echocardiogram:  [ ] Catheterization:  [ ] Stress Test:  	    Assessment: 81y Female with pmhx of HTN, ESRD on HD, anemia of chronic disease who presents s/p [facility] fall c/o severe left hip pain and inability to ambulate.    Plan of Care:     #L femoral neck fracture  - S/p mechanical fall  - Pain control  - Awaiting ortho input at this time  - Pt shows no evidence of pre-operative coronary ischemia  - If surgery is needed: RCRI score 1 indicating low risk. Pt may proceed from cardiac standpoint.   - Obtain TTE for further evaluation     #HTN  - Uncontrolled  - Resume BB, Hydralazine and Norvasc    #ESRD  - HD as per nephro    #ACP (advance care planning)-  Advanced care planning was addressed. Pt awaiting ortho consult. From a cardiac standpoint, pt may proceed with surgery. TTE to be obtained however this should not delay surgery.  Risks, benefits and alternatives of medical treatment and procedures were discussed in detail and all questions were answered. 30 minutes spent addressing advance care plans.        72 minutes spent on total encounter; more than 50% of the visit was spent counseling and/or coordinating care by the attending physician.   	  Adrian Denise D.O.   Cardiovascular Diseases  [phone]     Cardiovascular Disease Initial Evaluation    CHIEF COMPLAINT: Mechanical fall    HISTORY OF PRESENT ILLNESS: Ms. Spear is an 81y Female with pmhx of HTN, ESRD on HD, anemia of chronic disease who presents s/p [facility] fall c/o severe left hip pain and inability to ambulate.  Patient denies headstrike or LOC. Patient denies radiation of pain. Patient denies has mild numbness in LLE. No other bone/joint complaints. Patient is a community ambulator at baseline with assistive device. Imaging reveals fracture of L femoral neck. Currently awaiting ortho input. Pt denies chest pain, SOB, palpitations, or syncope.       Allergies    No Known Allergies    Intolerances    	    MEDICATIONS:  amLODIPine   Tablet 10 milliGRAM(s) Oral daily  hydrALAZINE 25 milliGRAM(s) Oral four times a day  labetalol 100 milliGRAM(s) Oral daily              sodium chloride 0.9%. 1000 milliLiter(s) IV Continuous <Continuous>      PAST MEDICAL & SURGICAL HISTORY:      FAMILY HISTORY:      SOCIAL HISTORY:    The patient is a nonsmoker       REVIEW OF SYSTEMS:  See HPI, otherwise complete 14 point review of systems negative    [x ] All others negative	  [ ] Unable to obtain    PHYSICAL EXAM:  T(C): 36.8 (02-11-22 @ 10:17), Max: 36.8 (02-11-22 @ 10:17)  HR: 70 (02-11-22 @ 12:21) (70 - 74)  BP: 188/63 (02-11-22 @ 12:21) (188/63 - 207/63)  RR: 20 (02-11-22 @ 12:21) (20 - 20)  SpO2: 92% (02-11-22 @ 12:21) (92% - 99%)  Wt(kg): --  I&O's Summary      Appearance: No Acute Distress; resting comfortably  HEENT:  Normal oral mucosa, PERRL, EOMI	  Cardiovascular: Normal S1 S2, No JVD, No murmurs/rubs/gallops  Respiratory: Normal respiratory effort; Lungs clear to auscultation bilaterally  Gastrointestinal:  Soft, Non-tender, + BS	  Skin: No rashes, No ecchymoses, No cyanosis	  Neurologic: Non-focal; no weakness  Extremities: No clubbing, cyanosis or edema  Vascular: Peripheral pulses palpable 2+ bilaterally  Psychiatry: A & O x 3, Mood & affect appropriate    Laboratory Data:	 	    CBC Full  -  ( 11 Feb 2022 11:42 )  WBC Count : 4.72 K/uL  Hemoglobin : 14.1 g/dL  Hematocrit : 45.3 %  Platelet Count - Automated : 106 K/uL  Mean Cell Volume : 93.4 fl  Mean Cell Hemoglobin : 29.1 pg  Mean Cell Hemoglobin Concentration : 31.1 gm/dL  Auto Neutrophil # : 3.90 K/uL  Auto Lymphocyte # : 0.51 K/uL  Auto Monocyte # : 0.24 K/uL  Auto Eosinophil # : 0.02 K/uL  Auto Basophil # : 0.04 K/uL  Auto Neutrophil % : 82.7 %  Auto Lymphocyte % : 10.8 %  Auto Monocyte % : 5.1 %  Auto Eosinophil % : 0.4 %  Auto Basophil % : 0.8 %    02-11    140  |  98  |  46<H>  ----------------------------<  103<H>  4.9   |  27  |  6.66<H>  02-11    135  |  95<L>  |  45<H>  ----------------------------<  93  6.7<HH>   |  24  |  6.26<H>    Ca    9.3      11 Feb 2022 12:47  Ca    9.6      11 Feb 2022 11:42    TPro  6.7  /  Alb  4.6  /  TBili  0.6  /  DBili  x   /  AST  65<H>  /  ALT  18  /  AlkPhos  164<H>  02-11      proBNP:   Lipid Profile:   HgA1c:   TSH:       CARDIAC MARKERS:            Interpretation of Telemetry: 	    ECG: Sinus rhythm with 1st degree av block  RADIOLOGY:  OTHER: 	    PREVIOUS DIAGNOSTIC TESTING:    [ ] Echocardiogram:  [ ] Catheterization:  [ ] Stress Test:  	    Assessment: 81y Female with pmhx of HTN, ESRD on HD, anemia of chronic disease who presents s/p [facility] fall c/o severe left hip pain and inability to ambulate.    Plan of Care:     #L femoral neck fracture  - S/p mechanical fall  - Pain control  - EKG shows sinus with 1st degree block. No acute ischemic changes  - Awaiting ortho input at this time  - Pt shows no evidence of pre-operative coronary ischemia  - If surgery is needed: RCRI score 1 indicating low risk. Pt may proceed from cardiac standpoint.   - Obtain TTE for further evaluation     #HTN  - Uncontrolled  - Elevated 2/2 pain  - Resume BB, Hydralazine and Norvasc    #ESRD  - HD as per nephro    #ACP (advance care planning)-  Advanced care planning was addressed. Pt awaiting ortho consult. From a cardiac standpoint, pt may proceed with surgery. TTE to be obtained however this should not delay surgery.  Risks, benefits and alternatives of medical treatment and procedures were discussed in detail and all questions were answered. 30 minutes spent addressing advance care plans.        72 minutes spent on total encounter; more than 50% of the visit was spent counseling and/or coordinating care by the attending physician.   	  Adrian Denise D.O.   Cardiovascular Diseases  [phone]

## 2022-02-11 NOTE — ED ADULT NURSE NOTE - OBJECTIVE STATEMENT
81F aaox4 ambulatory with h/o HTN and ESRD came by EMS from home activated by daughter with c/o left hip pain s/p fall around 1230am last night. As per EMS, patient was sitting in a stool and when patient got up patient fell backwards witnessed by Daughter who helped her get to bed, patient slept but when woke up this morning unable to get up and c/o left hip pain, was given fentanyl by EMS total of 60mcg IVP, gray 22gauge in left hand by ems, RT arm fistula noted for dialysis.

## 2022-02-11 NOTE — CONSULT NOTE ADULT - SUBJECTIVE AND OBJECTIVE BOX
[facility]   MD MODESTO HENSON MD RUORU WONG, PA    TEL:  OFFICE: [phone]  DR OSEGUERA CELL: [phone]  TOÑA OSCAR CELL: [phone]  DR. MORENO CELL: [phone]      FROM 5 PM- 7 AM PLEASE CALL ANSWERING SERVICE AT [phone]    -- INITIAL RENAL CONSULT NOTE --- Date Of service 02-11-22 @ 11:54  --------------------------------------------------------------------------------  HPI:  80y/o female with history of HTN , ESRD presents with hip pain s/p fall, found to have hip fracture  Pending ortho eval      PAST HISTORY  --------------------------------------------------------------------------------  PAST MEDICAL & SURGICAL HISTORY:    FAMILY HISTORY:    PAST SOCIAL HISTORY:    ALLERGIES & MEDICATIONS  --------------------------------------------------------------------------------  Allergies    No Known Allergies    Intolerances      Standing Inpatient Medications    PRN Inpatient Medications      REVIEW OF SYSTEMS  --------------------------------------------------------------------------------  Gen: No fevers/chills  Skin: No rashes  Head/Eyes/Ears: Normal hearing,  Normal vision   Respiratory: No dyspnea, cough  CV: No chest pain  GI: No abdominal pain, diarrhea, constipation, nausea, vomiting  : No dysuria, hematuria  MSK: No  edema, + hip pain   Heme: No easy bruising or bleeding  Psych: No significant depression    All other systems were reviewed and are negative, except as noted.    VITALS/PHYSICAL EXAM  --------------------------------------------------------------------------------  T(C): 36.8 (02-11-22 @ 10:17), Max: 36.8 (02-11-22 @ 10:17)  HR: 74 (02-11-22 @ 10:17) (74 - 74)  BP: 207/63 (02-11-22 @ 10:17) (207/63 - 207/63)  RR: 20 (02-11-22 @ 10:17) (20 - 20)  SpO2: 99% (02-11-22 @ 10:17) (99% - 99%)  Wt(kg): --  Height (cm): 160 (02-11-22 @ 10:17)  Weight (kg): 46.3 (02-11-22 @ 10:17)  BMI (kg/m2): 18.1 (02-11-22 @ 10:17)  BSA (m2): 1.45 (02-11-22 @ 10:17)      Physical Exam:  	Gen: NAD  	HEENT: MMM  	Pulm: CTA B/L  	CV: S1S2  	Abd: Soft, +BS   	Ext: No LE edema B/L  	Neuro: Awake, alert  	Skin: Warm and dry  	Vascular access: avf          :  no bonita  LABS/STUDIES  --------------------------------------------------------------------------------                Creatinine Trend:

## 2022-02-11 NOTE — CONSULT NOTE ADULT - ASSESSMENT
82 yo woman with a hx of HTN and ESRD presents after a fall with a left hip fracture. Patient is scheduled for an Open reduction and internal fixation of the left hip

## 2022-02-11 NOTE — ED ADULT TRIAGE NOTE - CHIEF COMPLAINT QUOTE
left hip pain s/p fall at midnight left hip pain s/p fall at midnight  EMS reports administering 60mcg of Fentanyl prior to ED arrival

## 2022-02-11 NOTE — ED PROVIDER NOTE - PHYSICAL EXAMINATION
Attending MD Arzate: A & O x 3, GCS 15, NAD, Head NCAT and no facial asymmetry; C-spine with no midline tenderness to palpation and full range of motion; lungs CTAB with no chest wall trauma or TTP, heart with reg rhythm without murmur; abdomen soft NTND with no R/G; extremities with severe pain with ROM of left hip; skin with no rashes, neuro exam non focal with no motor or sensory deficits and patient is moving all extremities spontaneously.

## 2022-02-12 ENCOUNTER — RESULT REVIEW (OUTPATIENT)
Age: 82
End: 2022-02-12

## 2022-02-12 LAB
ALBUMIN SERPL ELPH-MCNC: 4.3 G/DL — SIGNIFICANT CHANGE UP (ref 3.3–5)
ALP SERPL-CCNC: 169 U/L — HIGH (ref 40–120)
ALT FLD-CCNC: 10 U/L — SIGNIFICANT CHANGE UP (ref 10–45)
ANION GAP SERPL CALC-SCNC: 16 MMOL/L — SIGNIFICANT CHANGE UP (ref 5–17)
ANION GAP SERPL CALC-SCNC: 16 MMOL/L — SIGNIFICANT CHANGE UP (ref 5–17)
APTT BLD: 36.2 SEC — HIGH (ref 27.5–35.5)
AST SERPL-CCNC: 15 U/L — SIGNIFICANT CHANGE UP (ref 10–40)
BILIRUB SERPL-MCNC: 0.7 MG/DL — SIGNIFICANT CHANGE UP (ref 0.2–1.2)
BUN SERPL-MCNC: 26 MG/DL — HIGH (ref 7–23)
BUN SERPL-MCNC: 29 MG/DL — HIGH (ref 7–23)
CALCIUM SERPL-MCNC: 8.7 MG/DL — SIGNIFICANT CHANGE UP (ref 8.4–10.5)
CALCIUM SERPL-MCNC: 9.8 MG/DL — SIGNIFICANT CHANGE UP (ref 8.4–10.5)
CHLORIDE SERPL-SCNC: 95 MMOL/L — LOW (ref 96–108)
CHLORIDE SERPL-SCNC: 99 MMOL/L — SIGNIFICANT CHANGE UP (ref 96–108)
CO2 SERPL-SCNC: 23 MMOL/L — SIGNIFICANT CHANGE UP (ref 22–31)
CO2 SERPL-SCNC: 26 MMOL/L — SIGNIFICANT CHANGE UP (ref 22–31)
CREAT SERPL-MCNC: 4.53 MG/DL — HIGH (ref 0.5–1.3)
CREAT SERPL-MCNC: 4.78 MG/DL — HIGH (ref 0.5–1.3)
GLUCOSE SERPL-MCNC: 83 MG/DL — SIGNIFICANT CHANGE UP (ref 70–99)
GLUCOSE SERPL-MCNC: 94 MG/DL — SIGNIFICANT CHANGE UP (ref 70–99)
HCT VFR BLD CALC: 42 % — SIGNIFICANT CHANGE UP (ref 34.5–45)
HCT VFR BLD CALC: 45 % — SIGNIFICANT CHANGE UP (ref 34.5–45)
HGB BLD-MCNC: 12.7 G/DL — SIGNIFICANT CHANGE UP (ref 11.5–15.5)
HGB BLD-MCNC: 13.9 G/DL — SIGNIFICANT CHANGE UP (ref 11.5–15.5)
INR BLD: 0.91 RATIO — SIGNIFICANT CHANGE UP (ref 0.88–1.16)
MCHC RBC-ENTMCNC: 28.5 PG — SIGNIFICANT CHANGE UP (ref 27–34)
MCHC RBC-ENTMCNC: 29 PG — SIGNIFICANT CHANGE UP (ref 27–34)
MCHC RBC-ENTMCNC: 30.2 GM/DL — LOW (ref 32–36)
MCHC RBC-ENTMCNC: 30.9 GM/DL — LOW (ref 32–36)
MCV RBC AUTO: 92.2 FL — SIGNIFICANT CHANGE UP (ref 80–100)
MCV RBC AUTO: 95.9 FL — SIGNIFICANT CHANGE UP (ref 80–100)
NRBC # BLD: 0 /100 WBCS — SIGNIFICANT CHANGE UP (ref 0–0)
NRBC # BLD: 0 /100 WBCS — SIGNIFICANT CHANGE UP (ref 0–0)
PLATELET # BLD AUTO: 88 K/UL — LOW (ref 150–400)
PLATELET # BLD AUTO: 90 K/UL — LOW (ref 150–400)
POTASSIUM SERPL-MCNC: 4.4 MMOL/L — SIGNIFICANT CHANGE UP (ref 3.5–5.3)
POTASSIUM SERPL-MCNC: 4.7 MMOL/L — SIGNIFICANT CHANGE UP (ref 3.5–5.3)
POTASSIUM SERPL-SCNC: 4.4 MMOL/L — SIGNIFICANT CHANGE UP (ref 3.5–5.3)
POTASSIUM SERPL-SCNC: 4.7 MMOL/L — SIGNIFICANT CHANGE UP (ref 3.5–5.3)
PROT SERPL-MCNC: 6.5 G/DL — SIGNIFICANT CHANGE UP (ref 6–8.3)
PROTHROM AB SERPL-ACNC: 11 SEC — SIGNIFICANT CHANGE UP (ref 10.6–13.6)
RBC # BLD: 4.38 M/UL — SIGNIFICANT CHANGE UP (ref 3.8–5.2)
RBC # BLD: 4.88 M/UL — SIGNIFICANT CHANGE UP (ref 3.8–5.2)
RBC # FLD: 17.1 % — HIGH (ref 10.3–14.5)
RBC # FLD: 17.1 % — HIGH (ref 10.3–14.5)
SODIUM SERPL-SCNC: 137 MMOL/L — SIGNIFICANT CHANGE UP (ref 135–145)
SODIUM SERPL-SCNC: 138 MMOL/L — SIGNIFICANT CHANGE UP (ref 135–145)
WBC # BLD: 4.75 K/UL — SIGNIFICANT CHANGE UP (ref 3.8–10.5)
WBC # BLD: 5.01 K/UL — SIGNIFICANT CHANGE UP (ref 3.8–10.5)
WBC # FLD AUTO: 4.75 K/UL — SIGNIFICANT CHANGE UP (ref 3.8–10.5)
WBC # FLD AUTO: 5.01 K/UL — SIGNIFICANT CHANGE UP (ref 3.8–10.5)

## 2022-02-12 PROCEDURE — 88305 TISSUE EXAM BY PATHOLOGIST: CPT | Mod: 26

## 2022-02-12 PROCEDURE — 88311 DECALCIFY TISSUE: CPT | Mod: 26

## 2022-02-12 PROCEDURE — 93010 ELECTROCARDIOGRAM REPORT: CPT

## 2022-02-12 PROCEDURE — 72170 X-RAY EXAM OF PELVIS: CPT | Mod: 26

## 2022-02-12 DEVICE — IMPLANTABLE DEVICE: Type: IMPLANTABLE DEVICE | Site: LEFT HIP | Status: FUNCTIONAL

## 2022-02-12 DEVICE — RESTRIC CEM BUCK 25MM: Type: IMPLANTABLE DEVICE | Site: LEFT HIP | Status: FUNCTIONAL

## 2022-02-12 DEVICE — INSERT TAPR ENDOPROS II: Type: IMPLANTABLE DEVICE | Site: LEFT HIP | Status: FUNCTIONAL

## 2022-02-12 DEVICE — CEMENT SIMPLEX WITH TOBRAMYCIN: Type: IMPLANTABLE DEVICE | Site: LEFT HIP | Status: FUNCTIONAL

## 2022-02-12 DEVICE — KIT A-LINE 1LUM 20G X 12CM SAFE KIT: Type: IMPLANTABLE DEVICE | Site: LEFT HIP | Status: FUNCTIONAL

## 2022-02-12 DEVICE — STEM FEM ECHO FX SZ 11 140MM: Type: IMPLANTABLE DEVICE | Site: LEFT HIP | Status: FUNCTIONAL

## 2022-02-12 RX ORDER — SENNA PLUS 8.6 MG/1
2 TABLET ORAL AT BEDTIME
Refills: 0 | Status: DISCONTINUED | OUTPATIENT
Start: 2022-02-12 | End: 2022-02-22

## 2022-02-12 RX ORDER — ONDANSETRON 8 MG/1
4 TABLET, FILM COATED ORAL ONCE
Refills: 0 | Status: DISCONTINUED | OUTPATIENT
Start: 2022-02-12 | End: 2022-02-12

## 2022-02-12 RX ORDER — SUCRALFATE 1 G
1 TABLET ORAL
Refills: 0 | Status: DISCONTINUED | OUTPATIENT
Start: 2022-02-12 | End: 2022-02-13

## 2022-02-12 RX ORDER — AMLODIPINE BESYLATE 2.5 MG/1
10 TABLET ORAL DAILY
Refills: 0 | Status: DISCONTINUED | OUTPATIENT
Start: 2022-02-12 | End: 2022-02-22

## 2022-02-12 RX ORDER — CEFAZOLIN SODIUM 1 G
2000 VIAL (EA) INJECTION EVERY 8 HOURS
Refills: 0 | Status: COMPLETED | OUTPATIENT
Start: 2022-02-12 | End: 2022-02-12

## 2022-02-12 RX ORDER — PANTOPRAZOLE SODIUM 20 MG/1
40 TABLET, DELAYED RELEASE ORAL
Refills: 0 | Status: DISCONTINUED | OUTPATIENT
Start: 2022-02-12 | End: 2022-02-13

## 2022-02-12 RX ORDER — ACETAMINOPHEN 500 MG
650 TABLET ORAL EVERY 6 HOURS
Refills: 0 | Status: COMPLETED | OUTPATIENT
Start: 2022-02-12 | End: 2022-02-15

## 2022-02-12 RX ORDER — LANOLIN ALCOHOL/MO/W.PET/CERES
3 CREAM (GRAM) TOPICAL AT BEDTIME
Refills: 0 | Status: DISCONTINUED | OUTPATIENT
Start: 2022-02-12 | End: 2022-02-22

## 2022-02-12 RX ORDER — ONDANSETRON 8 MG/1
4 TABLET, FILM COATED ORAL EVERY 6 HOURS
Refills: 0 | Status: DISCONTINUED | OUTPATIENT
Start: 2022-02-12 | End: 2022-02-22

## 2022-02-12 RX ORDER — ENOXAPARIN SODIUM 100 MG/ML
30 INJECTION SUBCUTANEOUS DAILY
Refills: 0 | Status: DISCONTINUED | OUTPATIENT
Start: 2022-02-13 | End: 2022-02-15

## 2022-02-12 RX ORDER — SODIUM CHLORIDE 9 MG/ML
1000 INJECTION, SOLUTION INTRAVENOUS
Refills: 0 | Status: DISCONTINUED | OUTPATIENT
Start: 2022-02-12 | End: 2022-02-12

## 2022-02-12 RX ORDER — SODIUM CHLORIDE 9 MG/ML
500 INJECTION, SOLUTION INTRAVENOUS ONCE
Refills: 0 | Status: COMPLETED | OUTPATIENT
Start: 2022-02-12 | End: 2022-02-12

## 2022-02-12 RX ORDER — OXYCODONE HYDROCHLORIDE 5 MG/1
5 TABLET ORAL EVERY 4 HOURS
Refills: 0 | Status: DISCONTINUED | OUTPATIENT
Start: 2022-02-12 | End: 2022-02-17

## 2022-02-12 RX ORDER — HYDROMORPHONE HYDROCHLORIDE 2 MG/ML
0.25 INJECTION INTRAMUSCULAR; INTRAVENOUS; SUBCUTANEOUS
Refills: 0 | Status: DISCONTINUED | OUTPATIENT
Start: 2022-02-12 | End: 2022-02-12

## 2022-02-12 RX ORDER — POLYETHYLENE GLYCOL 3350 17 G/17G
17 POWDER, FOR SOLUTION ORAL AT BEDTIME
Refills: 0 | Status: DISCONTINUED | OUTPATIENT
Start: 2022-02-12 | End: 2022-02-22

## 2022-02-12 RX ORDER — OXYCODONE HYDROCHLORIDE 5 MG/1
2.5 TABLET ORAL EVERY 4 HOURS
Refills: 0 | Status: DISCONTINUED | OUTPATIENT
Start: 2022-02-12 | End: 2022-02-12

## 2022-02-12 RX ORDER — SODIUM CHLORIDE 9 MG/ML
500 INJECTION, SOLUTION INTRAVENOUS ONCE
Refills: 0 | Status: DISCONTINUED | OUTPATIENT
Start: 2022-02-12 | End: 2022-02-13

## 2022-02-12 RX ORDER — HYDRALAZINE HCL 50 MG
25 TABLET ORAL
Refills: 0 | Status: DISCONTINUED | OUTPATIENT
Start: 2022-02-12 | End: 2022-02-13

## 2022-02-12 RX ORDER — LOSARTAN POTASSIUM 100 MG/1
100 TABLET, FILM COATED ORAL DAILY
Refills: 0 | Status: DISCONTINUED | OUTPATIENT
Start: 2022-02-12 | End: 2022-02-22

## 2022-02-12 RX ORDER — LABETALOL HCL 100 MG
100 TABLET ORAL
Refills: 0 | Status: DISCONTINUED | OUTPATIENT
Start: 2022-02-12 | End: 2022-02-22

## 2022-02-12 RX ORDER — MAGNESIUM HYDROXIDE 400 MG/1
30 TABLET, CHEWABLE ORAL DAILY
Refills: 0 | Status: DISCONTINUED | OUTPATIENT
Start: 2022-02-12 | End: 2022-02-22

## 2022-02-12 RX ADMIN — AMLODIPINE BESYLATE 10 MILLIGRAM(S): 2.5 TABLET ORAL at 05:27

## 2022-02-12 RX ADMIN — Medication 650 MILLIGRAM(S): at 18:46

## 2022-02-12 RX ADMIN — Medication 100 MILLIGRAM(S): at 18:46

## 2022-02-12 RX ADMIN — Medication 975 MILLIGRAM(S): at 05:28

## 2022-02-12 RX ADMIN — Medication 975 MILLIGRAM(S): at 05:32

## 2022-02-12 RX ADMIN — SODIUM CHLORIDE 75 MILLILITER(S): 9 INJECTION, SOLUTION INTRAVENOUS at 13:01

## 2022-02-12 RX ADMIN — Medication 25 MILLIGRAM(S): at 05:27

## 2022-02-12 RX ADMIN — Medication 25 MILLIGRAM(S): at 18:46

## 2022-02-12 RX ADMIN — SENNA PLUS 2 TABLET(S): 8.6 TABLET ORAL at 22:44

## 2022-02-12 RX ADMIN — OXYCODONE HYDROCHLORIDE 5 MILLIGRAM(S): 5 TABLET ORAL at 05:27

## 2022-02-12 RX ADMIN — Medication 100 MILLIGRAM(S): at 05:28

## 2022-02-12 RX ADMIN — OXYCODONE HYDROCHLORIDE 5 MILLIGRAM(S): 5 TABLET ORAL at 05:57

## 2022-02-12 RX ADMIN — Medication 100 MILLIGRAM(S): at 22:43

## 2022-02-12 RX ADMIN — Medication 650 MILLIGRAM(S): at 21:03

## 2022-02-12 NOTE — CONSULT NOTE ADULT - ASSESSMENT
full note to follow 81y Female presents s/p [facility] fall c/o severe left hip pain and inability to ambulate.  Patient denies headstrike or LOC. Patient denies radiation of pain. Patient denies has mild numbness in LLE. No other bone/joint complaints. Patient is a community ambulator at baseline with assistive device    Acute left femoral necck fracture  - need urgent surgical repair  - pt has no medical contraindications for the planned procedure  - Dr Denise from cardiology called for cardiac clearance    ESRD  - HD as per renal    HTN  - c/w Norvasc hydralazine and labetalol    DVT px

## 2022-02-12 NOTE — PROGRESS NOTE ADULT - ASSESSMENT
82y/o female with history of HTN , ESRD presents with hip pain s/p fall, found to have hip fracture    ESRD on HD ( MWF)  Outpt unit [facility]   Consent obtained for HD   Pt hip fracture -- surgery is scheduled at 10 AM today.   s/p HD 2/11 yesterday with 1.7 L UF  Plan for HD 2/14 Monday  Monitor BMP and BP    Anemia  Does not tolerate Epogen/ aranesp  On Mircera 100mg Bi Montly  Last dose on 2/7   PRBC as needed to keep Hb >7.5    HTN  Bp stable  Resume outpt anti-hypertensives   Monitor BP    CKD -BMD  Check PTH  Monitor serum calcium and PO4 daily

## 2022-02-12 NOTE — PROGRESS NOTE ADULT - ASSESSMENT
Impression: Stable       Plan:   Continue present treatment                 NPO, Preop, surgery today                  Continue to monitor      Eliot Marsh PA-C  Orthopaedic Surgery  Team pager [phone]  [phone]

## 2022-02-12 NOTE — CONSULT NOTE ADULT - SUBJECTIVE AND OBJECTIVE BOX
81y Female presents s/p [facility] fall c/o severe left hip pain and inability to ambulate.  Patient denies headstrike or LOC. Patient denies radiation of pain. Patient denies has mild numbness in LLE. No other bone/joint complaints. Patient is a community ambulator at baseline with assistive device    PAST MEDICAL & SURGICAL HISTORY:  ESRD with HD MWF    MEDICATIONS  (STANDING):  fentaNYL    Injectable 25 MICROGram(s) IV Push Once  sodium chloride 0.9%. 1000 milliLiter(s) (50 mL/Hr) IV Continuous <Continuous>    MEDICATIONS  (PRN):    Allergies    No Known Allergies    Intolerances                              14.1   4.72  )-----------( 106      ( 11 Feb 2022 11:42 )             45.3     02-11    140  |  98  |  46<H>  ----------------------------<  103<H>  4.9   |  27  |  6.66<H>    Ca    9.3      11 Feb 2022 12:47    TPro  6.7  /  Alb  4.6  /  TBili  0.6  /  DBili  x   /  AST  65<H>  /  ALT  18  /  AlkPhos  164<H>  02-11    PT/INR - ( 11 Feb 2022 11:42 )   PT: 11.0 sec;   INR: 0.91 ratio         PTT - ( 11 Feb 2022 11:42 )  PTT:40.7 sec    T(C): 36.8 (02-11-22 @ 10:17), Max: 36.8 (02-11-22 @ 10:17)  HR: 70 (02-11-22 @ 12:21) (70 - 74)  BP: 188/63 (02-11-22 @ 12:21) (188/63 - 207/63)  RR: 20 (02-11-22 @ 12:21) (20 - 20)  SpO2: 92% (02-11-22 @ 12:21) (92% - 99%)  Wt(kg): --    Physical Exam  Gen: NAD  Resp: Non-labored breathing  LLE:  + TTP about hip. No TTP to knee/leg/ankle/foot   ROM limited 2/2 pain   Unable to SLR; + Log Roll/Heel Strike  Motor intact GS/TA/FHL/EHL  SILT L2-S1  Compartments soft  Warm    RLE/BUE:   No bony TTP; Good ROM w/o pain; Exam Unremarkable    Imaging:  XR demonstrating Left femoral neck  fracture  < from: Xray Hip w/ Pelvis 2 or 3 Views, Left (02.11.22 @ 11:45) >  INTERPRETATION:  EXAMINATION: XR HIP WITH PELVIS 2 OR 3 VIEWS LEFT, XR   FEMUR 2 VIEWS LEFT    CLINICAL INDICATION:Trauma    COMPARISON: None    TECHNIQUE: AP pelvis, AP and crosstable lateral radiographs of the left   hip, and AP and lateral radiographs of the left femur were obtained.    INTERPRETATION: There is a displacedcomplete fracture across the left   femoral neck with lateral and proximal displacement of the distal   fracture segment by one half shaft width. There is impaction at the site   of fracture. No other acute fracture is identified.    There is osteopenia. There is vascular atherosclerosis.    IMPRESSION: Acute left femoral neck fracture.      < end of copied text >       (11 Feb 2022 14:48)    02-12-22 @ 07:12  PAST MEDICAL & SURGICAL HISTORY:      Review of Systems:   CONSTITUTIONAL: No fever, weight loss, or fatigue  EYES: No eye pain, visual disturbances, or discharge  ENMT:  No difficulty hearing, tinnitus, vertigo; No sinus or throat pain  NECK: No pain or stiffness  BREASTS: No pain, masses, or nipple discharge  RESPIRATORY: No cough, wheezing, chills or hemoptysis; No shortness of breath  CARDIOVASCULAR: No chest pain, palpitations, dizziness, or leg swelling  GASTROINTESTINAL: No abdominal or epigastric pain. No nausea, vomiting, or hematemesis; No diarrhea or constipation. No melena or hematochezia.  GENITOURINARY: No dysuria, frequency, hematuria, or incontinence  NEUROLOGICAL: No headaches, memory loss, loss of strength, numbness, or tremors  SKIN: No itching, burning, rashes, or lesions   LYMPH NODES: No enlarged glands  ENDOCRINE: No heat or cold intolerance; No hair loss  MUSCULOSKELETAL: No joint pain or swelling; No muscle, back, or extremity pain  PSYCHIATRIC: No depression, anxiety, mood swings, or difficulty sleeping  HEME/LYMPH: No easy bruising, or bleeding gums  ALLERY AND IMMUNOLOGIC: No hives or eczema    Allergies    No Known Allergies    Intolerances        Social History:     FAMILY HISTORY:      MEDICATIONS  (STANDING):  acetaminophen     Tablet .. 975 milliGRAM(s) Oral every 8 hours  amLODIPine   Tablet 10 milliGRAM(s) Oral daily  hydrALAZINE 25 milliGRAM(s) Oral four times a day  labetalol 100 milliGRAM(s) Oral daily  sodium chloride 0.9%. 1000 milliLiter(s) (50 mL/Hr) IV Continuous <Continuous>    MEDICATIONS  (PRN):  oxyCODONE    IR 2.5 milliGRAM(s) Oral every 4 hours PRN Moderate Pain (4 - 6)  oxyCODONE    IR 5 milliGRAM(s) Oral every 4 hours PRN Severe Pain (7 - 10)      Vital Signs Last 24 Hrs  T(C): 37.1 (12 Feb 2022 04:20), Max: 37.1 (12 Feb 2022 04:20)  T(F): 98.8 (12 Feb 2022 04:20), Max: 98.8 (12 Feb 2022 04:20)  HR: 73 (12 Feb 2022 04:20) (63 - 74)  BP: 168/60 (12 Feb 2022 04:20) (132/60 - 207/63)  BP(mean): --  RR: 18 (12 Feb 2022 04:20) (18 - 20)  SpO2: 93% (12 Feb 2022 04:20) (92% - 99%)  CAPILLARY BLOOD GLUCOSE        I&O's Summary    11 Feb 2022 07:01  -  12 Feb 2022 07:00  --------------------------------------------------------  IN: 0 mL / OUT: 1700 mL / NET: -1700 mL        PHYSICAL EXAM:  GENERAL: NAD, well-developed  HEAD:  Atraumatic, Normocephalic  EYES: EOMI, PERRLA, conjunctiva and sclera clear  NECK: Supple, No JVD  CHEST/LUNG: Clear to auscultation bilaterally; No wheeze  HEART: Regular rate and rhythm; No murmurs, rubs, or gallops  ABDOMEN: Soft, Nontender, Nondistended; Bowel sounds present  EXTREMITIES:  2+ Peripheral Pulses, No clubbing, cyanosis, or edema  PSYCH: AAOx3  NEUROLOGY: non-focal  SKIN: No rashes or lesions    LABS:                        14.1   4.72  )-----------( 106      ( 11 Feb 2022 11:42 )             45.3     02-12    137  |  95<L>  |  26<H>  ----------------------------<  94  4.4   |  26  |  4.53<H>    Ca    9.8      12 Feb 2022 04:50    TPro  6.5  /  Alb  4.3  /  TBili  0.7  /  DBili  x   /  AST  15  /  ALT  10  /  AlkPhos  169<H>  02-12    PT/INR - ( 12 Feb 2022 04:50 )   PT: 11.0 sec;   INR: 0.91 ratio         PTT - ( 12 Feb 2022 04:50 )  PTT:36.2 sec          RADIOLOGY & ADDITIONAL TESTS:    Imaging Personally Reviewed:    Consultant(s) Notes Reviewed:      Care Discussed with Consultants/Other Providers:   81y Female presents s/p [facility] fall c/o severe left hip pain and inability to ambulate.  Patient denies headstrike or LOC. Patient denies radiation of pain. Patient denies has mild numbness in LLE. No other bone/joint complaints. Patient is a community ambulator at baseline with assistive device    PAST MEDICAL & SURGICAL HISTORY:  ESRD with HD MWF    MEDICATIONS  (STANDING):  fentaNYL    Injectable 25 MICROGram(s) IV Push Once  sodium chloride 0.9%. 1000 milliLiter(s) (50 mL/Hr) IV Continuous <Continuous>    MEDICATIONS  (PRN):    Allergies    No Known Allergies    Intolerances                              14.1   4.72  )-----------( 106      ( 11 Feb 2022 11:42 )             45.3     02-11    140  |  98  |  46<H>  ----------------------------<  103<H>  4.9   |  27  |  6.66<H>    Ca    9.3      11 Feb 2022 12:47    TPro  6.7  /  Alb  4.6  /  TBili  0.6  /  DBili  x   /  AST  65<H>  /  ALT  18  /  AlkPhos  164<H>  02-11    PT/INR - ( 11 Feb 2022 11:42 )   PT: 11.0 sec;   INR: 0.91 ratio         PTT - ( 11 Feb 2022 11:42 )  PTT:40.7 sec    T(C): 36.8 (02-11-22 @ 10:17), Max: 36.8 (02-11-22 @ 10:17)  HR: 70 (02-11-22 @ 12:21) (70 - 74)  BP: 188/63 (02-11-22 @ 12:21) (188/63 - 207/63)  RR: 20 (02-11-22 @ 12:21) (20 - 20)  SpO2: 92% (02-11-22 @ 12:21) (92% - 99%)  Wt(kg): --    Physical Exam  Gen: NAD  Resp: Non-labored breathing  LLE:  + TTP about hip. No TTP to knee/leg/ankle/foot   ROM limited 2/2 pain   Unable to SLR; + Log Roll/Heel Strike  Motor intact GS/TA/FHL/EHL  SILT L2-S1  Compartments soft  Warm    RLE/BUE:   No bony TTP; Good ROM w/o pain; Exam Unremarkable    Imaging:  XR demonstrating Left femoral neck  fracture  < from: Xray Hip w/ Pelvis 2 or 3 Views, Left (02.11.22 @ 11:45) >  INTERPRETATION:  EXAMINATION: XR HIP WITH PELVIS 2 OR 3 VIEWS LEFT, XR   FEMUR 2 VIEWS LEFT    CLINICAL INDICATION:Trauma    COMPARISON: None    TECHNIQUE: AP pelvis, AP and crosstable lateral radiographs of the left   hip, and AP and lateral radiographs of the left femur were obtained.    INTERPRETATION: There is a displacedcomplete fracture across the left   femoral neck with lateral and proximal displacement of the distal   fracture segment by one half shaft width. There is impaction at the site   of fracture. No other acute fracture is identified.    There is osteopenia. There is vascular atherosclerosis.    IMPRESSION: Acute left femoral neck fracture.      < end of copied text >       (11 Feb 2022 14:48)    02-12-22 @ 07:12  PAST MEDICAL & SURGICAL HISTORY:      Review of Systems:   CONSTITUTIONAL: No fever, weight loss, or fatigue  EYES: No eye pain, visual disturbances, or discharge  ENMT:  No difficulty hearing, tinnitus, vertigo; No sinus or throat pain  NECK: No pain or stiffness  BREASTS: No pain, masses, or nipple discharge  RESPIRATORY: No cough, wheezing, chills or hemoptysis; No shortness of breath  CARDIOVASCULAR: No chest pain, palpitations, dizziness, or leg swelling  GASTROINTESTINAL: No abdominal or epigastric pain. No nausea, vomiting, or hematemesis; No diarrhea or constipation. No melena or hematochezia.  GENITOURINARY: No dysuria, frequency, hematuria, or incontinence  NEUROLOGICAL: No headaches, memory loss, loss of strength, numbness, or tremors  SKIN: No itching, burning, rashes, or lesions   LYMPH NODES: No enlarged glands  ENDOCRINE: No heat or cold intolerance; No hair loss  MUSCULOSKELETAL: No joint pain or swelling; No muscle, back, or extremity pain  PSYCHIATRIC: No depression, anxiety, mood swings, or difficulty sleeping  HEME/LYMPH: No easy bruising, or bleeding gums  ALLERY AND IMMUNOLOGIC: No hives or eczema    Allergies    No Known Allergies    Intolerances        Social History:     FAMILY HISTORY:      MEDICATIONS  (STANDING):  acetaminophen     Tablet .. 975 milliGRAM(s) Oral every 8 hours  amLODIPine   Tablet 10 milliGRAM(s) Oral daily  hydrALAZINE 25 milliGRAM(s) Oral four times a day  labetalol 100 milliGRAM(s) Oral daily  sodium chloride 0.9%. 1000 milliLiter(s) (50 mL/Hr) IV Continuous <Continuous>    MEDICATIONS  (PRN):  oxyCODONE    IR 2.5 milliGRAM(s) Oral every 4 hours PRN Moderate Pain (4 - 6)  oxyCODONE    IR 5 milliGRAM(s) Oral every 4 hours PRN Severe Pain (7 - 10)      Vital Signs Last 24 Hrs  T(C): 37.1 (12 Feb 2022 04:20), Max: 37.1 (12 Feb 2022 04:20)  T(F): 98.8 (12 Feb 2022 04:20), Max: 98.8 (12 Feb 2022 04:20)  HR: 73 (12 Feb 2022 04:20) (63 - 74)  BP: 168/60 (12 Feb 2022 04:20) (132/60 - 207/63)  BP(mean): --  RR: 18 (12 Feb 2022 04:20) (18 - 20)  SpO2: 93% (12 Feb 2022 04:20) (92% - 99%)  CAPILLARY BLOOD GLUCOSE        I&O's Summary    11 Feb 2022 07:01  -  12 Feb 2022 07:00  --------------------------------------------------------  IN: 0 mL / OUT: 1700 mL / NET: -1700 mL        PHYSICAL EXAM:  GENERAL: NAD, well-developed  HEAD:  Atraumatic, Normocephalic  EYES: EOMI, PERRLA, conjunctiva and sclera clear  NECK: Supple, No JVD  CHEST/LUNG: Clear to auscultation bilaterally; No wheeze  HEART: Regular rate and rhythm; No murmurs, rubs, or gallops  ABDOMEN: Soft, Nontender, Nondistended; Bowel sounds present  EXTREMITIES:  2+ Peripheral Pulses, No clubbing, cyanosis, or edema, left hip pain  PSYCH: AAOx3  NEUROLOGY: non-focal  SKIN: No rashes or lesions    LABS:                        14.1   4.72  )-----------( 106      ( 11 Feb 2022 11:42 )             45.3     02-12    137  |  95<L>  |  26<H>  ----------------------------<  94  4.4   |  26  |  4.53<H>    Ca    9.8      12 Feb 2022 04:50    TPro  6.5  /  Alb  4.3  /  TBili  0.7  /  DBili  x   /  AST  15  /  ALT  10  /  AlkPhos  169<H>  02-12    PT/INR - ( 12 Feb 2022 04:50 )   PT: 11.0 sec;   INR: 0.91 ratio         PTT - ( 12 Feb 2022 04:50 )  PTT:36.2 sec    < from: Xray Hip w/ Pelvis 2 or 3 Views, Left (02.11.22 @ 11:45) >    INTERPRETATION: There is a displacedcomplete fracture across the left   femoral neck with lateral and proximal displacement of the distal   fracture segment by one half shaft width. There is impaction at the site   of fracture. No other acute fracture is identified.    There is osteopenia. There is vascular atherosclerosis.    IMPRESSION: Acute left femoral neck fracture.    < end of copied text >        RADIOLOGY & ADDITIONAL TESTS:    Imaging Personally Reviewed:    Consultant(s) Notes Reviewed:      Care Discussed with Consultants/Other Providers:

## 2022-02-12 NOTE — PHYSICAL THERAPY INITIAL EVALUATION ADULT - PERTINENT HX OF CURRENT PROBLEM, REHAB EVAL
80 yo female with PMH for ESRD on HD MWF, HTN presents with complaint of left hip pain and inability to walk sp falling backwards when getting up from stool. Patient was found to have a left femoral neck fracture. She is s/p L Hip Ravinder arthroplasty

## 2022-02-12 NOTE — PROGRESS NOTE ADULT - ASSESSMENT
80 yo woman with a hx of HTN and ESRD presents after a fall with a left hip fracture. Patient is scheduled for an Open reduction and internal fixation of the left hip

## 2022-02-12 NOTE — PHYSICAL THERAPY INITIAL EVALUATION ADULT - ADDITIONAL COMMENTS
as per pt: PTA pt was living in an apartment with elevator access to living floor, and was independent in all functional mobility and ADL's. walker for gait. lives with daughter.

## 2022-02-12 NOTE — PROGRESS NOTE ADULT - PROBLEM SELECTOR PLAN 2
continue amlodipine, Hydralazine, and labetalol  will continue to monitor BP and adjust meds as needed   Renal diet.

## 2022-02-13 ENCOUNTER — TRANSCRIPTION ENCOUNTER (OUTPATIENT)
Age: 82
End: 2022-02-13

## 2022-02-13 LAB
ANION GAP SERPL CALC-SCNC: 20 MMOL/L — HIGH (ref 5–17)
BUN SERPL-MCNC: 49 MG/DL — HIGH (ref 7–23)
CALCIUM SERPL-MCNC: 9.3 MG/DL — SIGNIFICANT CHANGE UP (ref 8.4–10.5)
CHLORIDE SERPL-SCNC: 93 MMOL/L — LOW (ref 96–108)
CO2 SERPL-SCNC: 22 MMOL/L — SIGNIFICANT CHANGE UP (ref 22–31)
CREAT SERPL-MCNC: 5.89 MG/DL — HIGH (ref 0.5–1.3)
GLUCOSE SERPL-MCNC: 114 MG/DL — HIGH (ref 70–99)
HCT VFR BLD CALC: 41.2 % — SIGNIFICANT CHANGE UP (ref 34.5–45)
HGB BLD-MCNC: 12.8 G/DL — SIGNIFICANT CHANGE UP (ref 11.5–15.5)
MCHC RBC-ENTMCNC: 29.2 PG — SIGNIFICANT CHANGE UP (ref 27–34)
MCHC RBC-ENTMCNC: 31.1 GM/DL — LOW (ref 32–36)
MCV RBC AUTO: 93.8 FL — SIGNIFICANT CHANGE UP (ref 80–100)
NRBC # BLD: 0 /100 WBCS — SIGNIFICANT CHANGE UP (ref 0–0)
PLATELET # BLD AUTO: 103 K/UL — LOW (ref 150–400)
POTASSIUM SERPL-MCNC: 5.6 MMOL/L — HIGH (ref 3.5–5.3)
POTASSIUM SERPL-SCNC: 5.6 MMOL/L — HIGH (ref 3.5–5.3)
RBC # BLD: 4.39 M/UL — SIGNIFICANT CHANGE UP (ref 3.8–5.2)
RBC # FLD: 17.1 % — HIGH (ref 10.3–14.5)
SODIUM SERPL-SCNC: 135 MMOL/L — SIGNIFICANT CHANGE UP (ref 135–145)
WBC # BLD: 5.29 K/UL — SIGNIFICANT CHANGE UP (ref 3.8–10.5)
WBC # FLD AUTO: 5.29 K/UL — SIGNIFICANT CHANGE UP (ref 3.8–10.5)

## 2022-02-13 RX ORDER — DEXLANSOPRAZOLE 30 MG/1
60 CAPSULE, DELAYED RELEASE ORAL DAILY
Refills: 0 | Status: DISCONTINUED | OUTPATIENT
Start: 2022-02-14 | End: 2022-02-22

## 2022-02-13 RX ORDER — SODIUM ZIRCONIUM CYCLOSILICATE 10 G/10G
10 POWDER, FOR SUSPENSION ORAL
Refills: 0 | Status: COMPLETED | OUTPATIENT
Start: 2022-02-13 | End: 2022-02-14

## 2022-02-13 RX ADMIN — Medication 25 MILLIGRAM(S): at 00:47

## 2022-02-13 RX ADMIN — Medication 650 MILLIGRAM(S): at 03:52

## 2022-02-13 RX ADMIN — Medication 650 MILLIGRAM(S): at 05:25

## 2022-02-13 RX ADMIN — ENOXAPARIN SODIUM 30 MILLIGRAM(S): 100 INJECTION SUBCUTANEOUS at 11:44

## 2022-02-13 RX ADMIN — Medication 100 MILLIGRAM(S): at 05:26

## 2022-02-13 RX ADMIN — Medication 100 MILLIGRAM(S): at 17:01

## 2022-02-13 RX ADMIN — SODIUM ZIRCONIUM CYCLOSILICATE 10 GRAM(S): 10 POWDER, FOR SUSPENSION ORAL at 15:03

## 2022-02-13 RX ADMIN — Medication 650 MILLIGRAM(S): at 23:12

## 2022-02-13 RX ADMIN — Medication 650 MILLIGRAM(S): at 17:30

## 2022-02-13 RX ADMIN — Medication 1 GRAM(S): at 11:43

## 2022-02-13 RX ADMIN — Medication 25 MILLIGRAM(S): at 11:43

## 2022-02-13 RX ADMIN — Medication 650 MILLIGRAM(S): at 11:43

## 2022-02-13 RX ADMIN — Medication 650 MILLIGRAM(S): at 23:51

## 2022-02-13 RX ADMIN — Medication 25 MILLIGRAM(S): at 05:26

## 2022-02-13 RX ADMIN — Medication 650 MILLIGRAM(S): at 00:47

## 2022-02-13 RX ADMIN — Medication 650 MILLIGRAM(S): at 17:02

## 2022-02-13 RX ADMIN — Medication 650 MILLIGRAM(S): at 16:52

## 2022-02-13 RX ADMIN — Medication 1 GRAM(S): at 00:55

## 2022-02-13 RX ADMIN — PANTOPRAZOLE SODIUM 40 MILLIGRAM(S): 20 TABLET, DELAYED RELEASE ORAL at 05:47

## 2022-02-13 RX ADMIN — Medication 650 MILLIGRAM(S): at 07:23

## 2022-02-13 RX ADMIN — AMLODIPINE BESYLATE 10 MILLIGRAM(S): 2.5 TABLET ORAL at 05:25

## 2022-02-13 RX ADMIN — Medication 1 GRAM(S): at 05:26

## 2022-02-13 RX ADMIN — LOSARTAN POTASSIUM 100 MILLIGRAM(S): 100 TABLET, FILM COATED ORAL at 05:26

## 2022-02-13 NOTE — OCCUPATIONAL THERAPY INITIAL EVALUATION ADULT - PERTINENT HX OF CURRENT PROBLEM, REHAB EVAL
81F presents s/p [facility] fall c/o severe left hip pain and inability to ambulate.  Patient denies headstrike or LOC. Patient denies radiation of pain. Patient denies has mild numbness in LLE. No other bone/joint complaints. Patient is a community ambulator at baseline with assistive device. s/p L hemiarthroplasty on 2/11.

## 2022-02-13 NOTE — DISCHARGE NOTE PROVIDER - HOSPITAL COURSE
Reason for Admission: Left Femoral Neck fracture  History of Present Illness:   HPI  81y Female presents s/p mech fall c/o severe left hip pain and inability to ambulate.  Patient denies headstrike or LOC. Patient denies radiation of pain. Patient denies has mild numbness in LLE. No other bone/joint complaints. Patient is a community ambulator at baseline with assistive device    PAST MEDICAL & SURGICAL HISTORY:  ESRD with HD MWF    HOSPITAL COURSE:  80 y/o FM underwent left hip hemiarthroplasty on 2/12/2022 with Dr. Cabello.  Patient tolerated procedure well.  Patient was evaluated postoperatively by physical and occupational therapists for weight bearing as tolerated ambulation and advised that patient would benefit from admission to rehab facility.  Patient advised to keep surgical incision/dressing clean and dry, and have dressing/sutures/surgical staples removed and steri strips applied post op day #14 (2/26/2022).  Patient further advised to follow up with Dr. Cabello in his office 3-4 weeks post op.   Reason for Admission: Left Femoral Neck fracture  History of Present Illness:   HPI  81y Female presents s/p mech fall c/o severe left hip pain and inability to ambulate.  Patient denies headstrike or LOC. Patient denies radiation of pain. Patient denies has mild numbness in LLE. No other bone/joint complaints. Patient is a community ambulator at baseline with assistive device    PAST MEDICAL & SURGICAL HISTORY:  ESRD with HD MWF    HOSPITAL COURSE:  82 y/o FM underwent left hip hemiarthroplasty on 2/12/2022 with Dr. Cabello.  Patient tolerated procedure well.  Patient was evaluated postoperatively by physical and occupational therapists for weight bearing as tolerated ambulation and advised that patient would benefit from admission to rehab facility.  During stay, patient co-managed by Nephrology for Dialysis and Cardiology and Internal Medicine.  Patient evaluated by speech for odynophagia, patient tolerating PO, but treated for thrush.  Patient advised to keep surgical incision/dressing clean and dry, and have dressing/sutures/surgical staples removed and steri strips applied post op day #14 (2/26/2022).  Patient further advised to follow up with Dr. Cabello in his office 3-4 weeks post op.

## 2022-02-13 NOTE — DISCHARGE NOTE PROVIDER - NSDCMRMEDTOKEN_GEN_ALL_CORE_FT
acetaminophen 325 mg oral tablet: 2 tab(s) orally every 6 hours, As needed, Temp greater or equal to 38C (100.4F), Moderate Pain (4 - 6), Severe Pain (7 - 10)  amLODIPine 10 mg oral tablet: 1 tab(s) orally once a day  amLODIPine 10 mg oral tablet: 1 tab(s) orally once a day  Auryxia:   Carafate 1 g oral tablet: 1 tab(s) orally 4 times a day (before meals and at bedtime)  Dexilant:   esomeprazole 40 mg oral delayed release capsule: 1 cap(s) orally once a day  hydrALAZINE 25 mg oral tablet: 1 tab(s) orally 4 times a day  hydrALAZINE 25 mg oral tablet: 1 tab(s) orally 3 times a day  hydrocortisone 25 mg rectal suppository: 1 suppository(ies) rectal once a day (at bedtime)  labetalol 100 mg oral tablet: 1 tab(s) orally 2 times a day  labetalol 100 mg oral tablet: 1 tab(s) orally 3 times a day  lactulose 10 g/15 mL oral syrup: 22.5 milliliter(s) orally once a day, As needed, constipation  Multiple Vitamins oral tablet: 1 tab(s) orally once a day  NexIUM 40 mg oral delayed release capsule: 1 cap(s) orally once a day  olmesartan 40 mg oral tablet: 1 tab(s) orally once a day  olmesartan 40 mg oral tablet: 1 tab(s) orally once a day  polyethylene glycol 3350 oral powder for reconstitution: 17 gram(s) orally 2 times a day  Shayna-Krystyna oral tablet: 1 tab(s) orally once a day  Shayna-Krystyna oral tablet: 1 tab(s) orally once a day  senna oral tablet: 2 tab(s) orally once a day (at bedtime)  sucralfate 1 g oral tablet: 1 tab(s) orally 4 times a day (before meals and at bedtime), As Needed   acetaminophen 325 mg oral tablet: 3 tab(s) orally every 8 hours, As needed, Temp greater or equal to 38C (100.4F), Mild Pain (1 - 3)  amLODIPine 10 mg oral tablet: 1 tab(s) orally once a day  apixaban 2.5 mg oral tablet: 1 tab(s) orally 2 times a day  Auryxia:   Carafate 1 g oral tablet: 1 tab(s) orally 4 times a day (before meals and at bedtime)  dexlansoprazole 60 mg oral delayed release capsule: 1 cap(s) orally once a day  doxercalciferol 2 mcg/mL intravenous solution: 2 milliliter(s) intravenous Monday Wedenesday, Friday intradialysis  hydrALAZINE 25 mg oral tablet: 1 tab(s) orally 3 times a day  labetalol 100 mg oral tablet: 1 tab(s) orally 2 times a day  NexIUM 40 mg oral delayed release capsule: 1 cap(s) orally once a day  olmesartan 40 mg oral tablet: 1 tab(s) orally once a day  oxyCODONE 5 mg oral tablet: 0.5-1 tab(s) orally every 4 hours, As needed, Moderate to Severe Pain  polyethylene glycol 3350 oral powder for reconstitution: 17 gram(s) orally once a day (at bedtime)  Shayna-Krystyna oral tablet: 1 tab(s) orally once a day  senna oral tablet: 2 tab(s) orally once a day (at bedtime)   acetaminophen 325 mg oral tablet: 3 tab(s) orally every 8 hours, As needed, Temp greater or equal to 38C (100.4F), Mild Pain (1 - 3)  amLODIPine 10 mg oral tablet: 1 tab(s) orally once a day  apixaban 2.5 mg oral tablet: 1 tab(s) orally 2 times a day x 6 weeks total for dvt prevention  Auryxia:   Carafate 1 g oral tablet: 1 tab(s) orally 4 times a day (before meals and at bedtime)  dexlansoprazole 60 mg oral delayed release capsule: 1 cap(s) orally once a day  doxercalciferol 2 mcg/mL intravenous solution: 2 milliliter(s) intravenous Monday Wedenesday, Friday intradialysis  hydrALAZINE 25 mg oral tablet: 1 tab(s) orally 3 times a day  labetalol 100 mg oral tablet: 1 tab(s) orally 2 times a day  NexIUM 40 mg oral delayed release capsule: 1 cap(s) orally once a day  olmesartan 40 mg oral tablet: 1 tab(s) orally once a day  oxyCODONE 5 mg oral tablet: 0.5-1 tab(s) orally every 4 hours, As needed, Moderate to Severe Pain  polyethylene glycol 3350 oral powder for reconstitution: 17 gram(s) orally once a day (at bedtime)  Shayna-Krystyna oral tablet: 1 tab(s) orally once a day  senna oral tablet: 2 tab(s) orally once a day (at bedtime)

## 2022-02-13 NOTE — PROGRESS NOTE ADULT - ASSESSMENT
Assessment: s/p L Hemiarthroplasty    Plan:   PT: WBAT" posterior precautions  ck labs  Lovenox QD  Dispo: TBD Assessment: s/p L Hemiarthroplasty    Plan:   PT: WBAT" posterior precautions  ck labs  Lovenox QD  Renal note appreciated-- for HD 2/14  Dispo: TBD

## 2022-02-13 NOTE — DISCHARGE NOTE PROVIDER - CARE PROVIDER_API CALL
Jeremy Cabello)  Orthopaedic Surgery  [address] [place] [address], [address]  [place] [address]  Phone: [phone]  Fax: [phone]  Follow Up Time:

## 2022-02-13 NOTE — PROGRESS NOTE ADULT - ASSESSMENT
80y/o female with history of HTN , ESRD presents with hip pain s/p fall, found to have hip fracture. s/p L Hemiarthroplasty 02/12.     ESRD on HD ( MWF)  Outpt unit [facility]   Consent obtained for HD   s/p L Hemiarthroplasty 02/12  s/p HD 2/11  with 1.7 L UF  Plan for HD 2/14 Monday  Monitor BMP and BP    Hyperkalemia  Lokelma 10 mg 2 doses then repeat serum K ordered   if high call Dr. Montgomery  Monitor K  Low k diet    Anemia  Does not tolerate Epogen/ aranesp  On Mircera 100mg Bi Montly  Last dose on 2/7   PRBC as needed to keep Hb >7.5    HTN  Bp stable  Resume outpt anti-hypertensives   Monitor BP    CKD -BMD  Check PTH  Monitor serum calcium and PO4 daily  80y/o female with history of HTN , ESRD presents with hip pain s/p fall, found to have hip fracture. s/p L Hemiarthroplasty 02/12.     ESRD on HD ( MWF)  Outpt unit [facility]   Consent obtained for HD   s/p L Hemiarthroplasty 02/12  s/p HD 2/11  with 1.7 L UF  Plan for HD 2/14 Monday  Monitor BMP and BP    Hyperkalemia  Lokelma 10 mg 2 doses ordered   repeat serum K in PM   if elevated  remains high call nephrology  Monitor K  Low k diet    Anemia  Does not tolerate Epogen/ aranesp  On Mircera 100mg Bi Montly  Last dose on 2/7   PRBC as needed to keep Hb >7.5    HTN  Bp stable  Monitor BP    CKD -BMD  Check PTH  Monitor serum calcium and PO4 daily

## 2022-02-13 NOTE — DISCHARGE NOTE PROVIDER - DETAILS OF MALNUTRITION DIAGNOSIS/DIAGNOSES
This patient has been assessed with a concern for Malnutrition and was treated during this hospitalization for the following Nutrition diagnosis/diagnoses:     -  02/14/2022: Severe protein-calorie malnutrition   -  02/14/2022: Underweight (BMI < 19)

## 2022-02-13 NOTE — OCCUPATIONAL THERAPY INITIAL EVALUATION ADULT - LIVES WITH, PROFILE
Pt lives in apartment with elevator access with daughter. Pt performs ADLs and fxnl mobility independently with RW.

## 2022-02-13 NOTE — DISCHARGE NOTE PROVIDER - NSDCDCMDCOMP_GEN_ALL_CORE
Patient Education    BRIGHT FUTURES HANDOUT- PARENT  7 YEAR VISIT  Here are some suggestions from [employer] experts that may be of value to your family.     HOW YOUR FAMILY IS DOING  Encourage your child to be independent and responsible. Hug and praise her.  Spend time with your child. Get to know her friends and their families.  Take pride in your child for good behavior and doing well in school.  Help your child deal with conflict.  If you are worried about your living or food situation, talk with us. Community agencies and programs such as [employer] can also provide information and assistance.  Don t smoke or use e-cigarettes. Keep your home and car smoke-free. Tobacco-free spaces keep children healthy.  Don t use alcohol or drugs. If you re worried about a family member s use, let us know, or reach out to local or online resources that can help.  Put the family computer in a central place.  Know who your child talks with online.  Install a safety filter.    STAYING HEALTHY  Take your child to the dentist twice a year.  Give a fluoride supplement if the dentist recommends it.  Help your child brush her teeth twice a day  After breakfast  Before bed  Use a pea-sized amount of toothpaste with fluoride.  Help your child floss her teeth once a day.  Encourage your child to always wear a mouth guard to protect her teeth while playing sports.  Encourage healthy eating by  Eating together often as a family  Serving vegetables, fruits, whole grains, lean protein, and low-fat or fat-free dairy  Limiting sugars, salt, and low-nutrient foods  Limit screen time to 2 hours (not counting schoolwork).  Don t put a TV or computer in your child s bedroom.  Consider making a family media use plan. It helps you make rules for media use and balance screen time with other activities, including exercise.  Encourage your child to play actively for at least 1 hour daily.    YOUR GROWING CHILD  Give your child chores to do and expect  them to be done.  Be a good role model.  Don t hit or allow others to hit.  Help your child do things for himself.  Teach your child to help others.  Discuss rules and consequences with your child.  Be aware of puberty and changes in your child s body.  Use simple responses to answer your child s questions.  Talk with your child about what worries him.    SCHOOL  Help your child get ready for school. Use the following strategies:  Create bedtime routines so he gets 10 to 11 hours of sleep.  Offer him a healthy breakfast every morning.  Attend back-to-school night, parent-teacher events, and as many other school events as possible.  Talk with your child and child s teacher about bullies.  Talk with your child s teacher if you think your child might need extra help or tutoring.  Know that your child s teacher can help with evaluations for special help, if your child is not doing well in school.    SAFETY  The back seat is the safest place to ride in a car until your child is 13 years old.  Your child should use a belt-positioning booster seat until the vehicle s lap and shoulder belts fit.  Teach your child to swim and watch her in the water.  Use a hat, sun protection clothing, and sunscreen with SPF of 15 or higher on her exposed skin. Limit time outside when the sun is strongest (11:00 am-3:00 pm).  Provide a properly fitting helmet and safety gear for riding scooters, biking, skating, in-line skating, skiing, snowboarding, and horseback riding.  If it is necessary to keep a gun in your home, store it unloaded and locked with the ammunition locked separately from the gun.  Teach your child plans for emergencies such as a fire. Teach your child how and when to dial 911.  Teach your child how to be safe with other adults.  No adult should ask a child to keep secrets from parents.  No adult should ask to see a child s private parts.  No adult should ask a child for help with the adult s own private  This document is complete and the patient is ready for discharge. parts.        Helpful Resources:  Family Media Use Plan: www.healthychildren.org/MediaUsePlan  Smoking Quit Line: 447.929.5737 Information About Car Safety Seats: www.safercar.gov/parents  Toll-free Auto Safety Hotline: 610.914.6574  Consistent with Bright Futures: Guidelines for Health Supervision of Infants, Children, and Adolescents, 4th Edition  For more information, go to https://brightfutures.aap.org.        [facility]- Pediatric Department    If you have any questions regarding to your visit please contact:   Team Deejay:   Clinic Hours Telephone Number   ROSIBEL Taylor, CPNP  Jenna Wu PA-C, MS    Julieth Lazaro, RN  Jenni Orr,    7am - 6pm Mon - Thurs  7am - 5pm Fri [phone]    After hours and weekends, call [phone]   To make an appointment at any location anytime, please call [phone] or  [email or web address].   Pediatric Walk-in Clinic* NOT CURRENTLY AVAILABLE    [facility]   9:00am - 5:00pm  Mon-Fri  9am - 1pm Sat [phone]   [facility]gent [facility] [place]      [facility] [place]       11pm-9pm Monday - Friday   9am-5pm Saturday - Sunday    5pm-9pm Monday - Friday  9am-5pm Saturday - Sunday [phone] - [place]      [phone] [facility]   PEDIATRIC WALK-IN CLINIC IS ON HOLD AT THIS TIME WITH THE COVID PANDEMIC  Pediatric Walk-In Clinic is available for children/adolescents age 0-21 for the following symptoms:  Cough/Cold symptoms   Rashes/Itchy Skin  Sore throat    Urinary tract infection  Diarrhea    Ringworm  Ear pain    Sinus infection  Fever     Pink eye       If your provider has ordered a CT, MRI, or ultrasound for you, please call to schedule:  [facility], phone [phone]  [facility] radiology, [phone]  [facility], phone [phone]    If you need a medication refill please contact your pharmacy.   Please allow 3 business  "days for your refills to be completed.  **For ADHD medication, patient will need a follow up clinic or video visit or Evisit at least every 3 months to obtain refills.**    Use [employer] (secure email communication and access to your chart) to send your primary care provider a message or make an appointment.  Ask someone on your Team how to sign up for [facility] or call the [employer] help line at [phone]  To view your child's test results online: Log into your own [employer] account, select your child's name from the tabs on the right hand side, select \"My medical record\" and select \"Test results\"  Do you have options for a visit without coming into the clinic?  [facility] offers electronic visits (E-visits) and telephone visits for certain medical concerns as well as [facility] online.    E-visits via [facility]- generally incur a $45.00 fee  Telephone visits- These are billed based on time spent (in 10-minute increments) on the phone with your provider.   5-10 minutes $30.00 fee   11-20 minutes $59.00 fee   21-30 minutes $85.00 fee  [email or web address]-  More information and link available on [email or web address] homepage.       '  "

## 2022-02-13 NOTE — PROGRESS NOTE ADULT - ASSESSMENT
80 yo woman with a hx of HTN and ESRD presents after a fall with a left hip fracture. Patient is scheduled for an Open reduction and internal fixation of the left hip    Problem/Plan - 1:  ·  Problem: Hip fracture, left.   ·  Plan: Patient is s/p an Open reduction and internal fixation of the left hip   tolerated the procedure well  PO as tolerated  DVT and GI prophylaxis.    Problem/Plan - 2:  ·  Problem: HTN (hypertension).   ·  Plan: continue amlodipine, Hydralazine, and labetalol  will continue to monitor BP and adjust meds as needed   Renal diet.    Problem/Plan - 3:  ·  Problem: ESRD on dialysis.   ·  Plan: continue HD as per renal.    Problem/Plan - 4:  ·  Problem: Pain.   ·  Plan: Pain meds as needed  follow for oversedation  GI regime to prevent constipation.

## 2022-02-13 NOTE — DISCHARGE NOTE PROVIDER - NSDCFUADDINST_GEN_ALL_CORE_FT
Keep surgical incision/dressing clean and dry, continue weight bearing as tolerated, maintain posterior total hip precautions. Have dressing/sutures/staples removed and steri-strips applied post operative day #14 (2/26/2022)if applicable. Follow up with Dr. Cabello in his office 3-4 weeks post op. Keep surgical incision/dressing clean and dry, continue weight bearing as tolerated, maintain posterior total hip precautions.  Abduction pillow at all times in bed.  Have dressing/sutures/staples removed and steri-strips applied post operative day #14 (2/26/2022)if applicable. Follow up with Dr. Cabello in his office 3-4 weeks post op.    Dialysis Monday Wednesday, Friday.  Please follow up with your Nephrologist (call for appointment).    See your Cardiologist and Internist within 1 month for routine checkup (call for appointment).

## 2022-02-14 LAB
ANION GAP SERPL CALC-SCNC: 21 MMOL/L — HIGH (ref 5–17)
BUN SERPL-MCNC: 71 MG/DL — HIGH (ref 7–23)
CALCIUM SERPL-MCNC: 8.2 MG/DL — LOW (ref 8.4–10.5)
CHLORIDE SERPL-SCNC: 89 MMOL/L — LOW (ref 96–108)
CO2 SERPL-SCNC: 18 MMOL/L — LOW (ref 22–31)
CREAT SERPL-MCNC: 7.12 MG/DL — HIGH (ref 0.5–1.3)
GLUCOSE SERPL-MCNC: 104 MG/DL — HIGH (ref 70–99)
HCT VFR BLD CALC: 37.9 % — SIGNIFICANT CHANGE UP (ref 34.5–45)
HGB BLD-MCNC: 11.9 G/DL — SIGNIFICANT CHANGE UP (ref 11.5–15.5)
MCHC RBC-ENTMCNC: 28.3 PG — SIGNIFICANT CHANGE UP (ref 27–34)
MCHC RBC-ENTMCNC: 31.4 GM/DL — LOW (ref 32–36)
MCV RBC AUTO: 90 FL — SIGNIFICANT CHANGE UP (ref 80–100)
NRBC # BLD: 0 /100 WBCS — SIGNIFICANT CHANGE UP (ref 0–0)
PLATELET # BLD AUTO: 107 K/UL — LOW (ref 150–400)
POTASSIUM SERPL-MCNC: 4.7 MMOL/L — SIGNIFICANT CHANGE UP (ref 3.5–5.3)
POTASSIUM SERPL-SCNC: 4.7 MMOL/L — SIGNIFICANT CHANGE UP (ref 3.5–5.3)
RBC # BLD: 4.21 M/UL — SIGNIFICANT CHANGE UP (ref 3.8–5.2)
RBC # FLD: 17.2 % — HIGH (ref 10.3–14.5)
SODIUM SERPL-SCNC: 128 MMOL/L — LOW (ref 135–145)
WBC # BLD: 6.43 K/UL — SIGNIFICANT CHANGE UP (ref 3.8–10.5)
WBC # FLD AUTO: 6.43 K/UL — SIGNIFICANT CHANGE UP (ref 3.8–10.5)

## 2022-02-14 PROCEDURE — 93306 TTE W/DOPPLER COMPLETE: CPT | Mod: 26

## 2022-02-14 RX ORDER — BENZOCAINE AND MENTHOL 5; 1 G/100ML; G/100ML
1 LIQUID ORAL EVERY 6 HOURS
Refills: 0 | Status: DISCONTINUED | OUTPATIENT
Start: 2022-02-14 | End: 2022-02-22

## 2022-02-14 RX ADMIN — LOSARTAN POTASSIUM 100 MILLIGRAM(S): 100 TABLET, FILM COATED ORAL at 13:49

## 2022-02-14 RX ADMIN — Medication 650 MILLIGRAM(S): at 13:36

## 2022-02-14 RX ADMIN — SODIUM ZIRCONIUM CYCLOSILICATE 10 GRAM(S): 10 POWDER, FOR SUSPENSION ORAL at 04:13

## 2022-02-14 RX ADMIN — BENZOCAINE AND MENTHOL 1 LOZENGE: 5; 1 LIQUID ORAL at 02:35

## 2022-02-14 RX ADMIN — DEXLANSOPRAZOLE 60 MILLIGRAM(S): 30 CAPSULE, DELAYED RELEASE ORAL at 13:36

## 2022-02-14 RX ADMIN — AMLODIPINE BESYLATE 10 MILLIGRAM(S): 2.5 TABLET ORAL at 13:49

## 2022-02-14 RX ADMIN — ENOXAPARIN SODIUM 30 MILLIGRAM(S): 100 INJECTION SUBCUTANEOUS at 13:36

## 2022-02-14 RX ADMIN — Medication 650 MILLIGRAM(S): at 16:43

## 2022-02-14 RX ADMIN — SENNA PLUS 2 TABLET(S): 8.6 TABLET ORAL at 21:42

## 2022-02-14 RX ADMIN — Medication 650 MILLIGRAM(S): at 18:12

## 2022-02-14 RX ADMIN — POLYETHYLENE GLYCOL 3350 17 GRAM(S): 17 POWDER, FOR SOLUTION ORAL at 21:42

## 2022-02-14 RX ADMIN — Medication 100 MILLIGRAM(S): at 18:11

## 2022-02-14 RX ADMIN — BENZOCAINE AND MENTHOL 1 LOZENGE: 5; 1 LIQUID ORAL at 18:12

## 2022-02-14 NOTE — DIETITIAN INITIAL EVALUATION ADULT. - OTHER CALCULATIONS
Dosing wt 93.6 pounds used for calorie and protein needs calculations. Defer fluids for team per on HD

## 2022-02-14 NOTE — DIETITIAN INITIAL EVALUATION ADULT. - REASON INDICATOR FOR ASSESSMENT
Assessment warranted for BMI<19   Information obtained from medical record and ...  Pt speaks Uzbek,  used ID # Assessment warranted for BMI<19   Information obtained from medical record and pt interview   Pt speaks German,  used ID #[id number]

## 2022-02-14 NOTE — DIETITIAN INITIAL EVALUATION ADULT. - PERTINENT MEDS FT
MEDICATIONS  (STANDING):  acetaminophen     Tablet .. 650 milliGRAM(s) Oral every 6 hours  amLODIPine   Tablet 10 milliGRAM(s) Oral daily  dexlansoprazole DR 60 milliGRAM(s) Oral daily  enoxaparin Injectable 30 milliGRAM(s) SubCutaneous daily  labetalol 100 milliGRAM(s) Oral two times a day  losartan 100 milliGRAM(s) Oral daily  polyethylene glycol 3350 17 Gram(s) Oral at bedtime  senna 2 Tablet(s) Oral at bedtime    MEDICATIONS  (PRN):  benzocaine 15 mG/menthol 3.6 mG Lozenge 1 Lozenge Oral every 6 hours PRN Sore Throat  bisacodyl Suppository 10 milliGRAM(s) Rectal once PRN Constipation  magnesium hydroxide Suspension 30 milliLiter(s) Oral daily PRN Constipation  melatonin 3 milliGRAM(s) Oral at bedtime PRN Insomnia  ondansetron Injectable 4 milliGRAM(s) IV Push every 6 hours PRN Nausea and/or Vomiting  oxyCODONE    IR 2.5 milliGRAM(s) Oral every 4 hours PRN Moderate Pain (4 - 6)  oxyCODONE    IR 5 milliGRAM(s) Oral every 4 hours PRN Severe Pain (7 - 10)

## 2022-02-14 NOTE — DIETITIAN INITIAL EVALUATION ADULT. - REASON FOR ADMISSION
Pt is 80 y/o F with PMH as per chart: " HTN, ESRD on HD, anemia of chronic disease who presents s/p mech fall c/o severe left hip pain and inability to ambulate."

## 2022-02-14 NOTE — DIETITIAN INITIAL EVALUATION ADULT. - OTHER INFO
GASTROINTESTINAL:  Last BM: 2/10 ?  Bowel Regimen: Miralax, senna, dulcolax, Mg   Pt denies nausea, vomiting, diarrhea, or constipation. ?    NUTRITION STATUS:  - PO intake:   - Pt with ESRD on HD, as per nephro; noted on renal diet without protein restriction; per renal related labs:    +K 5.6 [H] 2/13, 4.7 [WNL] 2/14.   eGFR 5 [L] , BUN 71[H], Cr 7.12 [H]  Will recommend obtaining Phos levels; continue to monitor electrolytes; will recommend adding Nephro-Krystyna and Nepro supplement for additional protein and calories     WEIGHT HISTORY:  - 93.6 pounds 2/14 dosing wt  - POST HD wts as per flowsheets:    2/14 93.6 pounds    no otherpost HD wts available at this time per flowsheets   - UBW reported by pt:     EDUCATION  Provided education on renal diet, education included importance of monitoring intake of foods high in potassium/phosphorous/sodium, review of foods high in these micronutrients as well as alternatives, monitoring fluid intake, and importance of adequate protein intake due to increased demand on HD. GASTROINTESTINAL:  Last BM: 2/10 per pt report and as per flowsheets   Bowel Regimen: Miralax, senna, dulcolax, Mg   Pt denies nausea, vomiting or diarrhea    NUTRITION STATUS:  - PO intake: Pt states appetite "ok" with ~50% meal intake, sometimes "dislike the food" also c/o swallow difficulties "throat hurts" for several days; makes it difficult to eat; stated daughter has been bringing her Porridge; Consider formal swallowing evaluation with speech pathologist in setting of possible swallowing difficulty  - Pt with ESRD on HD, as per nephro; noted on renal diet without protein restriction; per renal related labs:    +K 5.6 [H] 2/13, 4.7 [WNL] 2/14.   eGFR 5 [L] , BUN 71[H], Cr 7.12 [H]  Will recommend obtaining Phos levels; continue to monitor electrolytes; will recommend adding Nephro-Krystyna and Nepro supplement for additional protein and calories     WEIGHT HISTORY:  - 93.6 pounds 2/14 dosing wt  - POST HD wts as per flowsheets:    2/14 93.6 pounds    no other post HD wts available at this time per flowsheets   - Pt states she check her wt at home, her UBW 45kg (99 pounds) last time measured before admission (noted pt's wt 46.3 kg upon admission); states may have lost wt but did not state how much; noted pt on HD wt may be impacted by fluid shifts; continue to monitor wt trends    EDUCATION  Provided education on renal diet, education included importance of monitoring intake of foods high in potassium/phosphorous/sodium, review of foods high in these micronutrients as well as alternatives, monitoring fluid intake, and importance of adequate protein intake due to increased demand on HD.

## 2022-02-14 NOTE — PROGRESS NOTE ADULT - ASSESSMENT
82y/o female with history of HTN , ESRD presents with hip pain s/p fall, found to have hip fracture. s/p L Hemiarthroplasty 02/12.     ESRD on HD ( MWF)  Outpt unit [facility]   Consent obtained for HD   s/p L Hemiarthroplasty 02/12  Pt seen on dialysis tolerating well , bp stable  Monitor BMP and BP    Hyperkalemia  HD today with 2 Kbath  Monitor K  Low k diet    Anemia  Does not tolerate Epogen/ aranesp  On Mircera 100mg Bi Montly  Last dose on 2/7   PRBC as needed to keep Hb >7.5    HTN  Bp stable  Monitor BP    CKD -BMD  Check PTH  Monitor serum calcium and PO4 daily

## 2022-02-14 NOTE — DIETITIAN INITIAL EVALUATION ADULT. - COLLABORATION WITH OTHER PROVIDERS
Consider formal swallowing evaluation with speech pathologist in setting of swallowing difficulty/gagging.

## 2022-02-14 NOTE — DIETITIAN NUTRITION RISK NOTIFICATION - TREATMENT: THE FOLLOWING DIET HAS BEEN RECOMMENDED
Diet, Renal Restrictions:   For patients receiving Renal Replacement - No Protein Restr, No Conc K, No Conc Phos, Low Sodium  Soft and Bite Sized (SOFTBTSZ)  Supplement Feeding Modality:  Oral  Nepro Cans or Servings Per Day:  1       Frequency:  Two Times a day (02-14-22 @ 15:29) [Active]  Diet, Clear Liquid (02-12-22 @ 11:27) [Available for Activation]

## 2022-02-14 NOTE — DIETITIAN INITIAL EVALUATION ADULT. - NSPROEDAABILITYLEARN_GEN_A_NUR
pt c/o difficulty talking; language barrier, translation used/communication limitations/physical limitations

## 2022-02-14 NOTE — DIETITIAN INITIAL EVALUATION ADULT. - ETIOLOGY
pt report of sore throat, chewing and swallowing difficulties inadequate protein energy intake in settings of chronic condition

## 2022-02-14 NOTE — DIETITIAN INITIAL EVALUATION ADULT. - PHYSCIAL ASSESSMENT
81% IBW  Skin: no noted pressure injuries as per flowsheets   Performed nutrition focused physical exam with pt's consent and noted xx signs of muscle/fat loss 81% IBW  Skin: no noted pressure injuries as per flowsheets   Performed nutrition focused physical exam with pt's consent and noted moderate- severe signs of muscle/fat loss

## 2022-02-14 NOTE — DIETITIAN INITIAL EVALUATION ADULT. - PERTINENT LABORATORY DATA
02-14 @ 10:58: Na 128<L>, BUN 71<H>, Cr 7.12<H>, <H>, K+ 4.7, Phos --, Mg --, Alk Phos --, ALT/SGPT --, AST/SGOT --, HbA1c --

## 2022-02-14 NOTE — DIETITIAN INITIAL EVALUATION ADULT. - DIET TYPE
supplement (specify) soft and bite-sized/renal replacement pts:no protein restr,no conc K & phos, low sodium/supplement (specify)

## 2022-02-14 NOTE — DIETITIAN INITIAL EVALUATION ADULT. - SIGNS/SYMPTOMS
<75% EER >/=1 month, +2 edema, moderate- severe muscle and fat loss findings pt consumed ~50% meals in house

## 2022-02-14 NOTE — DIETITIAN INITIAL EVALUATION ADULT. - ORAL INTAKE PTA/DIET HISTORY
Diet PTA:  Nutrition status PTA:  Nutrition Supplements PTA: Shayna-Krystyna, per H&P 2/11   No known food allergies   Pt denies difficulty chewing/ swallowing ? Diet PTA: Pt states fair PO intake PTA and not following specific diet  Nutrition status PTA: Pt c/o swallow issues/ poor dentition- not sure time frame when it started; may impacted intake PTA   Nutrition Supplements PTA: Jakob, per H&P 2/11   No known food allergies

## 2022-02-14 NOTE — PROGRESS NOTE ADULT - ASSESSMENT
ASSESSMENT/PLAN:   RACHID FRIEDMAN is a 81yFemale s/p L hip hemiarthroplasty, now POD2    - Pain control  - WBAT LLE  - PT/OT/OOB  - DVT ppx: Lovenox  - Appreciate recs per Nephro, Cardiology  - FU TTE  - Dispo: [facility]

## 2022-02-14 NOTE — PROGRESS NOTE ADULT - ASSESSMENT
82 yo woman with a hx of HTN and ESRD presents after a fall with a left hip fracture. Patient is scheduled for an Open reduction and internal fixation of the left hip    Problem/Plan - 1:  ·  Problem: Hip fracture, left.   ·  Plan: Patient is s/p an Open reduction and internal fixation of the left hip   tolerated the procedure well  PO as tolerated  DVT and GI prophylaxis.    Problem/Plan - 2:  ·  Problem: HTN (hypertension).   ·  Plan: continue amlodipine, Hydralazine, and labetalol  will continue to monitor BP and adjust meds as needed   Renal diet.    Problem/Plan - 3:  ·  Problem: ESRD on dialysis.   ·  Plan: continue HD as per renal.    Problem/Plan - 4:  ·  Problem: Pain. ·  Plan: Pain meds as needed  follow for oversedation  GI regime to prevent constipation.

## 2022-02-14 NOTE — DIETITIAN INITIAL EVALUATION ADULT. - CONTINUE CURRENT NUTRITION CARE PLAN
yes Continue Renal diet and adjust to soft bite size per pt c/o swallow/chew difficulties; recommend speech to eval/yes Continue Renal diet and adjust to soft bite size per pt c/o swallow/chew difficulties; recommend speech to eval. Continue monitoring electrolytes and adjust diet as appropriate. Will continue to monitor PO intake, weight, labs, skin, GI status, diet./yes Continue Renal diet and adjust to soft bite size per pt c/o swallow/chew difficulties; recommend speech to eval. Continue monitoring electrolytes and adjust diet as appropriate. Will continue to monitor PO intake, weight, labs, skin, GI status, diet. malnutrition Malnutrition sticker placed, RD to follow-up as per protocol/yes

## 2022-02-15 LAB
ANION GAP SERPL CALC-SCNC: 17 MMOL/L — SIGNIFICANT CHANGE UP (ref 5–17)
BUN SERPL-MCNC: 42 MG/DL — HIGH (ref 7–23)
CALCIUM SERPL-MCNC: 8.6 MG/DL — SIGNIFICANT CHANGE UP (ref 8.4–10.5)
CHLORIDE SERPL-SCNC: 96 MMOL/L — SIGNIFICANT CHANGE UP (ref 96–108)
CO2 SERPL-SCNC: 23 MMOL/L — SIGNIFICANT CHANGE UP (ref 22–31)
CREAT SERPL-MCNC: 5.33 MG/DL — HIGH (ref 0.5–1.3)
GLUCOSE SERPL-MCNC: 110 MG/DL — HIGH (ref 70–99)
HCT VFR BLD CALC: 36.6 % — SIGNIFICANT CHANGE UP (ref 34.5–45)
HGB BLD-MCNC: 11.6 G/DL — SIGNIFICANT CHANGE UP (ref 11.5–15.5)
MCHC RBC-ENTMCNC: 28.8 PG — SIGNIFICANT CHANGE UP (ref 27–34)
MCHC RBC-ENTMCNC: 31.7 GM/DL — LOW (ref 32–36)
MCV RBC AUTO: 90.8 FL — SIGNIFICANT CHANGE UP (ref 80–100)
NRBC # BLD: 0 /100 WBCS — SIGNIFICANT CHANGE UP (ref 0–0)
PLATELET # BLD AUTO: 92 K/UL — LOW (ref 150–400)
POTASSIUM SERPL-MCNC: 3.9 MMOL/L — SIGNIFICANT CHANGE UP (ref 3.5–5.3)
POTASSIUM SERPL-SCNC: 3.9 MMOL/L — SIGNIFICANT CHANGE UP (ref 3.5–5.3)
RBC # BLD: 4.03 M/UL — SIGNIFICANT CHANGE UP (ref 3.8–5.2)
RBC # FLD: 17.2 % — HIGH (ref 10.3–14.5)
SARS-COV-2 RNA SPEC QL NAA+PROBE: SIGNIFICANT CHANGE UP
SODIUM SERPL-SCNC: 136 MMOL/L — SIGNIFICANT CHANGE UP (ref 135–145)
WBC # BLD: 4.18 K/UL — SIGNIFICANT CHANGE UP (ref 3.8–10.5)
WBC # FLD AUTO: 4.18 K/UL — SIGNIFICANT CHANGE UP (ref 3.8–10.5)

## 2022-02-15 RX ORDER — NYSTATIN 500MM UNIT
500000 POWDER (EA) MISCELLANEOUS ONCE
Refills: 0 | Status: COMPLETED | OUTPATIENT
Start: 2022-02-15 | End: 2022-02-15

## 2022-02-15 RX ORDER — HEPARIN SODIUM 5000 [USP'U]/ML
5000 INJECTION INTRAVENOUS; SUBCUTANEOUS EVERY 8 HOURS
Refills: 0 | Status: DISCONTINUED | OUTPATIENT
Start: 2022-02-15 | End: 2022-02-16

## 2022-02-15 RX ORDER — NYSTATIN 500MM UNIT
500000 POWDER (EA) MISCELLANEOUS
Refills: 0 | Status: DISCONTINUED | OUTPATIENT
Start: 2022-02-15 | End: 2022-02-22

## 2022-02-15 RX ORDER — BACITRACIN ZINC 500 UNIT/G
1 OINTMENT IN PACKET (EA) TOPICAL THREE TIMES A DAY
Refills: 0 | Status: DISCONTINUED | OUTPATIENT
Start: 2022-02-15 | End: 2022-02-22

## 2022-02-15 RX ORDER — ACETAMINOPHEN 500 MG
975 TABLET ORAL EVERY 8 HOURS
Refills: 0 | Status: DISCONTINUED | OUTPATIENT
Start: 2022-02-15 | End: 2022-02-22

## 2022-02-15 RX ADMIN — DEXLANSOPRAZOLE 60 MILLIGRAM(S): 30 CAPSULE, DELAYED RELEASE ORAL at 11:19

## 2022-02-15 RX ADMIN — Medication 1 TABLET(S): at 13:53

## 2022-02-15 RX ADMIN — Medication 1 APPLICATION(S): at 21:09

## 2022-02-15 RX ADMIN — Medication 3 MILLIGRAM(S): at 21:10

## 2022-02-15 RX ADMIN — ENOXAPARIN SODIUM 30 MILLIGRAM(S): 100 INJECTION SUBCUTANEOUS at 11:19

## 2022-02-15 RX ADMIN — Medication 100 MILLIGRAM(S): at 17:15

## 2022-02-15 RX ADMIN — Medication 1 APPLICATION(S): at 13:54

## 2022-02-15 RX ADMIN — Medication 500000 UNIT(S): at 19:01

## 2022-02-15 RX ADMIN — Medication 650 MILLIGRAM(S): at 12:11

## 2022-02-15 RX ADMIN — Medication 100 MILLIGRAM(S): at 05:53

## 2022-02-15 RX ADMIN — Medication 650 MILLIGRAM(S): at 05:53

## 2022-02-15 RX ADMIN — POLYETHYLENE GLYCOL 3350 17 GRAM(S): 17 POWDER, FOR SOLUTION ORAL at 21:09

## 2022-02-15 RX ADMIN — Medication 10 MILLIGRAM(S): at 17:18

## 2022-02-15 RX ADMIN — AMLODIPINE BESYLATE 10 MILLIGRAM(S): 2.5 TABLET ORAL at 05:56

## 2022-02-15 RX ADMIN — LOSARTAN POTASSIUM 100 MILLIGRAM(S): 100 TABLET, FILM COATED ORAL at 05:53

## 2022-02-15 RX ADMIN — BENZOCAINE AND MENTHOL 1 LOZENGE: 5; 1 LIQUID ORAL at 06:07

## 2022-02-15 RX ADMIN — Medication 650 MILLIGRAM(S): at 11:19

## 2022-02-15 RX ADMIN — Medication 500000 UNIT(S): at 10:16

## 2022-02-15 RX ADMIN — SENNA PLUS 2 TABLET(S): 8.6 TABLET ORAL at 21:09

## 2022-02-15 RX ADMIN — HEPARIN SODIUM 5000 UNIT(S): 5000 INJECTION INTRAVENOUS; SUBCUTANEOUS at 21:10

## 2022-02-15 NOTE — PROGRESS NOTE ADULT - ASSESSMENT
80 yo woman with a hx of HTN and ESRD presents after a fall with a left hip fracture. Patient is scheduled for an Open reduction and internal fixation of the left hip    Problem/Plan - 1:  ·  Problem: Hip fracture, left.   ·  Plan: Patient is s/p an Open reduction and internal fixation of the left hip   tolerated the procedure well  PO as tolerated  DVT and GI prophylaxis.    Problem/Plan - 2:  ·  Problem: HTN (hypertension).   ·  Plan: continue amlodipine, Hydralazine, and labetalol  will continue to monitor BP and adjust meds as needed   Renal diet.    Problem/Plan - 3:  ·  Problem: ESRD on dialysis.   ·  Plan: continue HD as per renal.    Problem/Plan - 4:  ·  Problem: Pain. ·  Plan: Pain meds as needed  follow for oversedation  GI regime to prevent constipation. senna and miralax

## 2022-02-15 NOTE — PROGRESS NOTE ADULT - ASSESSMENT
Impression: Stable       Plan:   Continue present treatment                 Out of bed, ambulate, weight bearing as tolerated                  Physical therapy follow up                  Continue to monitor    Eliot Marsh PA-C  Orthopaedic Surgery  Team pager [phone]  uyynbs-[phone]

## 2022-02-15 NOTE — PROGRESS NOTE ADULT - ASSESSMENT
80y/o female with history of HTN , ESRD presents with hip pain s/p fall, found to have hip fracture. s/p L Hemiarthroplasty 02/12.     ESRD on HD ( MWF) via [facility]  Outpt unit [facility]   Consent obtained for HD   s/p L Hemiarthroplasty 02/12  s/p HD on 2/14 with 1.5 LUF  Continue MWF schedule at present  Monitor BMP and BP    Hyperkalemia  Monitor K  Low k diet    Anemia  Does not tolerate Epogen/ aranesp  On Mircera 100mg Bi Montly  Last dose on 2/7   PRBC as needed to keep Hb >7.5    HTN  Bp stable  Monitor BP    CKD -BMD  Check PTH  Monitor serum calcium and PO4 daily

## 2022-02-16 LAB
ANION GAP SERPL CALC-SCNC: 18 MMOL/L — HIGH (ref 5–17)
BUN SERPL-MCNC: 67 MG/DL — HIGH (ref 7–23)
CALCIUM SERPL-MCNC: 8.6 MG/DL — SIGNIFICANT CHANGE UP (ref 8.4–10.5)
CALCIUM SERPL-MCNC: 8.7 MG/DL — SIGNIFICANT CHANGE UP (ref 8.4–10.5)
CHLORIDE SERPL-SCNC: 94 MMOL/L — LOW (ref 96–108)
CO2 SERPL-SCNC: 22 MMOL/L — SIGNIFICANT CHANGE UP (ref 22–31)
CREAT SERPL-MCNC: 6.78 MG/DL — HIGH (ref 0.5–1.3)
GLUCOSE SERPL-MCNC: 107 MG/DL — HIGH (ref 70–99)
HAV IGM SER-ACNC: SIGNIFICANT CHANGE UP
HBV CORE IGM SER-ACNC: SIGNIFICANT CHANGE UP
HBV SURFACE AB SER-ACNC: REACTIVE
HBV SURFACE AG SER-ACNC: SIGNIFICANT CHANGE UP
HCT VFR BLD CALC: 35.4 % — SIGNIFICANT CHANGE UP (ref 34.5–45)
HGB BLD-MCNC: 11.2 G/DL — LOW (ref 11.5–15.5)
MCHC RBC-ENTMCNC: 28.6 PG — SIGNIFICANT CHANGE UP (ref 27–34)
MCHC RBC-ENTMCNC: 31.6 GM/DL — LOW (ref 32–36)
MCV RBC AUTO: 90.3 FL — SIGNIFICANT CHANGE UP (ref 80–100)
NRBC # BLD: 0 /100 WBCS — SIGNIFICANT CHANGE UP (ref 0–0)
PLATELET # BLD AUTO: 117 K/UL — LOW (ref 150–400)
POTASSIUM SERPL-MCNC: 4.2 MMOL/L — SIGNIFICANT CHANGE UP (ref 3.5–5.3)
POTASSIUM SERPL-SCNC: 4.2 MMOL/L — SIGNIFICANT CHANGE UP (ref 3.5–5.3)
PTH-INTACT FLD-MCNC: 948 PG/ML — HIGH (ref 15–65)
RBC # BLD: 3.92 M/UL — SIGNIFICANT CHANGE UP (ref 3.8–5.2)
RBC # FLD: 17.2 % — HIGH (ref 10.3–14.5)
SODIUM SERPL-SCNC: 134 MMOL/L — LOW (ref 135–145)
WBC # BLD: 3.6 K/UL — LOW (ref 3.8–10.5)
WBC # FLD AUTO: 3.6 K/UL — LOW (ref 3.8–10.5)

## 2022-02-16 RX ORDER — APIXABAN 2.5 MG/1
2.5 TABLET, FILM COATED ORAL
Refills: 0 | Status: DISCONTINUED | OUTPATIENT
Start: 2022-02-16 | End: 2022-02-22

## 2022-02-16 RX ADMIN — Medication 1 APPLICATION(S): at 13:53

## 2022-02-16 RX ADMIN — Medication 1 APPLICATION(S): at 21:48

## 2022-02-16 RX ADMIN — APIXABAN 2.5 MILLIGRAM(S): 2.5 TABLET, FILM COATED ORAL at 17:54

## 2022-02-16 RX ADMIN — Medication 975 MILLIGRAM(S): at 03:00

## 2022-02-16 RX ADMIN — Medication 100 MILLIGRAM(S): at 13:04

## 2022-02-16 RX ADMIN — Medication 500000 UNIT(S): at 17:55

## 2022-02-16 RX ADMIN — Medication 1 TABLET(S): at 13:53

## 2022-02-16 RX ADMIN — Medication 975 MILLIGRAM(S): at 14:57

## 2022-02-16 RX ADMIN — HEPARIN SODIUM 5000 UNIT(S): 5000 INJECTION INTRAVENOUS; SUBCUTANEOUS at 05:07

## 2022-02-16 RX ADMIN — Medication 500000 UNIT(S): at 05:07

## 2022-02-16 RX ADMIN — Medication 975 MILLIGRAM(S): at 13:57

## 2022-02-16 RX ADMIN — Medication 1 APPLICATION(S): at 05:07

## 2022-02-16 RX ADMIN — Medication 975 MILLIGRAM(S): at 02:24

## 2022-02-16 RX ADMIN — Medication 100 MILLIGRAM(S): at 17:54

## 2022-02-16 RX ADMIN — DEXLANSOPRAZOLE 60 MILLIGRAM(S): 30 CAPSULE, DELAYED RELEASE ORAL at 13:54

## 2022-02-16 RX ADMIN — LOSARTAN POTASSIUM 100 MILLIGRAM(S): 100 TABLET, FILM COATED ORAL at 13:05

## 2022-02-16 NOTE — SWALLOW BEDSIDE ASSESSMENT ADULT - SLP PERTINENT HISTORY OF CURRENT PROBLEM
81y Female with PMH for ESRD on HD MWF, HTN, presents s/p mech fall c/o severe left hip pain and inability to ambulate.  Patient denies headstrike or LOC. Patient denies radiation of pain. Patient denies has mild numbness in LLE. No other bone/joint complaints. Patient is a community ambulator at baseline with assistive device. Patient was found to have a left femoral neck fracture. She is s/p an Open reduction and internal fixation of the left femur. Patient tolerated procedure well. RD consulted on 2/14: Pt states appetite "ok" with ~50% meal intake, sometimes "dislike the food" also c/o swallow difficulties "throat hurts" for several days; makes it difficult to eat; stated daughter has been bringing her Porridge; Consider formal swallowing evaluation with speech pathologist in setting of possible swallowing difficulty

## 2022-02-16 NOTE — SWALLOW BEDSIDE ASSESSMENT ADULT - ASR SWALLOW RECOMMEND DIAG
Will defer instrumental exam at this time, as Ortho team suspects odynophagia is related to thrush, with nystatin swish/spit ordered. If complaints do not resolve, can consider MBS to further assess swallow function. Ortho team in agreement.

## 2022-02-16 NOTE — SWALLOW BEDSIDE ASSESSMENT ADULT - ASPIRATION PRECAUTIONS
Monitor for s/s aspiration/laryngeal penetration. If noted:  D/C p.o. intake, provide non-oral nutrition/hydration/meds, and contact this service @ [id number]/yes

## 2022-02-16 NOTE — SWALLOW BEDSIDE ASSESSMENT ADULT - SLP GENERAL OBSERVATIONS
Pt encountered in bed, awake, on room air. English speaking,  utilized (@[id number]). A&Ox3. Follows directives for exam. Vocal quality mildly hoarse, improves with hydration.

## 2022-02-16 NOTE — SWALLOW BEDSIDE ASSESSMENT ADULT - SWALLOW EVAL: DIAGNOSIS
Attempted to see patient for bedside swallow evaluation. Per discussion with RN, patient off the floor at dialysis. Will f/u later this date, schedule permitting. 81y Female with PMH for ESRD on HD MWF, HTN, presented after mech fall, now s/p s/p an open reduction and internal fixation of the left femur. Pt presents with odynophagia, which reportedly began after her surgery, in the absence of clinical signs of an oropharyngeal dysphagia. States that swallowing food, liquids, and her own saliva is painful; with facial grimacing during PO intake. With PO trials of purees/ thin liquids, there is adequate oral transfer and suspected timely pharyngeal swallow trigger. No overt signs of laryngeal penetration/aspiration observed across consistencies.

## 2022-02-16 NOTE — PROGRESS NOTE ADULT - ASSESSMENT
80y/o female with history of HTN , ESRD presents with hip pain s/p fall, found to have hip fracture. s/p L Hemiarthroplasty 02/12.     ESRD on HD ( MWF) via AVF  Outpt unit [facility]   Consent obtained for HD   s/p L Hemiarthroplasty 02/12  s/p HD on 2/14 with 1.5 LUF  Continue MWF schedule at present  PT seen and examined on dialysis , tolerating well. BP stable, Access working well  Monitor BMP and BP    Hyperkalemia  Monitor K  Low k diet    Anemia  Does not tolerate Epogen/ aranesp  On Mircera 100mg Bi Montly  Last dose on 2/7   PRBC as needed to keep Hb >7.5    HTN  Bp stable  Monitor BP    CKD -BMD  Check PTH  Monitor serum calcium and PO4 daily

## 2022-02-16 NOTE — PROGRESS NOTE ADULT - ASSESSMENT
80 y/o FM s/p left hip hemiarthroplast POD#4, H/D today, d/c planning  Carrie Tavarez PA-C  Orthopaedic Surgery  Team pager [phone]  [facility] [phone]  [phone]

## 2022-02-16 NOTE — SWALLOW BEDSIDE ASSESSMENT ADULT - ASR SWALLOW REFERRAL
An ENT consult is recommended to assess integrity of laryngeal structures/ evaluate for laryngeal thrush given patient's complaints./ENT

## 2022-02-17 LAB
ANION GAP SERPL CALC-SCNC: 16 MMOL/L — SIGNIFICANT CHANGE UP (ref 5–17)
BUN SERPL-MCNC: 42 MG/DL — HIGH (ref 7–23)
CALCIUM SERPL-MCNC: 9.2 MG/DL — SIGNIFICANT CHANGE UP (ref 8.4–10.5)
CHLORIDE SERPL-SCNC: 95 MMOL/L — LOW (ref 96–108)
CO2 SERPL-SCNC: 24 MMOL/L — SIGNIFICANT CHANGE UP (ref 22–31)
CREAT SERPL-MCNC: 4.76 MG/DL — HIGH (ref 0.5–1.3)
GLUCOSE SERPL-MCNC: 118 MG/DL — HIGH (ref 70–99)
HCT VFR BLD CALC: 37.8 % — SIGNIFICANT CHANGE UP (ref 34.5–45)
HGB BLD-MCNC: 12 G/DL — SIGNIFICANT CHANGE UP (ref 11.5–15.5)
MCHC RBC-ENTMCNC: 28.6 PG — SIGNIFICANT CHANGE UP (ref 27–34)
MCHC RBC-ENTMCNC: 31.7 GM/DL — LOW (ref 32–36)
MCV RBC AUTO: 90 FL — SIGNIFICANT CHANGE UP (ref 80–100)
NRBC # BLD: 0 /100 WBCS — SIGNIFICANT CHANGE UP (ref 0–0)
PLATELET # BLD AUTO: 150 K/UL — SIGNIFICANT CHANGE UP (ref 150–400)
POTASSIUM SERPL-MCNC: 4.4 MMOL/L — SIGNIFICANT CHANGE UP (ref 3.5–5.3)
POTASSIUM SERPL-SCNC: 4.4 MMOL/L — SIGNIFICANT CHANGE UP (ref 3.5–5.3)
RBC # BLD: 4.2 M/UL — SIGNIFICANT CHANGE UP (ref 3.8–5.2)
RBC # FLD: 17.1 % — HIGH (ref 10.3–14.5)
SODIUM SERPL-SCNC: 135 MMOL/L — SIGNIFICANT CHANGE UP (ref 135–145)
WBC # BLD: 3.41 K/UL — LOW (ref 3.8–10.5)
WBC # FLD AUTO: 3.41 K/UL — LOW (ref 3.8–10.5)

## 2022-02-17 RX ORDER — DOXERCALCIFEROL 2.5 UG/1
4 CAPSULE ORAL
Refills: 0 | Status: DISCONTINUED | OUTPATIENT
Start: 2022-02-17 | End: 2022-02-22

## 2022-02-17 RX ADMIN — Medication 500000 UNIT(S): at 17:25

## 2022-02-17 RX ADMIN — Medication 1 APPLICATION(S): at 21:50

## 2022-02-17 RX ADMIN — Medication 100 MILLIGRAM(S): at 05:32

## 2022-02-17 RX ADMIN — Medication 1 TABLET(S): at 12:06

## 2022-02-17 RX ADMIN — Medication 500000 UNIT(S): at 05:33

## 2022-02-17 RX ADMIN — OXYCODONE HYDROCHLORIDE 5 MILLIGRAM(S): 5 TABLET ORAL at 17:27

## 2022-02-17 RX ADMIN — LOSARTAN POTASSIUM 100 MILLIGRAM(S): 100 TABLET, FILM COATED ORAL at 05:31

## 2022-02-17 RX ADMIN — DEXLANSOPRAZOLE 60 MILLIGRAM(S): 30 CAPSULE, DELAYED RELEASE ORAL at 12:06

## 2022-02-17 RX ADMIN — APIXABAN 2.5 MILLIGRAM(S): 2.5 TABLET, FILM COATED ORAL at 05:32

## 2022-02-17 RX ADMIN — Medication 1 APPLICATION(S): at 13:12

## 2022-02-17 RX ADMIN — AMLODIPINE BESYLATE 10 MILLIGRAM(S): 2.5 TABLET ORAL at 05:32

## 2022-02-17 RX ADMIN — APIXABAN 2.5 MILLIGRAM(S): 2.5 TABLET, FILM COATED ORAL at 17:25

## 2022-02-17 RX ADMIN — Medication 1 APPLICATION(S): at 05:32

## 2022-02-17 RX ADMIN — Medication 100 MILLIGRAM(S): at 17:25

## 2022-02-17 NOTE — PROGRESS NOTE ADULT - ASSESSMENT
82y/o female with history of HTN , ESRD presents with hip pain s/p fall, found to have hip fracture. s/p L Hemiarthroplasty 02/12.     ESRD on HD ( MWF) via [facility]  Outpt unit [facility]   Consent obtained for HD   s/p L Hemiarthroplasty 02/12  s/p HD on 2/16  Continue MWF schedule at present  Monitor BMP and BP    Hyperkalemia  Monitor K  Low k diet    Anemia  Does not tolerate Epogen/ aranesp  On Mircera 100mg Bi Montly  Last dose on 2/7   PRBC as needed to keep Hb >7.5    HTN  Bp  fluctuating  Monitor BP    CKD -BMD  PTH elevatd--hectoral with HD  Monitor serum calcium and PO4 daily

## 2022-02-17 NOTE — PROGRESS NOTE ADULT - ASSESSMENT
A/p: 81yFemale s/p L hip hemiarthroplasty.  ESRD w/ dialysis MWF.  Odynophagia 2/2 ?thrush vs. Intubation?  VSS. NAD.

## 2022-02-17 NOTE — PROGRESS NOTE ADULT - ASSESSMENT
82 yo woman with a hx of HTN and ESRD presents after a fall with a left hip fracture. Patient is scheduled for an Open reduction and internal fixation of the left hip    Problem/Plan - 1:  ·  Problem: Hip fracture, left.   ·  Plan: Patient is s/p an Open reduction and internal fixation of the left hip   tolerated the procedure well  PO as tolerated  DVT and GI prophylaxis.    Problem/Plan - 2:  ·  Problem: HTN (hypertension).   ·  Plan: continue amlodipine, Hydralazine, and labetalol  will continue to monitor BP and adjust meds as needed   Renal diet.    Problem/Plan - 3:  ·  Problem: ESRD on dialysis.   ·  Plan: continue HD as per renal.    Problem/Plan - 4:  ·  Problem: Pain. ·  Plan: Pain meds as needed  follow for oversedation  GI regime to prevent constipation. senna and miralax

## 2022-02-18 ENCOUNTER — TRANSCRIPTION ENCOUNTER (OUTPATIENT)
Age: 82
End: 2022-02-18

## 2022-02-18 LAB
ANION GAP SERPL CALC-SCNC: 17 MMOL/L — SIGNIFICANT CHANGE UP (ref 5–17)
BUN SERPL-MCNC: 58 MG/DL — HIGH (ref 7–23)
CALCIUM SERPL-MCNC: 9.1 MG/DL — SIGNIFICANT CHANGE UP (ref 8.4–10.5)
CHLORIDE SERPL-SCNC: 95 MMOL/L — LOW (ref 96–108)
CO2 SERPL-SCNC: 23 MMOL/L — SIGNIFICANT CHANGE UP (ref 22–31)
CREAT SERPL-MCNC: 6.48 MG/DL — HIGH (ref 0.5–1.3)
GLUCOSE SERPL-MCNC: 121 MG/DL — HIGH (ref 70–99)
HCT VFR BLD CALC: 35.4 % — SIGNIFICANT CHANGE UP (ref 34.5–45)
HGB BLD-MCNC: 11 G/DL — LOW (ref 11.5–15.5)
MCHC RBC-ENTMCNC: 28.1 PG — SIGNIFICANT CHANGE UP (ref 27–34)
MCHC RBC-ENTMCNC: 31.1 GM/DL — LOW (ref 32–36)
MCV RBC AUTO: 90.3 FL — SIGNIFICANT CHANGE UP (ref 80–100)
NRBC # BLD: 0 /100 WBCS — SIGNIFICANT CHANGE UP (ref 0–0)
PHOSPHATE SERPL-MCNC: 5.4 MG/DL — HIGH (ref 2.5–4.5)
PLATELET # BLD AUTO: 168 K/UL — SIGNIFICANT CHANGE UP (ref 150–400)
POTASSIUM SERPL-MCNC: 4.6 MMOL/L — SIGNIFICANT CHANGE UP (ref 3.5–5.3)
POTASSIUM SERPL-SCNC: 4.6 MMOL/L — SIGNIFICANT CHANGE UP (ref 3.5–5.3)
RBC # BLD: 3.92 M/UL — SIGNIFICANT CHANGE UP (ref 3.8–5.2)
RBC # FLD: 17.3 % — HIGH (ref 10.3–14.5)
SODIUM SERPL-SCNC: 135 MMOL/L — SIGNIFICANT CHANGE UP (ref 135–145)
WBC # BLD: 3.16 K/UL — LOW (ref 3.8–10.5)
WBC # FLD AUTO: 3.16 K/UL — LOW (ref 3.8–10.5)

## 2022-02-18 RX ORDER — HYDRALAZINE HCL 50 MG
1 TABLET ORAL
Qty: 0 | Refills: 0 | DISCHARGE

## 2022-02-18 RX ORDER — OLMESARTAN MEDOXOMIL 5 MG/1
1 TABLET, FILM COATED ORAL
Qty: 0 | Refills: 0 | DISCHARGE

## 2022-02-18 RX ORDER — ACETAMINOPHEN 500 MG
3 TABLET ORAL
Qty: 0 | Refills: 0 | DISCHARGE
Start: 2022-02-18

## 2022-02-18 RX ORDER — POLYETHYLENE GLYCOL 3350 17 G/17G
17 POWDER, FOR SOLUTION ORAL
Qty: 0 | Refills: 0 | DISCHARGE
Start: 2022-02-18

## 2022-02-18 RX ORDER — OXYCODONE HYDROCHLORIDE 5 MG/1
1 TABLET ORAL
Qty: 0 | Refills: 0 | DISCHARGE
Start: 2022-02-18

## 2022-02-18 RX ORDER — ESOMEPRAZOLE MAGNESIUM 40 MG/1
1 CAPSULE, DELAYED RELEASE ORAL
Qty: 0 | Refills: 0 | DISCHARGE

## 2022-02-18 RX ORDER — HYDRALAZINE HCL 50 MG
25 TABLET ORAL THREE TIMES A DAY
Refills: 0 | Status: DISCONTINUED | OUTPATIENT
Start: 2022-02-18 | End: 2022-02-22

## 2022-02-18 RX ORDER — SUCRALFATE 1 G
1 TABLET ORAL
Qty: 0 | Refills: 0 | DISCHARGE

## 2022-02-18 RX ORDER — DOXERCALCIFEROL 2.5 UG/1
2 CAPSULE ORAL
Qty: 0 | Refills: 0 | DISCHARGE
Start: 2022-02-18

## 2022-02-18 RX ORDER — AMLODIPINE BESYLATE 2.5 MG/1
1 TABLET ORAL
Qty: 0 | Refills: 0 | DISCHARGE

## 2022-02-18 RX ORDER — APIXABAN 2.5 MG/1
1 TABLET, FILM COATED ORAL
Qty: 0 | Refills: 0 | DISCHARGE
Start: 2022-02-18

## 2022-02-18 RX ORDER — DEXLANSOPRAZOLE 30 MG/1
0 CAPSULE, DELAYED RELEASE ORAL
Qty: 0 | Refills: 0 | DISCHARGE

## 2022-02-18 RX ADMIN — Medication 500000 UNIT(S): at 07:00

## 2022-02-18 RX ADMIN — BENZOCAINE AND MENTHOL 1 LOZENGE: 5; 1 LIQUID ORAL at 21:32

## 2022-02-18 RX ADMIN — Medication 500000 UNIT(S): at 17:04

## 2022-02-18 RX ADMIN — Medication 25 MILLIGRAM(S): at 13:19

## 2022-02-18 RX ADMIN — Medication 25 MILLIGRAM(S): at 21:36

## 2022-02-18 RX ADMIN — Medication 100 MILLIGRAM(S): at 17:04

## 2022-02-18 RX ADMIN — Medication 1 APPLICATION(S): at 07:01

## 2022-02-18 RX ADMIN — Medication 975 MILLIGRAM(S): at 22:00

## 2022-02-18 RX ADMIN — DOXERCALCIFEROL 4 MICROGRAM(S): 2.5 CAPSULE ORAL at 11:05

## 2022-02-18 RX ADMIN — Medication 1 TABLET(S): at 13:19

## 2022-02-18 RX ADMIN — Medication 975 MILLIGRAM(S): at 21:33

## 2022-02-18 RX ADMIN — APIXABAN 2.5 MILLIGRAM(S): 2.5 TABLET, FILM COATED ORAL at 07:01

## 2022-02-18 RX ADMIN — DEXLANSOPRAZOLE 60 MILLIGRAM(S): 30 CAPSULE, DELAYED RELEASE ORAL at 13:19

## 2022-02-18 RX ADMIN — APIXABAN 2.5 MILLIGRAM(S): 2.5 TABLET, FILM COATED ORAL at 17:04

## 2022-02-18 NOTE — PROGRESS NOTE ADULT - ASSESSMENT
82y/o female with history of HTN , ESRD presents with hip pain s/p fall, found to have hip fracture. s/p L Hemiarthroplasty 02/12.     ESRD on HD ( MWF) via [facility]  Outpt unit [facility]   Consent obtained for HD   s/p L Hemiarthroplasty 02/12  Continue MWF schedule at present  Monitor BMP and BP  [facility]    Hyperkalemia  Monitor K  Low k diet    Anemia  Does not tolerate Epogen/ aranesp  On Mircera 100mg Bi Montly  Last dose on 2/7   PRBC as needed to keep Hb >7.5    HTN  Bp  fluctuating  Monitor BP    CKD -BMD  PTH elevatd--hectoral with HD  Monitor serum calcium and PO4 daily

## 2022-02-18 NOTE — DISCHARGE NOTE NURSING/CASE MANAGEMENT/SOCIAL WORK - PATIENT PORTAL LINK FT
You can access the FollowMyHealth Patient Portal offered by [facility] by registering at the following website: [email or web address]. By joining [email or web address] FollowMyHealth portal, you will also be able to view your health information using other applications (apps) compatible with our system.

## 2022-02-18 NOTE — DISCHARGE NOTE NURSING/CASE MANAGEMENT/SOCIAL WORK - NSDCPEFALRISK_GEN_ALL_CORE
For information on Fall & Injury Prevention, visit: [email or web address] OR  [email or web address] OR  https://www.cdc.gov/steadi/patient.html

## 2022-02-19 LAB
ANION GAP SERPL CALC-SCNC: 13 MMOL/L — SIGNIFICANT CHANGE UP (ref 5–17)
BUN SERPL-MCNC: 35 MG/DL — HIGH (ref 7–23)
CALCIUM SERPL-MCNC: 9.4 MG/DL — SIGNIFICANT CHANGE UP (ref 8.4–10.5)
CHLORIDE SERPL-SCNC: 98 MMOL/L — SIGNIFICANT CHANGE UP (ref 96–108)
CO2 SERPL-SCNC: 26 MMOL/L — SIGNIFICANT CHANGE UP (ref 22–31)
CREAT SERPL-MCNC: 4.79 MG/DL — HIGH (ref 0.5–1.3)
GLUCOSE SERPL-MCNC: 96 MG/DL — SIGNIFICANT CHANGE UP (ref 70–99)
PHOSPHATE SERPL-MCNC: 5 MG/DL — HIGH (ref 2.5–4.5)
POTASSIUM SERPL-MCNC: 4.2 MMOL/L — SIGNIFICANT CHANGE UP (ref 3.5–5.3)
POTASSIUM SERPL-SCNC: 4.2 MMOL/L — SIGNIFICANT CHANGE UP (ref 3.5–5.3)
SODIUM SERPL-SCNC: 137 MMOL/L — SIGNIFICANT CHANGE UP (ref 135–145)

## 2022-02-19 RX ADMIN — Medication 1 TABLET(S): at 11:35

## 2022-02-19 RX ADMIN — BENZOCAINE AND MENTHOL 1 LOZENGE: 5; 1 LIQUID ORAL at 21:31

## 2022-02-19 RX ADMIN — DEXLANSOPRAZOLE 60 MILLIGRAM(S): 30 CAPSULE, DELAYED RELEASE ORAL at 11:35

## 2022-02-19 RX ADMIN — Medication 500000 UNIT(S): at 17:21

## 2022-02-19 RX ADMIN — Medication 25 MILLIGRAM(S): at 13:11

## 2022-02-19 RX ADMIN — Medication 100 MILLIGRAM(S): at 17:21

## 2022-02-19 RX ADMIN — AMLODIPINE BESYLATE 10 MILLIGRAM(S): 2.5 TABLET ORAL at 05:05

## 2022-02-19 RX ADMIN — LOSARTAN POTASSIUM 100 MILLIGRAM(S): 100 TABLET, FILM COATED ORAL at 05:06

## 2022-02-19 RX ADMIN — Medication 100 MILLIGRAM(S): at 05:05

## 2022-02-19 RX ADMIN — Medication 25 MILLIGRAM(S): at 05:06

## 2022-02-19 RX ADMIN — Medication 25 MILLIGRAM(S): at 21:31

## 2022-02-19 RX ADMIN — Medication 975 MILLIGRAM(S): at 07:00

## 2022-02-19 RX ADMIN — Medication 500000 UNIT(S): at 05:05

## 2022-02-19 RX ADMIN — APIXABAN 2.5 MILLIGRAM(S): 2.5 TABLET, FILM COATED ORAL at 05:06

## 2022-02-19 RX ADMIN — APIXABAN 2.5 MILLIGRAM(S): 2.5 TABLET, FILM COATED ORAL at 17:21

## 2022-02-19 RX ADMIN — Medication 975 MILLIGRAM(S): at 06:35

## 2022-02-19 NOTE — PROGRESS NOTE ADULT - ASSESSMENT
80y/o female with history of HTN , ESRD presents with hip pain s/p fall, found to have hip fracture. s/p L Hemiarthroplasty 02/12.     ESRD on HD ( MWF) via [facility]  Outpt unit [facility]   Consent obtained for HD   s/p L Hemiarthroplasty 02/12  Continue MWF schedule at present  Monitor BMP and BP  Planned for dc to [facility]    Hyperkalemia  Monitor K  Low k diet    Anemia  Does not tolerate Epogen/ aranesp  On Mircera 100mg Bi Montly  Last dose on 2/7   PRBC as needed to keep Hb >7.5    HTN  Bp  fluctuating  Monitor BP    CKD -BMD  PTH elevatd--hectoral with HD  Monitor serum calcium and PO4 daily

## 2022-02-19 NOTE — PROGRESS NOTE ADULT - ASSESSMENT
A/p: 81y Female POD#7 s/p L Femoral Neck Fx Hemiarthroplasty  VSS. NAD.    PT/OT-WBAT  F/U AM Labs pending collection  Anterior thigh blister care as ordered  IS  DVT PPx: Eliquis 2.5mg PO BID x 14 days and SCDs  Pain Control  Continue Current Tx.  Discharge planning to [facility]    Joe Bueno PA-C  Orthopedic Surgery Team  Team Pager: #[phone]

## 2022-02-20 LAB
ANION GAP SERPL CALC-SCNC: 18 MMOL/L — HIGH (ref 5–17)
BUN SERPL-MCNC: 53 MG/DL — HIGH (ref 7–23)
CALCIUM SERPL-MCNC: 9.2 MG/DL — SIGNIFICANT CHANGE UP (ref 8.4–10.5)
CHLORIDE SERPL-SCNC: 97 MMOL/L — SIGNIFICANT CHANGE UP (ref 96–108)
CO2 SERPL-SCNC: 21 MMOL/L — LOW (ref 22–31)
CREAT SERPL-MCNC: 6.24 MG/DL — HIGH (ref 0.5–1.3)
GLUCOSE SERPL-MCNC: 131 MG/DL — HIGH (ref 70–99)
HCV RNA FLD QL NAA+PROBE: SIGNIFICANT CHANGE UP
POTASSIUM SERPL-MCNC: 4.8 MMOL/L — SIGNIFICANT CHANGE UP (ref 3.5–5.3)
POTASSIUM SERPL-SCNC: 4.8 MMOL/L — SIGNIFICANT CHANGE UP (ref 3.5–5.3)
SARS-COV-2 RNA SPEC QL NAA+PROBE: SIGNIFICANT CHANGE UP
SODIUM SERPL-SCNC: 136 MMOL/L — SIGNIFICANT CHANGE UP (ref 135–145)

## 2022-02-20 RX ADMIN — Medication 500000 UNIT(S): at 05:34

## 2022-02-20 RX ADMIN — APIXABAN 2.5 MILLIGRAM(S): 2.5 TABLET, FILM COATED ORAL at 05:34

## 2022-02-20 RX ADMIN — Medication 100 MILLIGRAM(S): at 05:34

## 2022-02-20 RX ADMIN — Medication 975 MILLIGRAM(S): at 17:31

## 2022-02-20 RX ADMIN — LOSARTAN POTASSIUM 100 MILLIGRAM(S): 100 TABLET, FILM COATED ORAL at 05:33

## 2022-02-20 RX ADMIN — Medication 25 MILLIGRAM(S): at 05:34

## 2022-02-20 RX ADMIN — Medication 1 TABLET(S): at 11:41

## 2022-02-20 RX ADMIN — Medication 25 MILLIGRAM(S): at 21:24

## 2022-02-20 RX ADMIN — AMLODIPINE BESYLATE 10 MILLIGRAM(S): 2.5 TABLET ORAL at 05:33

## 2022-02-20 RX ADMIN — Medication 3 MILLIGRAM(S): at 21:24

## 2022-02-20 RX ADMIN — Medication 500000 UNIT(S): at 17:30

## 2022-02-20 RX ADMIN — DEXLANSOPRAZOLE 60 MILLIGRAM(S): 30 CAPSULE, DELAYED RELEASE ORAL at 11:41

## 2022-02-20 RX ADMIN — Medication 25 MILLIGRAM(S): at 14:51

## 2022-02-20 RX ADMIN — Medication 975 MILLIGRAM(S): at 18:43

## 2022-02-20 RX ADMIN — Medication 100 MILLIGRAM(S): at 17:30

## 2022-02-20 RX ADMIN — APIXABAN 2.5 MILLIGRAM(S): 2.5 TABLET, FILM COATED ORAL at 17:30

## 2022-02-20 NOTE — PROGRESS NOTE ADULT - ASSESSMENT
80y/o female with history of HTN , ESRD presents with hip pain s/p fall, found to have hip fracture. s/p L Hemiarthroplasty 02/12.     ESRD on HD ( MWF) via [facility]  Outpt unit [facility]   Consent obtained for HD   s/p L Hemiarthroplasty 02/12  Continue MWF schedule at present  Monitor BMP and BP  Planned for dc [facility]    Hyperkalemia  Monitor K  Low k diet    Anemia  Does not tolerate Epogen/ aranesp  On Mircera 100mg Bi Monthly  Last dose on 2/7   PRBC as needed to keep Hb >7.5    HTN  Bp  fluctuating  Monitor BP    CKD -BMD  PTH elevatd--hectoral with HD  Monitor serum calcium and PO4 daily

## 2022-02-20 NOTE — CHART NOTE - NSCHARTNOTEFT_GEN_A_CORE
ORTHO  Patient is a 81y old  Female who presents with a chief complaint of Left Femoral Neck fracture (12 Feb 2022 10:39)    Pt. resting without complaint    VS-  T(C): 36.7 (02-12-22 @ 13:45), Max: 37.1 (02-12-22 @ 04:20)  HR: 58 (02-12-22 @ 13:45) (58 - 80)  BP: 133/59 (02-12-22 @ 13:45) (106/53 - 171/55)  RR: 17 (02-12-22 @ 13:45) (14 - 20)  SpO2: 95% (02-12-22 @ 13:45) (93% - 100%)  Wt(kg): --    M.S. Alert  Extremity- Left hip aqua cell dressing C/D/I, Abd pillow in place  Neuro-              Motor- (+)Ankle- DF/PF              Sensation- grossly intact to light touch              Calves- soft, nontender- PAS                               12.7   5.01  )-----------( 90       ( 12 Feb 2022 12:48 )             42.0     02-12    138  |  99  |  29<H>  ----------------------------<  83  4.7   |  23  |  4.78<H>    Ca    8.7      12 Feb 2022 12:48    TPro  6.5  /  Alb  4.3  /  TBili  0.7  /  DBili  x   /  AST  15  /  ALT  10  /  AlkPhos  169<H>  02-12           Impression: Stable       Plan:   Continue present treatment                 Out of bed, ambulate, weight bearing as tolerated                  Physical therapy evaluation                  Continue to monitor    Eliot Marsh PA-C  Orthopaedic Surgery  Team pager [phone]  [phone]
Post op Day [8]    Patient resting without complaints.  No chest pain, SOB, N/V.    T(C): 36.4 (02-20-22 @ 04:38), Max: 37.2 (02-19-22 @ 20:40)  HR: 87 (02-20-22 @ 04:38) (75 - 87)  BP: 159/65 (02-20-22 @ 04:38) (118/54 - 159/65)  RR: 18 (02-20-22 @ 04:38) (18 - 18)  SpO2: 93% (02-20-22 @ 04:38) (93% - 96%)      Exam:  Alert and Oriented, No Acute Distress  Cardiac: Normal S1 & S2, RRR, No murmurs, rubs or gallops appreciated.  Pulmonary: 18bpm, normal breathing effort, no retractions, diminished lung sounds appreciated.  Bronchial/Vesicular lungs sounds appreciated throughout all lung lobes.    Lower Extremities: L Hip  Hip Abduction Pillow in place  Dressing: Aquacel on incision with mild serosanguinous spotting, no change from yesterday.  Anterior distal thigh blister dressing changed with tefla and tegaderm.  Applied Bacitracin ointment on top of xeroform.  Calves Soft, Non-tender bilaterally  +PF/DF/EHL/FHL  SILT  +DP Pulse                              11.0   3.16  )-----------( 168      ( 18 Feb 2022 09:58 )             35.4    02-19    137  |  98  |  35<H>  ----------------------------<  96  4.2   |  26  |  4.79<H>    Ca    9.4      19 Feb 2022 11:08  Phos  5.0     02-19        A/p: 81y Female s/p L Femoral Knee Fx Hemiarthroplasty.  VSS. NAD.    PT/OT-WBAT LLE with posterior hip precautions.  F/U AM BMP  F/U Covid swab for discharge screening.  Appreciate Nephrology Recs  Appreciate Cardiology Recs  IS  DVT PPx: Eliquis 2.5mg PO BID x 14 days and SCDs  Pain Control  Continue Current Tx.    Joe Bueno PA-C  Orthopedic Surgery Team  Team Pager: #[phone]

## 2022-02-21 LAB
ANION GAP SERPL CALC-SCNC: 21 MMOL/L — HIGH (ref 5–17)
BUN SERPL-MCNC: 71 MG/DL — HIGH (ref 7–23)
CALCIUM SERPL-MCNC: 9 MG/DL — SIGNIFICANT CHANGE UP (ref 8.4–10.5)
CHLORIDE SERPL-SCNC: 96 MMOL/L — SIGNIFICANT CHANGE UP (ref 96–108)
CO2 SERPL-SCNC: 21 MMOL/L — LOW (ref 22–31)
CREAT SERPL-MCNC: 7.45 MG/DL — HIGH (ref 0.5–1.3)
GLUCOSE SERPL-MCNC: 109 MG/DL — HIGH (ref 70–99)
POTASSIUM SERPL-MCNC: 4.9 MMOL/L — SIGNIFICANT CHANGE UP (ref 3.5–5.3)
POTASSIUM SERPL-SCNC: 4.9 MMOL/L — SIGNIFICANT CHANGE UP (ref 3.5–5.3)
SODIUM SERPL-SCNC: 138 MMOL/L — SIGNIFICANT CHANGE UP (ref 135–145)

## 2022-02-21 RX ADMIN — Medication 500000 UNIT(S): at 17:33

## 2022-02-21 RX ADMIN — AMLODIPINE BESYLATE 10 MILLIGRAM(S): 2.5 TABLET ORAL at 05:29

## 2022-02-21 RX ADMIN — DEXLANSOPRAZOLE 60 MILLIGRAM(S): 30 CAPSULE, DELAYED RELEASE ORAL at 12:26

## 2022-02-21 RX ADMIN — Medication 25 MILLIGRAM(S): at 05:28

## 2022-02-21 RX ADMIN — Medication 1 TABLET(S): at 12:25

## 2022-02-21 RX ADMIN — APIXABAN 2.5 MILLIGRAM(S): 2.5 TABLET, FILM COATED ORAL at 05:28

## 2022-02-21 RX ADMIN — LOSARTAN POTASSIUM 100 MILLIGRAM(S): 100 TABLET, FILM COATED ORAL at 05:29

## 2022-02-21 RX ADMIN — DOXERCALCIFEROL 4 MICROGRAM(S): 2.5 CAPSULE ORAL at 09:41

## 2022-02-21 RX ADMIN — Medication 100 MILLIGRAM(S): at 17:33

## 2022-02-21 RX ADMIN — Medication 100 MILLIGRAM(S): at 05:29

## 2022-02-21 RX ADMIN — Medication 25 MILLIGRAM(S): at 13:14

## 2022-02-21 RX ADMIN — APIXABAN 2.5 MILLIGRAM(S): 2.5 TABLET, FILM COATED ORAL at 17:33

## 2022-02-21 RX ADMIN — Medication 500000 UNIT(S): at 05:28

## 2022-02-21 NOTE — PROGRESS NOTE ADULT - ASSESSMENT
Impression: Stable       Plan:   Continue present treatment                 Out of bed, ambulate, weight bearing as tolerated                 Physical therapy follow up                 Continue to monitor    Eliot Marsh PA-C  Orthopaedic Surgery  Team pager [phone]  [phone]

## 2022-02-21 NOTE — PROGRESS NOTE ADULT - ASSESSMENT
82y/o female with history of HTN , ESRD presents with hip pain s/p fall, found to have hip fracture. s/p L Hemiarthroplasty 02/12.     ESRD on HD ( MWF) via [facility]  Outpt unit [facility]   Consent obtained for HD   s/p L Hemiarthroplasty 02/12  Continue MWF schedule at present  Monitor BMP and BP  Planned for [facility]    Hyperkalemia  Monitor K  Low k diet    Anemia  Does not tolerate Epogen/ aranesp  On Mircera 100mg Bi Monthly  Last dose on 2/7   PRBC as needed to keep Hb >7.5    HTN  Bp  fluctuating  Monitor BP    CKD -BMD  PTH elevatd--hectoral with HD  Monitor serum calcium and PO4 daily

## 2022-02-22 VITALS
HEART RATE: 67 BPM | SYSTOLIC BLOOD PRESSURE: 155 MMHG | RESPIRATION RATE: 18 BRPM | TEMPERATURE: 98 F | DIASTOLIC BLOOD PRESSURE: 62 MMHG | OXYGEN SATURATION: 96 %

## 2022-02-22 PROCEDURE — 85610 PROTHROMBIN TIME: CPT

## 2022-02-22 PROCEDURE — 97166 OT EVAL MOD COMPLEX 45 MIN: CPT

## 2022-02-22 PROCEDURE — 84100 ASSAY OF PHOSPHORUS: CPT

## 2022-02-22 PROCEDURE — 85027 COMPLETE CBC AUTOMATED: CPT

## 2022-02-22 PROCEDURE — 82310 ASSAY OF CALCIUM: CPT

## 2022-02-22 PROCEDURE — 88311 DECALCIFY TISSUE: CPT

## 2022-02-22 PROCEDURE — 86709 HEPATITIS A IGM ANTIBODY: CPT

## 2022-02-22 PROCEDURE — C1769: CPT

## 2022-02-22 PROCEDURE — 86850 RBC ANTIBODY SCREEN: CPT

## 2022-02-22 PROCEDURE — 80053 COMPREHEN METABOLIC PANEL: CPT

## 2022-02-22 PROCEDURE — 85025 COMPLETE CBC W/AUTO DIFF WBC: CPT

## 2022-02-22 PROCEDURE — C1889: CPT

## 2022-02-22 PROCEDURE — 97161 PT EVAL LOW COMPLEX 20 MIN: CPT

## 2022-02-22 PROCEDURE — 92526 ORAL FUNCTION THERAPY: CPT

## 2022-02-22 PROCEDURE — 86900 BLOOD TYPING SEROLOGIC ABO: CPT

## 2022-02-22 PROCEDURE — 71045 X-RAY EXAM CHEST 1 VIEW: CPT

## 2022-02-22 PROCEDURE — 92610 EVALUATE SWALLOWING FUNCTION: CPT

## 2022-02-22 PROCEDURE — 97530 THERAPEUTIC ACTIVITIES: CPT

## 2022-02-22 PROCEDURE — 86901 BLOOD TYPING SEROLOGIC RH(D): CPT

## 2022-02-22 PROCEDURE — 93005 ELECTROCARDIOGRAM TRACING: CPT

## 2022-02-22 PROCEDURE — 97110 THERAPEUTIC EXERCISES: CPT

## 2022-02-22 PROCEDURE — 99261: CPT

## 2022-02-22 PROCEDURE — 85730 THROMBOPLASTIN TIME PARTIAL: CPT

## 2022-02-22 PROCEDURE — 72170 X-RAY EXAM OF PELVIS: CPT

## 2022-02-22 PROCEDURE — 73552 X-RAY EXAM OF FEMUR 2/>: CPT

## 2022-02-22 PROCEDURE — 36415 COLL VENOUS BLD VENIPUNCTURE: CPT

## 2022-02-22 PROCEDURE — C1776: CPT

## 2022-02-22 PROCEDURE — 97116 GAIT TRAINING THERAPY: CPT

## 2022-02-22 PROCEDURE — 83970 ASSAY OF PARATHORMONE: CPT

## 2022-02-22 PROCEDURE — U0005: CPT

## 2022-02-22 PROCEDURE — 86705 HEP B CORE ANTIBODY IGM: CPT

## 2022-02-22 PROCEDURE — 93306 TTE W/DOPPLER COMPLETE: CPT

## 2022-02-22 PROCEDURE — U0003: CPT

## 2022-02-22 PROCEDURE — 87521 HEPATITIS C PROBE&RVRS TRNSC: CPT

## 2022-02-22 PROCEDURE — 99285 EMERGENCY DEPT VISIT HI MDM: CPT | Mod: 25

## 2022-02-22 PROCEDURE — C1713: CPT

## 2022-02-22 PROCEDURE — 88305 TISSUE EXAM BY PATHOLOGIST: CPT

## 2022-02-22 PROCEDURE — 87340 HEPATITIS B SURFACE AG IA: CPT

## 2022-02-22 PROCEDURE — 96374 THER/PROPH/DIAG INJ IV PUSH: CPT

## 2022-02-22 PROCEDURE — 86706 HEP B SURFACE ANTIBODY: CPT

## 2022-02-22 PROCEDURE — 80048 BASIC METABOLIC PNL TOTAL CA: CPT

## 2022-02-22 PROCEDURE — 97535 SELF CARE MNGMENT TRAINING: CPT

## 2022-02-22 PROCEDURE — 73502 X-RAY EXAM HIP UNI 2-3 VIEWS: CPT

## 2022-02-22 RX ADMIN — Medication 25 MILLIGRAM(S): at 05:40

## 2022-02-22 RX ADMIN — APIXABAN 2.5 MILLIGRAM(S): 2.5 TABLET, FILM COATED ORAL at 05:39

## 2022-02-22 RX ADMIN — LOSARTAN POTASSIUM 100 MILLIGRAM(S): 100 TABLET, FILM COATED ORAL at 05:39

## 2022-02-22 RX ADMIN — DEXLANSOPRAZOLE 60 MILLIGRAM(S): 30 CAPSULE, DELAYED RELEASE ORAL at 12:12

## 2022-02-22 RX ADMIN — Medication 1 TABLET(S): at 12:13

## 2022-02-22 RX ADMIN — Medication 500000 UNIT(S): at 05:39

## 2022-02-22 RX ADMIN — Medication 100 MILLIGRAM(S): at 05:39

## 2022-02-22 RX ADMIN — Medication 975 MILLIGRAM(S): at 12:12

## 2022-02-22 RX ADMIN — Medication 25 MILLIGRAM(S): at 14:20

## 2022-02-22 RX ADMIN — Medication 975 MILLIGRAM(S): at 12:42

## 2022-02-22 RX ADMIN — AMLODIPINE BESYLATE 10 MILLIGRAM(S): 2.5 TABLET ORAL at 05:39

## 2022-02-22 NOTE — PROGRESS NOTE ADULT - PROBLEM SELECTOR PLAN 1
PT/OT-WBAT, post prec, abd pillow AT ALL TIMES IN BED  zflow boots for heel offloading (RN made aware)  Cepacol, then rinse and do Nystatin swish/spit  IS  DVT PPx  Pain Control  Continue Current Tx.  Consultants appreciated    Evgeny Granados PA-C  Team Pager: #[phone]
PT/OT-WBAT, post prec  IS  DVT PPx  Pain Control  Continue Current Tx.  Consultants appreciated  PARRISH oren Granados PA-C  Team Pager: #[phone]
PT/OT-WBAT, post prec, abd pillow in bed  to clarify if patient had dialysis in PM vs this AM  IS  DVT PPx  Pain Control  Continue Current Tx.  Discharge planning PARRISH Granados PA-C  Team Pager: #[phone]
***See Above  Travon DING  Orthopedics  B: [phone]  S: [phone]
Patient is s/p an Open reduction and internal fixation of the left hip   tolerated the procedure well  PO as tolerated  DVT and GI prophylaxis.

## 2022-02-22 NOTE — PROGRESS NOTE ADULT - SUBJECTIVE AND OBJECTIVE BOX
Dr. Adame  Office [phone] (9 am to 5 pm)  Service: [phone] (5pm to 9am)  Mimi DING      RENAL PROGRESS NOTE: DATE OF SERVICE 02-20-22 @ 14:42    Patient is a 81y old  Female who presents with a chief complaint of Left Femoral Neck fracture (20 Feb 2022 13:55)      Patient seen and examined at bedside. No chest pain/sob    VITALS:  T(F): 98.6 (02-20-22 @ 12:38), Max: 99 (02-19-22 @ 20:40)  HR: 82 (02-20-22 @ 12:38)  BP: 146/52 (02-20-22 @ 12:38)  RR: 18 (02-20-22 @ 12:38)  SpO2: 96% (02-20-22 @ 12:38)  Wt(kg): --    02-19 @ 07:01  -  02-20 @ 07:00  --------------------------------------------------------  IN: 1440 mL / OUT: 1 mL / NET: 1439 mL    02-20 @ 07:01  -  02-20 @ 14:42  --------------------------------------------------------  IN: 240 mL / OUT: 0 mL / NET: 240 mL          PHYSICAL EXAM:  Constitutional: NAD  Neck: No JVD  Respiratory: CTAB, no wheezes, rales or rhonchi  Cardiovascular: S1, S2, RRR  Gastrointestinal: BS+, soft, NT/ND  Extremities: No peripheral edema    Hospital Medications:   MEDICATIONS  (STANDING):  amLODIPine   Tablet 10 milliGRAM(s) Oral daily  apixaban 2.5 milliGRAM(s) Oral two times a day  BACItracin   Ointment 1 Application(s) Topical three times a day  dexlansoprazole DR 60 milliGRAM(s) Oral daily  doxercalciferol Injectable 4 MICROGram(s) IV Push <User Schedule>  hydrALAZINE 25 milliGRAM(s) Oral three times a day  labetalol 100 milliGRAM(s) Oral two times a day  losartan 100 milliGRAM(s) Oral daily  Nephro-terri 1 Tablet(s) Oral daily  nystatin    Suspension 930715 Unit(s) Swish and Swallow two times a day  polyethylene glycol 3350 17 Gram(s) Oral at bedtime  senna 2 Tablet(s) Oral at bedtime      LABS:  02-20    136  |  97  |  53<H>  ----------------------------<  131<H>  4.8   |  21<L>  |  6.24<H>    Ca    9.2      20 Feb 2022 10:18  Phos  5.0     02-19      Creatinine Trend: 6.24 <--, 4.79 <--, 6.48 <--, 4.76 <--, 6.78 <--, 5.33 <--, 7.12 <--            Urine Studies:      PTH -- (Ca 8.6)      [02-16-22 @ 15:58]   948    HBsAb Reactive      [02-16-22 @ 14:52]  HBsAg Nonreact      [02-16-22 @ 14:52]      RADIOLOGY & ADDITIONAL STUDIES:  
 ORTHO  Patient is a 81y old  Female who presents with a chief complaint of Left Femoral Neck fracture (11 Feb 2022 18:40)    Pt. resting without complaint    VS-  T(C): 37.1 (02-12-22 @ 04:20), Max: 37.1 (02-12-22 @ 04:20)  HR: 73 (02-12-22 @ 04:20) (63 - 74)  BP: 168/60 (02-12-22 @ 04:20) (132/60 - 207/63)  RR: 18 (02-12-22 @ 04:20) (18 - 20)  SpO2: 93% (02-12-22 @ 04:20) (92% - 99%)  Wt(kg): --    M.S.- Alert  Extremity- Left LE- externally rotated, shortened, moderate tenderness  Neuro-              Motor- (+) ankle- DF/PF              Sensation- grossly intact to light touch              Calves- soft, nontender- PAS                               14.1   4.72  )-----------( 106      ( 11 Feb 2022 11:42 )             45.3     02-12    137  |  95<L>  |  26<H>  ----------------------------<  94  4.4   |  26  |  4.53<H>    Ca    9.8      12 Feb 2022 04:50    TPro  6.5  /  Alb  4.3  /  TBili  0.7  /  DBili  x   /  AST  15  /  ALT  10  /  AlkPhos  169<H>  02-12         
 ORTHO  Patient is a 81y old  Female who presents with a chief complaint of Left Femoral Neck fracture (14 Feb 2022 13:21)    Pt. resting without complaint    VS-  T(C): 36.8 (02-15-22 @ 04:53), Max: 37.2 (02-14-22 @ 21:05)  HR: 81 (02-15-22 @ 04:53) (79 - 99)  BP: 151/63 (02-15-22 @ 04:53) (145/60 - 189/63)  RR: 18 (02-15-22 @ 04:53) (17 - 18)  SpO2: 94% (02-15-22 @ 04:53) (92% - 97%)  Wt(kg): --    M.S. A&O  Extremity- Left hip aqua cell dressing- C/D/I, unroofed blister 1cm x 1 cm anterior mid thigh                                     Abd pillow in place.  Neuro-              Motor- (+) Ankle- DF/PF              Sensation- grossly intact to light touch              Calves- soft, nontender- PAS                               11.9   6.43  )-----------( 107      ( 14 Feb 2022 10:58 )             37.9     02-14    128<L>  |  89<L>  |  71<H>  ----------------------------<  104<H>  4.7   |  18<L>  |  7.12<H>    Ca    8.2<L>      14 Feb 2022 10:58           
Cardiovascular Disease Progress Note    Overnight events: No acute events overnight.  Ms. Spear denies chest pain or SOB. Complains of throat soreness.   Otherwise review of systems negative    Objective Findings:  T(C): 36.6 (22 @ 08:25), Max: 36.9 (22 @ 16:04)  HR: 75 (22 @ 08:25) (74 - 87)  BP: 148/57 (22 @ 08:25) (134/83 - 175/63)  RR: 17 (22 @ 08:25) (17 - 18)  SpO2: 95% (22 @ 08:25) (93% - 97%)  Wt(kg): --  Daily     Daily Weight in k (2022 12:36)      Physical Exam:  Gen: NAD; Patient resting comfortably  HEENT: EOMI, Normocephalic/ atraumatic  CV: RRR, normal S1 + S2, no m/r/g  Lungs:  Normal respiratory effort; clear to auscultation bilaterally  Abd: soft, non-tender; bowel sounds present  Ext: No edema; warm and well perfused    Telemetry: N/a    Laboratory Data:                        11.2   3.60  )-----------( 117      ( 2022 10:14 )             35.4     02-16    134<L>  |  94<L>  |  67<H>  ----------------------------<  107<H>  4.2   |  22  |  6.78<H>    Ca    8.7      2022 10:18                Inpatient Medications:  MEDICATIONS  (STANDING):  amLODIPine   Tablet 10 milliGRAM(s) Oral daily  apixaban 2.5 milliGRAM(s) Oral two times a day  BACItracin   Ointment 1 Application(s) Topical three times a day  dexlansoprazole DR 60 milliGRAM(s) Oral daily  labetalol 100 milliGRAM(s) Oral two times a day  losartan 100 milliGRAM(s) Oral daily  Nephro-terri 1 Tablet(s) Oral daily  nystatin    Suspension [id number] Unit(s) Swish and Swallow two times a day  polyethylene glycol 3350 17 Gram(s) Oral at bedtime  senna 2 Tablet(s) Oral at bedtime      Assessment: 81y Female with pmhx of HTN, ESRD on HD, anemia of chronic disease who presents s/p mech fall c/o severe left hip pain and inability to ambulate.    Plan of Care:     #L femoral neck fracture  - S/p mechanical fall  - S/P L hip hemiarthroplasty.   - Tolerated procedure well.   - No evidence of post-op coronary ischemia  - EKG shows sinus with 1st degree block. No acute ischemic changes  - TTE shows preserved LVEF with calcification of the aortic valve and no AS or AI, dilated left atrium and a pericardial effusion with no signs of tamponade.  - DVT ppx with low dose eliquis.   - Management as per Ortho.     #HTN  -BP acceptable for age  - Resume BB, Hydralazine and Norvasc    #ESRD  - HD as per nephro    #ACP (advance care planning)-  Advanced care planning was addressed. Ms. Spear is optimized from a cardiac standpoint for discharge.   Risks, benefits and alternatives of medical treatment and procedures were discussed in detail and all questions were answered. 30 minutes spent addressing advance care plans.          Over 25 minutes spent on total encounter; more than 50% of the visit was spent counseling and/or coordinating care by the attending physician.      Adrian Denise D.O.   Cardiovascular Disease  [phone]
Cardiovascular Disease Progress Note    Overnight events: No acute events overnight.  Ms. Spear is resting in bed. Denies chest pain or SOB.   Otherwise review of systems negative    Objective Findings:  T(C): 37.2 (02-18-22 @ 04:30), Max: 37.2 (02-17-22 @ 21:06)  HR: 85 (02-18-22 @ 04:30) (72 - 85)  BP: 162/54 (02-18-22 @ 04:30) (148/57 - 171/56)  RR: 16 (02-18-22 @ 04:30) (16 - 17)  SpO2: 93% (02-18-22 @ 04:30) (93% - 97%)  Wt(kg): --  Daily     Daily       Physical Exam:  Gen: NAD; Patient resting comfortably  HEENT: EOMI, Normocephalic/ atraumatic  CV: RRR, normal S1 + S2, no m/r/g  Lungs:  Normal respiratory effort; clear to auscultation bilaterally  Abd: soft, non-tender; bowel sounds present  Ext: No edema; warm and well perfused    Telemetry: N/a    Laboratory Data:                        12.0   3.41  )-----------( 150      ( 17 Feb 2022 10:09 )             37.8     02-17    135  |  95<L>  |  42<H>  ----------------------------<  118<H>  4.4   |  24  |  4.76<H>    Ca    9.2      17 Feb 2022 10:09                Inpatient Medications:  MEDICATIONS  (STANDING):  amLODIPine   Tablet 10 milliGRAM(s) Oral daily  apixaban 2.5 milliGRAM(s) Oral two times a day  BACItracin   Ointment 1 Application(s) Topical three times a day  dexlansoprazole DR 60 milliGRAM(s) Oral daily  doxercalciferol Injectable 4 MICROGram(s) IV Push <User Schedule>  hydrALAZINE 25 milliGRAM(s) Oral three times a day  labetalol 100 milliGRAM(s) Oral two times a day  losartan 100 milliGRAM(s) Oral daily  Nephro-terri 1 Tablet(s) Oral daily  nystatin    Suspension 866325 Unit(s) Swish and Swallow two times a day  polyethylene glycol 3350 17 Gram(s) Oral at bedtime  senna 2 Tablet(s) Oral at bedtime      Assessment: 81y Female with pmhx of HTN, ESRD on HD, anemia of chronic disease who [facility] c/o severe left hip pain and inability to ambulate.    Plan of Care:     #L femoral neck fracture  - S/p mechanical fall  - S/P L hip hemiarthroplasty.   - Tolerated procedure well.   - No evidence of post-op coronary ischemia  - EKG shows sinus with 1st degree block. No acute ischemic changes  - TTE shows preserved LVEF with calcification of the aortic valve and no AS or AI, dilated left atrium and a pericardial effusion with no signs of tamponade.  - DVT ppx with low dose eliquis.   - Management as per Ortho.     #HTN  -BP elevated today  - Continue BB, Hydralazine and Norvasc  - If Systolic BP remains in 160s-170s, recommend increasing Hydralazine to 50mg TID    #ESRD  - HD as per nephro          Over 25 minutes spent on total encounter; more than 50% of the visit was spent counseling and/or coordinating care by the attending physician.      Adrian Denise D.O.   Cardiovascular Disease  [phone]
Cardiovascular Disease Progress Note    Overnight events: No acute events overnight.  Ms. Spear is resting with no complaints.   Otherwise review of systems negative    Objective Findings:  T(C): 37.1 (02-20-22 @ 09:21), Max: 37.2 (02-19-22 @ 20:40)  HR: 77 (02-20-22 @ 09:21) (75 - 87)  BP: 144/74 (02-20-22 @ 09:21) (118/54 - 159/65)  RR: 18 (02-20-22 @ 09:21) (18 - 18)  SpO2: 95% (02-20-22 @ 09:21) (93% - 96%)  Wt(kg): --  Daily     Daily       Physical Exam:  Gen: NAD; Patient resting comfortably  HEENT: EOMI, Normocephalic/ atraumatic  CV: RRR, normal S1 + S2, no m/r/g  Lungs:  Normal respiratory effort; clear to auscultation bilaterally  Abd: soft, non-tender; bowel sounds present  Ext: No edema; warm and well perfused    Telemetry: N/a    Laboratory Data:    02-20    136  |  97  |  53<H>  ----------------------------<  131<H>  4.8   |  21<L>  |  6.24<H>    Ca    9.2      20 Feb 2022 10:18  Phos  5.0     02-19                Inpatient Medications:  MEDICATIONS  (STANDING):  amLODIPine   Tablet 10 milliGRAM(s) Oral daily  apixaban 2.5 milliGRAM(s) Oral two times a day  BACItracin   Ointment 1 Application(s) Topical three times a day  dexlansoprazole DR 60 milliGRAM(s) Oral daily  doxercalciferol Injectable 4 MICROGram(s) IV Push <User Schedule>  hydrALAZINE 25 milliGRAM(s) Oral three times a day  labetalol 100 milliGRAM(s) Oral two times a day  losartan 100 milliGRAM(s) Oral daily  Nephro-terri 1 Tablet(s) Oral daily  nystatin    Suspension [id number] Unit(s) Swish and Swallow two times a day  polyethylene glycol 3350 17 Gram(s) Oral at bedtime  senna 2 Tablet(s) Oral at bedtime      Assessment: 81y Female with pmhx of HTN, ESRD on HD, anemia of chronic disease who presents s/p mech fall c/o severe left hip pain and inability to ambulate.    Plan of Care:     #L femoral neck fracture  - S/p mechanical fall  - S/P L hip hemiarthroplasty.   - Tolerated procedure well.   - No evidence of post-op coronary ischemia  - EKG shows sinus with 1st degree block. No acute ischemic changes  - TTE shows preserved LVEF with calcification of the aortic valve and no AS or AI, dilated left atrium and a pericardial effusion with no signs of tamponade.  - Np further cardiac work up warranted at this time.   - DVT ppx with low dose eliquis.   - Management as per Ortho.     #HTN  - BP acceptable for age  - Continue BB, Hydralazine and Norvasc      #ESRD  - HD as per nephro        Plan of Care:          Over 25 minutes spent on total encounter; more than 50% of the visit was spent counseling and/or coordinating care by the attending physician.      Adrian Denise D.O.   Cardiovascular Disease  [phone]
Cardiovascular Disease Progress Note    Overnight events: No acute events overnight.  Pt denies chest pain or SOB.   Otherwise review of systems negative    Objective Findings:  T(C): 36.7 (02-15-22 @ 08:52), Max: 37.2 (22 @ 21:05)  HR: 69 (02-15-22 @ 08:52) (69 - 99)  BP: 154/55 (02-15-22 @ 08:52) (145/60 - 189/63)  RR: 18 (02-15-22 @ 08:52) (18 - 18)  SpO2: 93% (02-15-22 @ 08:52) (92% - 97%)  Wt(kg): --  Daily     Daily Weight in k.5 (2022 12:05)      Physical Exam:  Gen: NAD; Patient resting comfortably  HEENT: EOMI, Normocephalic/ atraumatic  CV: RRR, normal S1 + S2, no m/r/g  Lungs:  Normal respiratory effort; clear to auscultation bilaterally  Abd: soft, non-tender; bowel sounds present  Ext: No edema; warm and well perfused    Telemetry: N/a    Laboratory Data:                        11.9   6.43  )-----------( 107      ( 2022 10:58 )             37.9     02-14    128<L>  |  89<L>  |  71<H>  ----------------------------<  104<H>  4.7   |  18<L>  |  7.12<H>    Ca    8.2<L>      2022 10:58                Inpatient Medications:  MEDICATIONS  (STANDING):  acetaminophen     Tablet .. 650 milliGRAM(s) Oral every 6 hours  amLODIPine   Tablet 10 milliGRAM(s) Oral daily  BACItracin   Ointment 1 Application(s) Topical three times a day  dexlansoprazole DR 60 milliGRAM(s) Oral daily  enoxaparin Injectable 30 milliGRAM(s) SubCutaneous daily  labetalol 100 milliGRAM(s) Oral two times a day  losartan 100 milliGRAM(s) Oral daily  Nephro-terri 1 Tablet(s) Oral daily  nystatin    Suspension 048304 Unit(s) Swish and Swallow two times a day  nystatin    Suspension 673572 Unit(s) Oral once  polyethylene glycol 3350 17 Gram(s) Oral at bedtime  senna 2 Tablet(s) Oral at bedtime      Assessment: 81y Female with pmhx of HTN, ESRD on HD, anemia of chronic disease who presents s/p mech fall c/o severe left hip pain and inability to ambulate.    Plan of Care:     #L femoral neck fracture  - S/p mechanical fall  - S/P L hip hemiarthroplasty.   - Tolerated procedure well.   - No evidence of post-op coronary ischemia  - EKG shows sinus with 1st degree block. No acute ischemic changes  - Management as per Ortho.     #HTN  -BP acceptable for age  - Resume BB, Hydralazine and Norvasc    #ESRD  - HD as per nephro        Over 25 minutes spent on total encounter; more than 50% of the visit was spent counseling and/or coordinating care by the attending physician.      Adrian Denise D.O.   Cardiovascular Disease  [phone]
DATE OF SERVICE: 02-15-22 @ 21:08    Patient is a 81y old  Female who presents with a chief complaint of Left Femoral Neck fracture (15 Feb 2022 09:56)      SUBJECTIVE / OVERNIGHT EVENTS:  No chest pain. No shortness of breath. No complaints. No events overnight.     MEDICATIONS  (STANDING):  amLODIPine   Tablet 10 milliGRAM(s) Oral daily  BACItracin   Ointment 1 Application(s) Topical three times a day  dexlansoprazole DR 60 milliGRAM(s) Oral daily  heparin   Injectable 5000 Unit(s) SubCutaneous every 8 hours  labetalol 100 milliGRAM(s) Oral two times a day  losartan 100 milliGRAM(s) Oral daily  Nephro-terri 1 Tablet(s) Oral daily  nystatin    Suspension 755743 Unit(s) Swish and Swallow two times a day  polyethylene glycol 3350 17 Gram(s) Oral at bedtime  senna 2 Tablet(s) Oral at bedtime    MEDICATIONS  (PRN):  benzocaine 15 mG/menthol 3.6 mG Lozenge 1 Lozenge Oral every 6 hours PRN Sore Throat  magnesium hydroxide Suspension 30 milliLiter(s) Oral daily PRN Constipation  melatonin 3 milliGRAM(s) Oral at bedtime PRN Insomnia  ondansetron Injectable 4 milliGRAM(s) IV Push every 6 hours PRN Nausea and/or Vomiting  oxyCODONE    IR 2.5 milliGRAM(s) Oral every 4 hours PRN Moderate Pain (4 - 6)  oxyCODONE    IR 5 milliGRAM(s) Oral every 4 hours PRN Severe Pain (7 - 10)      Vital Signs Last 24 Hrs  T(C): 36.7 (15 Feb 2022 16:12), Max: 36.8 (15 Feb 2022 04:53)  T(F): 98 (15 Feb 2022 16:12), Max: 98.2 (15 Feb 2022 04:53)  HR: 76 (15 Feb 2022 16:12) (69 - 81)  BP: 154/56 (15 Feb 2022 16:12) (151/63 - 154/56)  BP(mean): --  RR: 18 (15 Feb 2022 16:12) (18 - 18)  SpO2: 96% (15 Feb 2022 16:12) (93% - 96%)  CAPILLARY BLOOD GLUCOSE        I&O's Summary    14 Feb 2022 07:01  -  15 Feb 2022 07:00  --------------------------------------------------------  IN: 660 mL / OUT: 1500 mL / NET: -840 mL    15 Feb 2022 07:01  -  15 Feb 2022 21:08  --------------------------------------------------------  IN: 450 mL / OUT: 0 mL / NET: 450 mL        PHYSICAL EXAM:  GENERAL: NAD, well-developed  HEAD:  Atraumatic, Normocephalic  EYES: EOMI, PERRLA, conjunctiva and sclera clear  NECK: Supple, No JVD  CHEST/LUNG: Clear to auscultation bilaterally; No wheeze  HEART: Regular rate and rhythm; No murmurs, rubs, or gallops  ABDOMEN: Soft, Nontender, Nondistended; Bowel sounds present  EXTREMITIES:  2+ Peripheral Pulses, No clubbing, cyanosis, or edema  PSYCH: AAOx3  NEUROLOGY: non-focal  SKIN: No rashes or lesions    LABS:                        11.6   4.18  )-----------( 92       ( 15 Feb 2022 09:57 )             36.6     02-15    136  |  96  |  42<H>  ----------------------------<  110<H>  3.9   |  23  |  5.33<H>    Ca    8.6      15 Feb 2022 09:57                RADIOLOGY & ADDITIONAL TESTS:    Imaging Personally Reviewed:    Consultant(s) Notes Reviewed:      Care Discussed with Consultants/Other Providers:  
DATE OF SERVICE: 02-19-22 @ 08:56    Patient is a 81y old  Female who presents with a chief complaint of Left Femoral Neck fracture (18 Feb 2022 15:05)      SUBJECTIVE / OVERNIGHT EVENTS:  No chest pain. No shortness of breath. No complaints. No events overnight.     MEDICATIONS  (STANDING):  amLODIPine   Tablet 10 milliGRAM(s) Oral daily  apixaban 2.5 milliGRAM(s) Oral two times a day  BACItracin   Ointment 1 Application(s) Topical three times a day  dexlansoprazole DR 60 milliGRAM(s) Oral daily  doxercalciferol Injectable 4 MICROGram(s) IV Push <User Schedule>  hydrALAZINE 25 milliGRAM(s) Oral three times a day  labetalol 100 milliGRAM(s) Oral two times a day  losartan 100 milliGRAM(s) Oral daily  Nephro-terri 1 Tablet(s) Oral daily  nystatin    Suspension 923025 Unit(s) Swish and Swallow two times a day  polyethylene glycol 3350 17 Gram(s) Oral at bedtime  senna 2 Tablet(s) Oral at bedtime    MEDICATIONS  (PRN):  acetaminophen     Tablet .. 975 milliGRAM(s) Oral every 8 hours PRN Temp greater or equal to 38C (100.4F), Mild Pain (1 - 3)  benzocaine 15 mG/menthol 3.6 mG Lozenge 1 Lozenge Oral every 6 hours PRN Sore Throat  magnesium hydroxide Suspension 30 milliLiter(s) Oral daily PRN Constipation  melatonin 3 milliGRAM(s) Oral at bedtime PRN Insomnia  ondansetron Injectable 4 milliGRAM(s) IV Push every 6 hours PRN Nausea and/or Vomiting  oxyCODONE    IR 2.5 milliGRAM(s) Oral every 4 hours PRN Moderate Pain (4 - 6)  oxyCODONE    IR 5 milliGRAM(s) Oral every 4 hours PRN Severe Pain (7 - 10)      Vital Signs Last 24 Hrs  T(C): 36.6 (19 Feb 2022 04:51), Max: 37.1 (19 Feb 2022 00:38)  T(F): 97.9 (19 Feb 2022 04:51), Max: 98.7 (19 Feb 2022 00:38)  HR: 76 (19 Feb 2022 04:51) (76 - 90)  BP: 150/54 (19 Feb 2022 04:51) (150/54 - 180/63)  BP(mean): --  RR: 18 (19 Feb 2022 04:51) (18 - 18)  SpO2: 93% (19 Feb 2022 04:51) (93% - 98%)  CAPILLARY BLOOD GLUCOSE        I&O's Summary    18 Feb 2022 07:01  -  19 Feb 2022 07:00  --------------------------------------------------------  IN: 950 mL / OUT: 1500 mL / NET: -550 mL        PHYSICAL EXAM:  GENERAL: NAD, well-developed  HEAD:  Atraumatic, Normocephalic  EYES: EOMI, PERRLA, conjunctiva and sclera clear  NECK: Supple, No JVD  CHEST/LUNG: Clear to auscultation bilaterally; No wheeze  HEART: Regular rate and rhythm; No murmurs, rubs, or gallops  ABDOMEN: Soft, Nontender, Nondistended; Bowel sounds present  EXTREMITIES:  2+ Peripheral Pulses, No clubbing, cyanosis, or edema  PSYCH: AAOx3  NEUROLOGY: non-focal  SKIN: No rashes or lesions    LABS:                        11.0   3.16  )-----------( 168      ( 18 Feb 2022 09:58 )             35.4     02-18    135  |  95<L>  |  58<H>  ----------------------------<  121<H>  4.6   |  23  |  6.48<H>    Ca    9.1      18 Feb 2022 09:58  Phos  5.4     02-18                RADIOLOGY & ADDITIONAL TESTS:    Imaging Personally Reviewed:    Consultant(s) Notes Reviewed:      Care Discussed with Consultants/Other Providers:  
DATE OF SERVICE: 02-22-22 @ 11:04    Patient is a 81y old  Female who presents with a chief complaint of Left Femoral Neck fracture (22 Feb 2022 09:08)      SUBJECTIVE / OVERNIGHT EVENTS:  No chest pain. No shortness of breath. No complaints. No events overnight.     MEDICATIONS  (STANDING):  amLODIPine   Tablet 10 milliGRAM(s) Oral daily  apixaban 2.5 milliGRAM(s) Oral two times a day  BACItracin   Ointment 1 Application(s) Topical three times a day  dexlansoprazole DR 60 milliGRAM(s) Oral daily  doxercalciferol Injectable 4 MICROGram(s) IV Push <User Schedule>  hydrALAZINE 25 milliGRAM(s) Oral three times a day  labetalol 100 milliGRAM(s) Oral two times a day  losartan 100 milliGRAM(s) Oral daily  Nephro-terri 1 Tablet(s) Oral daily  nystatin    Suspension 745950 Unit(s) Swish and Swallow two times a day  polyethylene glycol 3350 17 Gram(s) Oral at bedtime  senna 2 Tablet(s) Oral at bedtime    MEDICATIONS  (PRN):  acetaminophen     Tablet .. 975 milliGRAM(s) Oral every 8 hours PRN Temp greater or equal to 38C (100.4F), Mild Pain (1 - 3)  benzocaine 15 mG/menthol 3.6 mG Lozenge 1 Lozenge Oral every 6 hours PRN Sore Throat  magnesium hydroxide Suspension 30 milliLiter(s) Oral daily PRN Constipation  melatonin 3 milliGRAM(s) Oral at bedtime PRN Insomnia  ondansetron Injectable 4 milliGRAM(s) IV Push every 6 hours PRN Nausea and/or Vomiting      Vital Signs Last 24 Hrs  T(C): 36.1 (22 Feb 2022 09:06), Max: 36.7 (21 Feb 2022 16:10)  T(F): 96.9 (22 Feb 2022 09:06), Max: 98 (21 Feb 2022 16:10)  HR: 63 (22 Feb 2022 09:06) (63 - 78)  BP: 117/55 (22 Feb 2022 09:06) (108/49 - 145/58)  BP(mean): --  RR: 18 (22 Feb 2022 09:06) (18 - 18)  SpO2: 94% (22 Feb 2022 09:06) (93% - 97%)  CAPILLARY BLOOD GLUCOSE        I&O's Summary    21 Feb 2022 07:01  -  22 Feb 2022 07:00  --------------------------------------------------------  IN: 700 mL / OUT: 1940 mL / NET: -1240 mL    22 Feb 2022 07:01  -  22 Feb 2022 11:04  --------------------------------------------------------  IN: 300 mL / OUT: 0 mL / NET: 300 mL        PHYSICAL EXAM:  GENERAL: NAD, well-developed  HEAD:  Atraumatic, Normocephalic  EYES: EOMI, PERRLA, conjunctiva and sclera clear  NECK: Supple, No JVD  CHEST/LUNG: Clear to auscultation bilaterally; No wheeze  HEART: Regular rate and rhythm; No murmurs, rubs, or gallops  ABDOMEN: Soft, Nontender, Nondistended; Bowel sounds present  EXTREMITIES:  2+ Peripheral Pulses, No clubbing, cyanosis, or edema  PSYCH: AAOx3  NEUROLOGY: non-focal  SKIN: No rashes or lesions    LABS:    02-21    138  |  96  |  71<H>  ----------------------------<  109<H>  4.9   |  21<L>  |  7.45<H>    Ca    9.0      21 Feb 2022 13:59                RADIOLOGY & ADDITIONAL TESTS:    Imaging Personally Reviewed:    Consultant(s) Notes Reviewed:      Care Discussed with Consultants/Other Providers:  
Post op Day [ 6]    Patient resting without complaints.  Odynophagia improved per patient.  No chest pain, SOB, N/V.    T(C): 37.2 (02-18-22 @ 04:30), Max: 37.2 (02-17-22 @ 21:06)  HR: 85 (02-18-22 @ 04:30) (72 - 85)  BP: 153/53 (02-17-22 @ 21:06) (148/57 - 171/56)  RR: 16 (02-18-22 @ 04:30) (16 - 17)  SpO2: 93% (02-18-22 @ 04:30) (93% - 97%)  Wt(kg): --    Exam:  Alert and Oriented, No Acute Distress  L Hip aquacel mild serosang drainage, soft/compressible compartments  hip abd pillow in place  excoriations to L thigh, covered with xeroform, non-occlusive and tegaderm  Calves Soft, Non-tender bilaterally  +PF/DF/EHL/FHL  SILT  +DP Pulse/PT pulse dopperable, foot warm, well-perfused                          12.0   3.41  )-----------( 150      ( 17 Feb 2022 10:09 )             37.8    02-17    135  |  95<L>  |  42<H>  ----------------------------<  118<H>  4.4   |  24  |  4.76<H>    Ca    9.2      17 Feb 2022 10:09      
[facility] [place] [facility]   MD MODESTO HENSON MD RUORU WONG, PA    TEL:  FROM 9 AM to 5 PM ---OFFICE: [phone]    FROM 5 PM - 9 AM PLEASE CALL ANSWERING SERVICE: [phone]    RENAL FOLLOW UP NOTE--Date of Service 02-17-22 @ 11:36  --------------------------------------------------------------------------------  HPI:      Pt seen and examined at bedside.   Essence SOB, chest pain     PAST HISTORY  --------------------------------------------------------------------------------  No significant changes to PMH, PSH, FHx, SHx, unless otherwise noted    ALLERGIES & MEDICATIONS  --------------------------------------------------------------------------------  Allergies    alcohol swabs (Unknown)  IV contrast (Urticaria)  No Known Drug Allergies    Intolerances    adhesives (Rash)  alcohol wipes (Rash)    Standing Inpatient Medications  amLODIPine   Tablet 10 milliGRAM(s) Oral daily  apixaban 2.5 milliGRAM(s) Oral two times a day  BACItracin   Ointment 1 Application(s) Topical three times a day  dexlansoprazole DR 60 milliGRAM(s) Oral daily  labetalol 100 milliGRAM(s) Oral two times a day  losartan 100 milliGRAM(s) Oral daily  Nephro-terri 1 Tablet(s) Oral daily  nystatin    Suspension 034485 Unit(s) Swish and Swallow two times a day  polyethylene glycol 3350 17 Gram(s) Oral at bedtime  senna 2 Tablet(s) Oral at bedtime    PRN Inpatient Medications  acetaminophen     Tablet .. 975 milliGRAM(s) Oral every 8 hours PRN  benzocaine 15 mG/menthol 3.6 mG Lozenge 1 Lozenge Oral every 6 hours PRN  magnesium hydroxide Suspension 30 milliLiter(s) Oral daily PRN  melatonin 3 milliGRAM(s) Oral at bedtime PRN  ondansetron Injectable 4 milliGRAM(s) IV Push every 6 hours PRN  oxyCODONE    IR 2.5 milliGRAM(s) Oral every 4 hours PRN  oxyCODONE    IR 5 milliGRAM(s) Oral every 4 hours PRN      REVIEW OF SYSTEMS  --------------------------------------------------------------------------------  General: no fever  CVS: no chest pain  RESP: no sob, no cough  ABD: no abdominal pain  : no dysuria,  MSK: no edema     VITALS/PHYSICAL EXAM  --------------------------------------------------------------------------------  T(C): 36.6 (02-17-22 @ 08:25), Max: 36.9 (02-16-22 @ 16:04)  HR: 75 (02-17-22 @ 08:25) (74 - 87)  BP: 148/57 (02-17-22 @ 08:25) (134/83 - 175/63)  RR: 17 (02-17-22 @ 08:25) (17 - 18)  SpO2: 95% (02-17-22 @ 08:25) (93% - 97%)  Wt(kg): --        02-16-22 @ 07:01  -  02-17-22 @ 07:00  --------------------------------------------------------  IN: 780 mL / OUT: 1500 mL / NET: -720 mL    02-17-22 @ 07:01  -  02-17-22 @ 11:36  --------------------------------------------------------  IN: 240 mL / OUT: 0 mL / NET: 240 mL      Physical Exam:  	Gen: NAD  	HEENT: GAGE  	Pulm: CTA B/L  	CV: S1S2  	Abd: Soft, +BS  	Ext: No LE edema B/L                      Neuro: Awake   	Skin: Warm and Dry   	Vascular access: avf          JORDAN mesa  LABS/STUDIES  --------------------------------------------------------------------------------              12.0   3.41  >-----------<  150      [02-17-22 @ 10:09]              37.8     135  |  95  |  42  ----------------------------<  118      [02-17-22 @ 10:09]  4.4   |  24  |  4.76        Ca     9.2     [02-17-22 @ 10:09]            Creatinine Trend:  SCr 4.76 [02-17 @ 10:09]  SCr 6.78 [02-16 @ 10:18]  SCr 5.33 [02-15 @ 09:57]  SCr 7.12 [02-14 @ 10:58]  SCr 5.89 [02-13 @ 07:38]        PTH -- (Ca 8.6)      [02-16-22 @ 15:58]   948    HBsAb Reactive      [02-16-22 @ 14:52]  HBsAg Nonreact      [02-16-22 @ 14:52]    
  Dr. Adame  Office [phone] (9 am to 5 pm)  Service: [phone] (5pm to 9am)  Mimi DING      RENAL PROGRESS NOTE: DATE OF SERVICE 02-19-22 @ 17:03    Patient is a 81y old  Female who presents with a chief complaint of Left Femoral Neck fracture (19 Feb 2022 11:33)      Patient seen and examined at bedside. No chest pain/sob    VITALS:  T(F): 98 (02-19-22 @ 16:35), Max: 98.7 (02-19-22 @ 00:38)  HR: 84 (02-19-22 @ 16:35)  BP: 137/70 (02-19-22 @ 16:35)  RR: 18 (02-19-22 @ 16:35)  SpO2: 94% (02-19-22 @ 16:35)  Wt(kg): --    02-18 @ 07:01  -  02-19 @ 07:00  --------------------------------------------------------  IN: 950 mL / OUT: 1500 mL / NET: -550 mL    02-19 @ 07:01  -  02-19 @ 17:03  --------------------------------------------------------  IN: 480 mL / OUT: 1 mL / NET: 479 mL          PHYSICAL EXAM:  Constitutional: NAD  Neck: No JVD  Respiratory: CTAB, no wheezes, rales or rhonchi  Cardiovascular: S1, S2, RRR  Gastrointestinal: BS+, soft, NT/ND  Extremities: No peripheral edema    Hospital Medications:   MEDICATIONS  (STANDING):  amLODIPine   Tablet 10 milliGRAM(s) Oral daily  apixaban 2.5 milliGRAM(s) Oral two times a day  BACItracin   Ointment 1 Application(s) Topical three times a day  dexlansoprazole DR 60 milliGRAM(s) Oral daily  doxercalciferol Injectable 4 MICROGram(s) IV Push <User Schedule>  hydrALAZINE 25 milliGRAM(s) Oral three times a day  labetalol 100 milliGRAM(s) Oral two times a day  losartan 100 milliGRAM(s) Oral daily  Nephro-terri 1 Tablet(s) Oral daily  nystatin    Suspension 536149 Unit(s) Swish and Swallow two times a day  polyethylene glycol 3350 17 Gram(s) Oral at bedtime  senna 2 Tablet(s) Oral at bedtime      LABS:  02-19    137  |  98  |  35<H>  ----------------------------<  96  4.2   |  26  |  4.79<H>    Ca    9.4      19 Feb 2022 11:08  Phos  5.0     02-19      Creatinine Trend: 4.79 <--, 6.48 <--, 4.76 <--, 6.78 <--, 5.33 <--, 7.12 <--, 5.89 <--    Phosphorus Level, Serum: 5.0 mg/dL (02-19 @ 11:08)                              11.0   3.16  )-----------( 168      ( 18 Feb 2022 09:58 )             35.4     Urine Studies:      PTH -- (Ca 8.6)      [02-16-22 @ 15:58]   948    HBsAb Reactive      [02-16-22 @ 14:52]  HBsAg Nonreact      [02-16-22 @ 14:52]      RADIOLOGY & ADDITIONAL STUDIES:  
Cardiovascular Disease Progress Note    Overnight events: No acute events overnight.  Pt currently receiving HD.   Otherwise review of systems negative    Objective Findings:  T(C): 36.4 (22 @ 09:32), Max: 36.8 (02-15-22 @ 20:35)  HR: 79 (22 @ 09:32) (66 - 81)  BP: 142/52 (22 @ 09:32) (135/70 - 158/57)  RR: 18 (22 @ 09:32) (18 - 18)  SpO2: 95% (22 @ 09:32) (95% - 97%)  Wt(kg): --  Daily     Daily Weight in k.5 (2022 09:32)      Physical Exam:  Gen: NAD; Patient resting comfortably  HEENT: EOMI, Normocephalic/ atraumatic  CV: RRR, normal S1 + S2, no m/r/g  Lungs:  Normal respiratory effort; clear to auscultation bilaterally  Abd: soft, non-tender; bowel sounds present  Ext: No edema; warm and well perfused    Telemetry: N/a    Laboratory Data:                        11.2   3.60  )-----------( 117      ( 2022 10:14 )             35.4     02-16    134<L>  |  94<L>  |  67<H>  ----------------------------<  107<H>  4.2   |  22  |  6.78<H>    Ca    8.7      2022 10:18                Inpatient Medications:  MEDICATIONS  (STANDING):  amLODIPine   Tablet 10 milliGRAM(s) Oral daily  apixaban 2.5 milliGRAM(s) Oral two times a day  BACItracin   Ointment 1 Application(s) Topical three times a day  dexlansoprazole DR 60 milliGRAM(s) Oral daily  labetalol 100 milliGRAM(s) Oral two times a day  losartan 100 milliGRAM(s) Oral daily  Nephro-terri 1 Tablet(s) Oral daily  nystatin    Suspension 571766 Unit(s) Swish and Swallow two times a day  polyethylene glycol 3350 17 Gram(s) Oral at bedtime  senna 2 Tablet(s) Oral at bedtime      Assessment: 81y Female with pmhx of HTN, ESRD on HD, anemia of chronic disease who [facility] c/o severe left hip pain and inability to ambulate.    Plan of Care:     #L femoral neck fracture  - S/p mechanical fall  - S/P L hip hemiarthroplasty.   - Tolerated procedure well.   - No evidence of post-op coronary ischemia  - EKG shows sinus with 1st degree block. No acute ischemic changes  - Management as per Ortho.     #HTN  -BP acceptable for age  - Resume BB, Hydralazine and Norvasc    #ESRD  - HD as per nephro          Over 25 minutes spent on total encounter; more than 50% of the visit was spent counseling and/or coordinating care by the attending physician.      Adrian Denise D.O.   Cardiovascular Disease  [phone]
Cardiovascular Disease Progress Note    Overnight events: No acute events overnight.  Pt denies chest pain or SOB.   Otherwise review of systems negative    Objective Findings:  T(C): 36.6 (22 @ 04:57), Max: 36.7 (22 @ 16:10)  HR: 64 (22 @ 04:57) (64 - 78)  BP: 137/56 (22 @ 04:57) (108/49 - 145/58)  RR: 18 (22 @ 04:57) (18 - 18)  SpO2: 93% (22 @ 04:57) (93% - 97%)  Wt(kg): --  Daily     Daily Weight in k.5 (2022 11:50)      Physical Exam:  Gen: NAD; Patient resting comfortably  HEENT: EOMI, Normocephalic/ atraumatic  CV: RRR, normal S1 + S2, no m/r/g  Lungs:  Normal respiratory effort; clear to auscultation bilaterally  Abd: soft, non-tender; bowel sounds present  Ext: No edema; warm and well perfused    Telemetry: N/a    Laboratory Data:        138  |  96  |  71<H>  ----------------------------<  109<H>  4.9   |  21<L>  |  7.45<H>    Ca    9.0      2022 13:59                Inpatient Medications:  MEDICATIONS  (STANDING):  amLODIPine   Tablet 10 milliGRAM(s) Oral daily  apixaban 2.5 milliGRAM(s) Oral two times a day  BACItracin   Ointment 1 Application(s) Topical three times a day  dexlansoprazole DR 60 milliGRAM(s) Oral daily  doxercalciferol Injectable 4 MICROGram(s) IV Push <User Schedule>  hydrALAZINE 25 milliGRAM(s) Oral three times a day  labetalol 100 milliGRAM(s) Oral two times a day  losartan 100 milliGRAM(s) Oral daily  Nephro-terri 1 Tablet(s) Oral daily  nystatin    Suspension 349378 Unit(s) Swish and Swallow two times a day  polyethylene glycol 3350 17 Gram(s) Oral at bedtime  senna 2 Tablet(s) Oral at bedtime      Assessment: 81y Female with pmhx of HTN, ESRD on HD, anemia of chronic disease who presents s/p [facility] fall c/o severe left hip pain and inability to ambulate.    Plan of Care:     #L femoral neck fracture  - S/P L hip hemiarthroplasty.   - No evidence of post-op coronary ischemia  - TTE shows preserved LVEF with calcification of the aortic valve and no AS or AI, dilated left atrium and a pericardial effusion with no signs of tamponade.  - No further cardiac work up warranted at this time.   - DVT ppx with low dose eliquis.   - Management as per Ortho.     #HTN  - BP acceptable for age  - Continue BB, Hydralazine and Norvasc      #ESRD  - HD as per nephro          Plan of Care:          Over 25 minutes spent on total encounter; more than 50% of the visit was spent counseling and/or coordinating care by the attending physician.      Adrian Denise D.O.   Cardiovascular Disease  [phone]
Cardiovascular Disease Progress Note    Overnight events: No acute events overnight.  Pt resting in bed comfortably. POD #1 from L hemiarthroplasty.   Otherwise review of systems negative    Objective Findings:  T(C): 36.9 (02-13-22 @ 09:32), Max: 36.9 (02-13-22 @ 09:32)  HR: 67 (02-13-22 @ 09:32) (58 - 69)  BP: 133/49 (02-13-22 @ 09:32) (106/53 - 158/65)  RR: 18 (02-13-22 @ 09:32) (14 - 18)  SpO2: 95% (02-13-22 @ 09:32) (93% - 100%)  Wt(kg): --  Daily     Daily       Physical Exam:  Gen: NAD; Patient resting comfortably  HEENT: EOMI, Normocephalic/ atraumatic  CV: RRR, normal S1 + S2, no m/r/g  Lungs:  Normal respiratory effort; clear to auscultation bilaterally  Abd: soft, non-tender; bowel sounds present  Ext: No edema; warm and well perfused. Dressing C/D/I    Telemetry: n/a    Laboratory Data:                        12.8   5.29  )-----------( 103      ( 13 Feb 2022 07:38 )             41.2     02-13    135  |  93<L>  |  49<H>  ----------------------------<  114<H>  5.6<H>   |  22  |  5.89<H>    Ca    9.3      13 Feb 2022 07:38    TPro  6.5  /  Alb  4.3  /  TBili  0.7  /  DBili  x   /  AST  15  /  ALT  10  /  AlkPhos  169<H>  02-12    PT/INR - ( 12 Feb 2022 04:50 )   PT: 11.0 sec;   INR: 0.91 ratio         PTT - ( 12 Feb 2022 04:50 )  PTT:36.2 sec          Inpatient Medications:  MEDICATIONS  (STANDING):  acetaminophen     Tablet .. 650 milliGRAM(s) Oral every 6 hours  amLODIPine   Tablet 10 milliGRAM(s) Oral daily  enoxaparin Injectable 30 milliGRAM(s) SubCutaneous daily  hydrALAZINE 25 milliGRAM(s) Oral four times a day  labetalol 100 milliGRAM(s) Oral two times a day  lactated ringers Bolus 500 milliLiter(s) IV Bolus once  losartan 100 milliGRAM(s) Oral daily  pantoprazole    Tablet 40 milliGRAM(s) Oral before breakfast  polyethylene glycol 3350 17 Gram(s) Oral at bedtime  senna 2 Tablet(s) Oral at bedtime  sodium zirconium cyclosilicate 10 Gram(s) Oral two times a day  sucralfate 1 Gram(s) Oral four times a day      Assessment: 81y Female with pmhx of HTN, ESRD on HD, anemia of chronic disease who presents s/p [facility] fall c/o severe left hip pain and inability to ambulate.    Plan of Care:     #L femoral neck fracture  - S/p mechanical fall  - S/P L hip hemiarthroplasty.   - Tolerated procedure well.   - No evidence of post-op coronary ischemia  - EKG shows sinus with 1st degree block. No acute ischemic changes  - Management as per Ortho.     #HTN  -BP acceptable.  - Resume BB, Hydralazine and Norvasc    #ESRD  - HD as per nephro            Over 25 minutes spent on total encounter; more than 50% of the visit was spent counseling and/or coordinating care by the attending physician.      Adrian Denise D.O.   Cardiovascular Disease  [phone]
[facility] [place]   MD MODESTO HENSON MD RUORU WONG, PA    TEL:  OFFICE: [phone]  DR OSEGUERA CELL: [phone]  MARCELA ROWLAND CELL: [phone]  DR. MORENO CELL: [phone]      FROM 5 PM - 7 AM PLEASE CALL ANSWERING SERVICE: [phone]    RENAL FOLLOW UP NOTE--Date of Service 02-14-22 @ 09:46  --------------------------------------------------------------------------------  HPI:      Pt seen and examined at bedside.   Denies SOB, chest pain     PAST HISTORY  --------------------------------------------------------------------------------  No significant changes to PMH, PSH, FHx, SHx, unless otherwise noted    ALLERGIES & MEDICATIONS  --------------------------------------------------------------------------------  Allergies    alcohol swabs (Unknown)  IV contrast (Urticaria)  No Known Drug Allergies    Intolerances    adhesives (Rash)  alcohol wipes (Rash)    Standing Inpatient Medications  acetaminophen     Tablet .. 650 milliGRAM(s) Oral every 6 hours  amLODIPine   Tablet 10 milliGRAM(s) Oral daily  dexlansoprazole DR 60 milliGRAM(s) Oral daily  enoxaparin Injectable 30 milliGRAM(s) SubCutaneous daily  labetalol 100 milliGRAM(s) Oral two times a day  losartan 100 milliGRAM(s) Oral daily  polyethylene glycol 3350 17 Gram(s) Oral at bedtime  senna 2 Tablet(s) Oral at bedtime    PRN Inpatient Medications  benzocaine 15 mG/menthol 3.6 mG Lozenge 1 Lozenge Oral every 6 hours PRN  bisacodyl Suppository 10 milliGRAM(s) Rectal once PRN  magnesium hydroxide Suspension 30 milliLiter(s) Oral daily PRN  melatonin 3 milliGRAM(s) Oral at bedtime PRN  ondansetron Injectable 4 milliGRAM(s) IV Push every 6 hours PRN  oxyCODONE    IR 2.5 milliGRAM(s) Oral every 4 hours PRN  oxyCODONE    IR 5 milliGRAM(s) Oral every 4 hours PRN      REVIEW OF SYSTEMS  --------------------------------------------------------------------------------  General: no fever  CVS: no chest pain  MSK: no edema     VITALS/PHYSICAL EXAM  --------------------------------------------------------------------------------  T(C): 36.8 (02-14-22 @ 09:00), Max: 36.8 (02-13-22 @ 20:44)  HR: 79 (02-14-22 @ 09:00) (69 - 79)  BP: 173/74 (02-14-22 @ 09:00) (144/54 - 173/74)  RR: 17 (02-14-22 @ 09:00) (16 - 18)  SpO2: 96% (02-14-22 @ 09:00) (94% - 98%)  Wt(kg): --        02-13-22 @ 07:01  -  02-14-22 @ 07:00  --------------------------------------------------------  IN: 780 mL / OUT: 0 mL / NET: 780 mL      Physical Exam:  	Gen: NAD  	HEENT: MMBRIAN  	Pulm: CTA B/L  	CV: S1S2  	Abd: Soft, +BS  	Ext: No LE edema B/L                      Neuro: Awake   	Skin: Warm and Dry   	Vascular access: avf          JORDAN no  bonita  LABS/STUDIES  --------------------------------------------------------------------------------              12.8   5.29  >-----------<  103      [02-13-22 @ 07:38]              41.2     135  |  93  |  49  ----------------------------<  114      [02-13-22 @ 07:38]  5.6   |  22  |  5.89        Ca     9.3     [02-13-22 @ 07:38]            Creatinine Trend:  SCr 5.89 [02-13 @ 07:38]  SCr 4.78 [02-12 @ 12:48]  SCr 4.53 [02-12 @ 04:50]  SCr 6.66 [02-11 @ 12:47]  SCr 6.26 [02-11 @ 11:42]            
[facility]   MD MODESTO HENSON PA MIJUNG SHIN NP     TEL:  OFFICE: [phone]  DR OSEGUERA CELL: [phone]  DR. MORENO CELL: [phone]  CHARU ROWLAND CELL: [phone]  NP Makenna Encarnacion CELL: [phone]    From 5pm-7am Answering Service [phone]    -- RENAL FOLLOW UP NOTE ---Date of Service 02-12-22 @ 16:47    Patient is a 81y old  Female who presents with a chief complaint of Left Femoral Neck fracture.         Patient seen and examined at bedside. No chest pain/sob    VITALS:  T(F): 97.6 (02-12-22 @ 15:00), Max: 98.8 (02-12-22 @ 04:20)  HR: 61 (02-12-22 @ 15:00)  BP: 146/56 (02-12-22 @ 15:00)  RR: 16 (02-12-22 @ 15:00)  SpO2: 93% (02-12-22 @ 15:00)  Wt(kg): --    02-11 @ 07:01  -  02-12 @ 07:00  --------------------------------------------------------  IN: 0 mL / OUT: 1700 mL / NET: -1700 mL    02-12 @ 07:01  -  02-12 @ 16:47  --------------------------------------------------------  IN: 120 mL / OUT: 0 mL / NET: 120 mL      Height (cm): 160 (02-12 @ 09:29)  Weight (kg): 46.3 (02-12 @ 09:29)  BMI (kg/m2): 18.1 (02-12 @ 09:29)  BSA (m2): 1.45 (02-12 @ 09:29)    PHYSICAL EXAM:  Constitutional: Pt in bed.   Neck: No JVD  Respiratory: CTAB, no wheezes, rales or rhonchi  Cardiovascular: S1, S2, RRR  Gastrointestinal: BS+, soft, NT/ND  Extremities:  a left femoral neck fracture, no b/l LE edema  Vascular access: F    [facility] Medications:   MEDICATIONS  (STANDING):  acetaminophen     Tablet .. 650 milliGRAM(s) Oral every 6 hours  amLODIPine   Tablet 10 milliGRAM(s) Oral daily  ceFAZolin   IVPB 2000 milliGRAM(s) IV Intermittent every 8 hours  hydrALAZINE 25 milliGRAM(s) Oral four times a day  labetalol 100 milliGRAM(s) Oral two times a day  lactated ringers Bolus 500 milliLiter(s) IV Bolus once  lactated ringers Bolus 500 milliLiter(s) IV Bolus once  lactated ringers Bolus 500 milliLiter(s) IV Bolus once  losartan 100 milliGRAM(s) Oral daily  pantoprazole    Tablet 40 milliGRAM(s) Oral before breakfast  polyethylene glycol 3350 17 Gram(s) Oral at bedtime  senna 2 Tablet(s) Oral at bedtime  sucralfate 1 Gram(s) Oral four times a day      LABS:  02-12    138  |  99  |  29<H>  ----------------------------<  83  4.7   |  23  |  4.78<H>    Ca    8.7      12 Feb 2022 12:48    TPro  6.5  /  Alb  4.3  /  TBili  0.7  /  DBili      /  AST  15  /  ALT  10  /  AlkPhos  169<H>  02-12    Creatinine Trend: 4.78 <--, 4.53 <--, 6.66 <--, 6.26 <--    Albumin, Serum: 4.3 g/dL (02-12 @ 04:50)                              12.7   5.01  )-----------( 90       ( 12 Feb 2022 12:48 )             42.0     Urine Studies:            RADIOLOGY & ADDITIONAL STUDIES:  
Post op Day [ 10]    Patient resting without complaints.  No chest pain, SOB, N/V.    T(C): 36.6 (02-22-22 @ 04:57), Max: 36.7 (02-21-22 @ 16:10)  HR: 64 (02-22-22 @ 04:57) (64 - 78)  BP: 137/56 (02-22-22 @ 04:57) (108/49 - 145/58)  RR: 18 (02-22-22 @ 04:57) (18 - 18)  SpO2: 93% (02-22-22 @ 04:57) (93% - 97%)  Wt(kg): --    Exam:  Alert and Oriented, No Acute Distress  L  hip aquacel mild serosang drainage, otherwise c/d/i with soft/compressible compartments  L Thigh Excoriation site improving, less erythema and of smaller diameter, re-dressed with petroleum gauze and telfa/tegi  Calves Soft, Non-tender bilaterally  abd pillow in place  +PF/DF/EHL/FHL  SILT  +DP/PT on doppler, foot warm, well-perfused     02-21    138  |  96  |  71<H>  ----------------------------<  109<H>  4.9   |  21<L>  |  7.45<H>    Ca    9.0      21 Feb 2022 13:59        
 ORTHO  Patient is a 81y old  Female who presents with a chief complaint of Left Femoral Neck fracture (20 Feb 2022 14:41)    Pt. resting without complaint    VS-  T(C): 36.5 (02-21-22 @ 04:40), Max: 37.1 (02-20-22 @ 09:21)  HR: 82 (02-21-22 @ 04:40) (71 - 88)  BP: 164/61 (02-21-22 @ 04:40) (128/52 - 164/61)  RR: 18 (02-21-22 @ 04:40) (18 - 18)  SpO2: 96% (02-21-22 @ 04:40) (95% - 97%)  Wt(kg): --    M.S. Alert  Extremity- Left LE dressing C/D/I  Neuro-              Motor- (+) Ankle- DF/PF              Sensation- grossly intact to light touch              Calves- soft, nontender- PAS           02-20    136  |  97  |  53<H>  ----------------------------<  131<H>  4.8   |  21<L>  |  6.24<H>    Ca    9.2      20 Feb 2022 10:18  Phos  5.0     02-19           
[facility] [place] [facility]   MD MODESTO HENSON MD RUORU WONG, PA    TEL:  FROM 9 AM to 5 PM ---OFFICE: [phone]    FROM 5 PM - 9 AM PLEASE CALL ANSWERING SERVICE: [phone]    RENAL FOLLOW UP NOTE--Date of Service 02-22-22 @ 09:08  --------------------------------------------------------------------------------  HPI:      Pt seen and examined at bedside.   Attilaies SOB, chest pain     PAST HISTORY  --------------------------------------------------------------------------------  No significant changes to PMH, PSH, FHx, SHx, unless otherwise noted    ALLERGIES & MEDICATIONS  --------------------------------------------------------------------------------  Allergies    alcohol swabs (Unknown)  IV contrast (Urticaria)  No Known Drug Allergies    Intolerances    adhesives (Rash)  alcohol wipes (Rash)    Standing Inpatient Medications  amLODIPine   Tablet 10 milliGRAM(s) Oral daily  apixaban 2.5 milliGRAM(s) Oral two times a day  BACItracin   Ointment 1 Application(s) Topical three times a day  dexlansoprazole DR 60 milliGRAM(s) Oral daily  doxercalciferol Injectable 4 MICROGram(s) IV Push <User Schedule>  hydrALAZINE 25 milliGRAM(s) Oral three times a day  labetalol 100 milliGRAM(s) Oral two times a day  losartan 100 milliGRAM(s) Oral daily  Nephro-terri 1 Tablet(s) Oral daily  nystatin    Suspension 376822 Unit(s) Swish and Swallow two times a day  polyethylene glycol 3350 17 Gram(s) Oral at bedtime  senna 2 Tablet(s) Oral at bedtime    PRN Inpatient Medications  acetaminophen     Tablet .. 975 milliGRAM(s) Oral every 8 hours PRN  benzocaine 15 mG/menthol 3.6 mG Lozenge 1 Lozenge Oral every 6 hours PRN  magnesium hydroxide Suspension 30 milliLiter(s) Oral daily PRN  melatonin 3 milliGRAM(s) Oral at bedtime PRN  ondansetron Injectable 4 milliGRAM(s) IV Push every 6 hours PRN      REVIEW OF SYSTEMS  --------------------------------------------------------------------------------  General: no fever  CVS: no chest pain  MSK: no edema     VITALS/PHYSICAL EXAM  --------------------------------------------------------------------------------  T(C): 36.6 (02-22-22 @ 04:57), Max: 36.7 (02-21-22 @ 16:10)  HR: 64 (02-22-22 @ 04:57) (64 - 78)  BP: 137/56 (02-22-22 @ 04:57) (108/49 - 145/58)  RR: 18 (02-22-22 @ 04:57) (18 - 18)  SpO2: 93% (02-22-22 @ 04:57) (93% - 97%)  Wt(kg): --        02-21-22 @ 07:01  -  02-22-22 @ 07:00  --------------------------------------------------------  IN: 700 mL / OUT: 1940 mL / NET: -1240 mL      Physical Exam:  	Gen: NAD  	HEENT: MMM  	Pulm: CTA B/L  	CV: S1S2  	Abd: Soft, +BS  	Ext: No LE edema B/L                      Neuro: Awake   	Skin: Warm and Dry   	Vascular access:avf            no bonita  LABS/STUDIES  --------------------------------------------------------------------------------    138  |  96  |  71  ----------------------------<  109      [02-21-22 @ 13:59]  4.9   |  21  |  7.45        Ca     9.0     [02-21-22 @ 13:59]            Creatinine Trend:  SCr 7.45 [02-21 @ 13:59]  SCr 6.24 [02-20 @ 10:18]  SCr 4.79 [02-19 @ 11:08]  SCr 6.48 [02-18 @ 09:58]  SCr 4.76 [02-17 @ 10:09]        PTH -- (Ca 8.6)      [02-16-22 @ 15:58]   948    HBsAb Reactive      [02-16-22 @ 14:52]  HBsAg Nonreact      [02-16-22 @ 14:52]    
Cardiovascular Disease Progress Note    Overnight events: No acute events overnight.  Ms. Spear denies chest pain or SOB.   Otherwise review of systems negative    Objective Findings:  T(C): 36.9 (22 @ 10:23), Max: 37.1 (22 @ 00:38)  HR: 78 (22 @ 10:23) (76 - 90)  BP: 156/68 (22 @ 10:23) (150/54 - 180/63)  RR: 18 (22 @ 10:23) (18 - 18)  SpO2: 95% (22 @ 10:23) (93% - 98%)  Wt(kg): --  Daily     Daily Weight in k (2022 12:28)      Physical Exam:  Gen: NAD; Patient resting comfortably  HEENT: EOMI, Normocephalic/ atraumatic  CV: RRR, normal S1 + S2, no m/r/g  Lungs:  Normal respiratory effort; clear to auscultation bilaterally  Abd: soft, non-tender; bowel sounds present  Ext: No edema; warm and well perfused    Telemetry: N/a    Laboratory Data:                        11.0   3.16  )-----------( 168      ( 2022 09:58 )             35.4     02-18    135  |  95<L>  |  58<H>  ----------------------------<  121<H>  4.6   |  23  |  6.48<H>    Ca    9.1      2022 09:58  Phos  5.4     02-18                Inpatient Medications:  MEDICATIONS  (STANDING):  amLODIPine   Tablet 10 milliGRAM(s) Oral daily  apixaban 2.5 milliGRAM(s) Oral two times a day  BACItracin   Ointment 1 Application(s) Topical three times a day  dexlansoprazole DR 60 milliGRAM(s) Oral daily  doxercalciferol Injectable 4 MICROGram(s) IV Push <User Schedule>  hydrALAZINE 25 milliGRAM(s) Oral three times a day  labetalol 100 milliGRAM(s) Oral two times a day  losartan 100 milliGRAM(s) Oral daily  Nephro-terri 1 Tablet(s) Oral daily  nystatin    Suspension 669020 Unit(s) Swish and Swallow two times a day  polyethylene glycol 3350 17 Gram(s) Oral at bedtime  senna 2 Tablet(s) Oral at bedtime      Assessment: 81y Female with pmhx of HTN, ESRD on HD, anemia of chronic disease who presents s/p [facility] fall c/o severe left hip pain and inability to ambulate.    Plan of Care:     #L femoral neck fracture  - S/p mechanical fall  - S/P L hip hemiarthroplasty.   - Tolerated procedure well.   - No evidence of post-op coronary ischemia  - EKG shows sinus with 1st degree block. No acute ischemic changes  - TTE shows preserved LVEF with calcification of the aortic valve and no AS or AI, dilated left atrium and a pericardial effusion with no signs of tamponade.  - DVT ppx with low dose eliquis.   - Management as per Ortho.     #HTN  - BP acceptable for age  - Continue BB, Hydralazine and Norvasc      #ESRD  - HD as per nephro    #ACP (advance care planning)-  Advanced care planning was addressed. Pt is optimized from a cardiac standpoint. Awaiting discharge to [facility] at the moment.  Risks, benefits and alternatives of medical treatment and procedures were discussed in detail and all questions were answered. 30 minutes spent addressing advance care plans.          Over 25 minutes spent on total encounter; more than 50% of the visit was spent counseling and/or coordinating care by the attending physician.      Adrian Denise D.O.   Cardiovascular Disease  [phone]
Cardiovascular Disease Progress Note    Overnight events: No acute events overnight.  Ms. Spear is resting in bed comfortably. Denies chest pain or SOB.   Otherwise review of systems negative    Objective Findings:  T(C): 36.8 (22 @ 09:00), Max: 36.8 (22 @ 20:44)  HR: 79 (22 @ 09:00) (69 - 79)  BP: 173/74 (22 @ 09:00) (144/54 - 173/74)  RR: 17 (22 @ 09:00) (16 - 18)  SpO2: 96% (22 @ 09:00) (94% - 98%)  Wt(kg): --  Daily     Daily Weight in k.9 (2022 09:00)      Physical Exam:  Gen: NAD; Patient resting comfortably  HEENT: EOMI, Normocephalic/ atraumatic  CV: RRR, normal S1 + S2, no m/r/g  Lungs:  Normal respiratory effort; clear to auscultation bilaterally  Abd: soft, non-tender; bowel sounds present  Ext: No edema; warm and well perfused    Telemetry: N/a    Laboratory Data:                        11.9   6.43  )-----------( 107      ( 2022 10:58 )             37.9     02-13    135  |  93<L>  |  49<H>  ----------------------------<  114<H>  5.6<H>   |  22  |  5.89<H>    Ca    9.3      2022 07:38                Inpatient Medications:  MEDICATIONS  (STANDING):  acetaminophen     Tablet .. 650 milliGRAM(s) Oral every 6 hours  amLODIPine   Tablet 10 milliGRAM(s) Oral daily  dexlansoprazole DR 60 milliGRAM(s) Oral daily  enoxaparin Injectable 30 milliGRAM(s) SubCutaneous daily  labetalol 100 milliGRAM(s) Oral two times a day  losartan 100 milliGRAM(s) Oral daily  polyethylene glycol 3350 17 Gram(s) Oral at bedtime  senna 2 Tablet(s) Oral at bedtime      Assessment: 81y Female with pmhx of HTN, ESRD on HD, anemia of chronic disease who presents s/p [facility] fall c/o severe left hip pain and inability to ambulate.    Plan of Care:     #L femoral neck fracture  - S/p mechanical fall  - S/P L hip hemiarthroplasty.   - Tolerated procedure well.   - No evidence of post-op coronary ischemia  - EKG shows sinus with 1st degree block. No acute ischemic changes  - Management as per Ortho.     #HTN  -BP acceptable for age  - Resume BB, Hydralazine and Norvasc    #ESRD  - HD as per nephro      #ACP (advance care planning)-  Advanced care planning was addressed. Ms. Spear is optmized from a cardiac standpoint to be discharged to [facility]. Risks, benefits and alternatives of medical treatment and procedures were discussed in detail and all questions were answered. 30 minutes spent addressing advance care plans.          Over 25 minutes spent on total encounter; more than 50% of the visit was spent counseling and/or coordinating care by the attending physician.      Adrian Denise D.O.   Cardiovascular Disease  [phone]
Cardiovascular Disease Progress Note    Overnight events: No acute events overnight.  Ms. Spear is resting in bed. Pain is better controlled today.   Otherwise review of systems negative    Objective Findings:  T(C): 36.4 (02-12-22 @ 10:04), Max: 37.1 (02-12-22 @ 04:20)  HR: 80 (02-12-22 @ 10:04) (63 - 80)  BP: 170/80 (02-12-22 @ 10:04) (132/60 - 188/63)  RR: 16 (02-12-22 @ 10:04) (16 - 20)  SpO2: 98% (02-12-22 @ 10:04) (92% - 98%)  Wt(kg): --  Daily Height in cm: 160.02 (12 Feb 2022 09:29)    Daily       Physical Exam:  Gen: NAD; Patient resting comfortably  HEENT: EOMI, Normocephalic/ atraumatic  CV: RRR, normal S1 + S2, no m/r/g  Lungs:  Normal respiratory effort; clear to auscultation bilaterally  Abd: soft, non-tender; bowel sounds present  Ext: No edema; warm and well perfused. Tenderness to palpation over L hip.     Telemetry: n/a    Laboratory Data:                        13.9   4.75  )-----------( x        ( 12 Feb 2022 10:00 )             45.0     02-12    137  |  95<L>  |  26<H>  ----------------------------<  94  4.4   |  26  |  4.53<H>    Ca    9.8      12 Feb 2022 04:50    TPro  6.5  /  Alb  4.3  /  TBili  0.7  /  DBili  x   /  AST  15  /  ALT  10  /  AlkPhos  169<H>  02-12    PT/INR - ( 12 Feb 2022 04:50 )   PT: 11.0 sec;   INR: 0.91 ratio         PTT - ( 12 Feb 2022 04:50 )  PTT:36.2 sec          Inpatient Medications:  MEDICATIONS  (STANDING):  acetaminophen     Tablet .. 975 milliGRAM(s) Oral every 8 hours  amLODIPine   Tablet 10 milliGRAM(s) Oral daily  hydrALAZINE 25 milliGRAM(s) Oral four times a day  labetalol 100 milliGRAM(s) Oral daily  sodium chloride 0.9%. 1000 milliLiter(s) (50 mL/Hr) IV Continuous <Continuous>      Assessment: 81y Female with pmhx of HTN, ESRD on HD, anemia of chronic disease who [facility]/o severe left hip pain and inability to ambulate.    Plan of Care:     #L femoral neck fracture  - S/p mechanical fall  - EKG shows sinus with 1st degree block. No acute ischemic changes  - Surgery planned for today with ortho.   - Pt shows no evidence of pre-operative coronary ischemia  -  RCRI score 1 indicating low risk. Pt may proceed from cardiac standpoint.       #HTN  - Elevated 2/2 pain  - Resume BB, Hydralazine and Norvasc    #ESRD  - HD as per nephro              Over 25 minutes spent on total encounter; more than 50% of the visit was spent counseling and/or coordinating care by the attending physician.      Adrian Denise D.O.   Cardiovascular Disease  [phone]
Cardiovascular Disease Progress Note    Overnight events: No acute events overnight. Ms. Spear denies chest pain or SOB.   Otherwise review of systems negative    Objective Findings:  T(C): 36 (22 @ 08:45), Max: 37 (22 @ 12:38)  HR: 77 (22 @ 08:45) (71 - 88)  BP: 138/63 (22 @ 08:45) (128/52 - 164/61)  RR: 18 (22 @ 08:45) (18 - 18)  SpO2: 96% (22 @ 08:45) (95% - 97%)  Wt(kg): --  Daily     Daily Weight in k (2022 08:45)      Physical Exam:  Gen: NAD; Patient resting comfortably  HEENT: EOMI, Normocephalic/ atraumatic  CV: RRR, normal S1 + S2, no m/r/g  Lungs:  Normal respiratory effort; clear to auscultation bilaterally  Abd: soft, non-tender; bowel sounds present  Ext: No edema; warm and well perfused    Telemetry:    Laboratory Data:        136  |  97  |  53<H>  ----------------------------<  131<H>  4.8   |  21<L>  |  6.24<H>    Ca    9.2      2022 10:18  Phos  5.0                     Inpatient Medications:  MEDICATIONS  (STANDING):  amLODIPine   Tablet 10 milliGRAM(s) Oral daily  apixaban 2.5 milliGRAM(s) Oral two times a day  BACItracin   Ointment 1 Application(s) Topical three times a day  dexlansoprazole DR 60 milliGRAM(s) Oral daily  doxercalciferol Injectable 4 MICROGram(s) IV Push <User Schedule>  hydrALAZINE 25 milliGRAM(s) Oral three times a day  labetalol 100 milliGRAM(s) Oral two times a day  losartan 100 milliGRAM(s) Oral daily  Nephro-terri 1 Tablet(s) Oral daily  nystatin    Suspension [id number] Unit(s) Swish and Swallow two times a day  polyethylene glycol 3350 17 Gram(s) Oral at bedtime  senna 2 Tablet(s) Oral at bedtime      Assessment: 81y Female with pmhx of HTN, ESRD on HD, anemia of chronic disease who presents s/p [facility] fall c/o severe left hip pain and inability to ambulate.    Plan of Care:     #L femoral neck fracture  - S/p mechanical fall  - S/P L hip hemiarthroplasty.   - Tolerated procedure well.   - No evidence of post-op coronary ischemia  - EKG shows sinus with 1st degree block. No acute ischemic changes  - TTE shows preserved LVEF with calcification of the aortic valve and no AS or AI, dilated left atrium and a pericardial effusion with no signs of tamponade.  - No further cardiac work up warranted at this time.   - DVT ppx with low dose eliquis.   - Management as per Ortho.     #HTN  - BP acceptable for age  - Continue BB, Hydralazine and Norvasc      #ESRD  - HD as per nephro          Over 25 minutes spent on total encounter; more than 50% of the visit was spent counseling and/or coordinating care by the attending physician.      Adrian Denise D.O.   Cardiovascular Disease  [phone]
DATE OF SERVICE: 02-16-22 @ 13:16    Patient is a 81y old  Female who presents with a chief complaint of Left Femoral Neck fracture (16 Feb 2022 11:23)      SUBJECTIVE / OVERNIGHT EVENTS:  No chest pain. No shortness of breath. No complaints. No events overnight. c/o sore throat, no evidence of thrush    MEDICATIONS  (STANDING):  amLODIPine   Tablet 10 milliGRAM(s) Oral daily  apixaban 2.5 milliGRAM(s) Oral two times a day  BACItracin   Ointment 1 Application(s) Topical three times a day  dexlansoprazole DR 60 milliGRAM(s) Oral daily  labetalol 100 milliGRAM(s) Oral two times a day  losartan 100 milliGRAM(s) Oral daily  Nephro-terri 1 Tablet(s) Oral daily  nystatin    Suspension 443070 Unit(s) Swish and Swallow two times a day  polyethylene glycol 3350 17 Gram(s) Oral at bedtime  senna 2 Tablet(s) Oral at bedtime    MEDICATIONS  (PRN):  acetaminophen     Tablet .. 975 milliGRAM(s) Oral every 8 hours PRN Temp greater or equal to 38C (100.4F), Mild Pain (1 - 3)  benzocaine 15 mG/menthol 3.6 mG Lozenge 1 Lozenge Oral every 6 hours PRN Sore Throat  magnesium hydroxide Suspension 30 milliLiter(s) Oral daily PRN Constipation  melatonin 3 milliGRAM(s) Oral at bedtime PRN Insomnia  ondansetron Injectable 4 milliGRAM(s) IV Push every 6 hours PRN Nausea and/or Vomiting  oxyCODONE    IR 2.5 milliGRAM(s) Oral every 4 hours PRN Moderate Pain (4 - 6)  oxyCODONE    IR 5 milliGRAM(s) Oral every 4 hours PRN Severe Pain (7 - 10)      Vital Signs Last 24 Hrs  T(C): 36.4 (16 Feb 2022 09:32), Max: 36.8 (15 Feb 2022 20:35)  T(F): 97.5 (16 Feb 2022 09:32), Max: 98.2 (15 Feb 2022 20:35)  HR: 79 (16 Feb 2022 09:32) (66 - 81)  BP: 142/52 (16 Feb 2022 09:32) (135/70 - 158/57)  BP(mean): --  RR: 18 (16 Feb 2022 09:32) (18 - 18)  SpO2: 95% (16 Feb 2022 09:32) (95% - 97%)  CAPILLARY BLOOD GLUCOSE        I&O's Summary    15 Feb 2022 07:01  -  16 Feb 2022 07:00  --------------------------------------------------------  IN: 690 mL / OUT: 20 mL / NET: 670 mL    16 Feb 2022 07:01  -  16 Feb 2022 13:16  --------------------------------------------------------  IN: 240 mL / OUT: 0 mL / NET: 240 mL        PHYSICAL EXAM:  GENERAL: NAD, well-developed  HEAD:  Atraumatic, Normocephalic  EYES: EOMI, PERRLA, conjunctiva and sclera clear  NECK: Supple, No JVD  CHEST/LUNG: Clear to auscultation bilaterally; No wheeze  HEART: Regular rate and rhythm; No murmurs, rubs, or gallops  ABDOMEN: Soft, Nontender, Nondistended; Bowel sounds present  EXTREMITIES:  2+ Peripheral Pulses, No clubbing, cyanosis, or edema  PSYCH: AAOx3  NEUROLOGY: non-focal  SKIN: No rashes or lesions    LABS:                        11.2   3.60  )-----------( 117      ( 16 Feb 2022 10:14 )             35.4     02-16    134<L>  |  94<L>  |  67<H>  ----------------------------<  107<H>  4.2   |  22  |  6.78<H>    Ca    8.7      16 Feb 2022 10:18                RADIOLOGY & ADDITIONAL TESTS:    Imaging Personally Reviewed:    Consultant(s) Notes Reviewed:      Care Discussed with Consultants/Other Providers:  
DATE OF SERVICE: 02-18-22 @ 15:05    Patient is a 81y old  Female who presents with a chief complaint of Left Femoral Neck fracture (18 Feb 2022 11:27)      SUBJECTIVE / OVERNIGHT EVENTS:  seen at HD ,  No chest pain. No shortness of breath. No complaints. No events overnight. spoke with daughter on the phone    MEDICATIONS  (STANDING):  amLODIPine   Tablet 10 milliGRAM(s) Oral daily  apixaban 2.5 milliGRAM(s) Oral two times a day  BACItracin   Ointment 1 Application(s) Topical three times a day  dexlansoprazole DR 60 milliGRAM(s) Oral daily  doxercalciferol Injectable 4 MICROGram(s) IV Push <User Schedule>  hydrALAZINE 25 milliGRAM(s) Oral three times a day  labetalol 100 milliGRAM(s) Oral two times a day  losartan 100 milliGRAM(s) Oral daily  Nephro-terri 1 Tablet(s) Oral daily  nystatin    Suspension 600730 Unit(s) Swish and Swallow two times a day  polyethylene glycol 3350 17 Gram(s) Oral at bedtime  senna 2 Tablet(s) Oral at bedtime    MEDICATIONS  (PRN):  acetaminophen     Tablet .. 975 milliGRAM(s) Oral every 8 hours PRN Temp greater or equal to 38C (100.4F), Mild Pain (1 - 3)  benzocaine 15 mG/menthol 3.6 mG Lozenge 1 Lozenge Oral every 6 hours PRN Sore Throat  magnesium hydroxide Suspension 30 milliLiter(s) Oral daily PRN Constipation  melatonin 3 milliGRAM(s) Oral at bedtime PRN Insomnia  ondansetron Injectable 4 milliGRAM(s) IV Push every 6 hours PRN Nausea and/or Vomiting  oxyCODONE    IR 2.5 milliGRAM(s) Oral every 4 hours PRN Moderate Pain (4 - 6)  oxyCODONE    IR 5 milliGRAM(s) Oral every 4 hours PRN Severe Pain (7 - 10)      Vital Signs Last 24 Hrs  T(C): 36.8 (18 Feb 2022 13:17), Max: 37.2 (17 Feb 2022 21:06)  T(F): 98.3 (18 Feb 2022 13:17), Max: 98.9 (17 Feb 2022 21:06)  HR: 83 (18 Feb 2022 15:00) (77 - 88)  BP: 165/57 (18 Feb 2022 15:00) (153/53 - 180/63)  BP(mean): --  RR: 18 (18 Feb 2022 13:17) (16 - 18)  SpO2: 95% (18 Feb 2022 13:17) (93% - 97%)  CAPILLARY BLOOD GLUCOSE        I&O's Summary    17 Feb 2022 07:01  -  18 Feb 2022 07:00  --------------------------------------------------------  IN: 1060 mL / OUT: 150 mL / NET: 910 mL    18 Feb 2022 07:01  -  18 Feb 2022 15:05  --------------------------------------------------------  IN: 480 mL / OUT: 1500 mL / NET: -1020 mL        PHYSICAL EXAM:  GENERAL: NAD, well-developed  HEAD:  Atraumatic, Normocephalic  EYES: EOMI, PERRLA, conjunctiva and sclera clear  NECK: Supple, No JVD  CHEST/LUNG: Clear to auscultation bilaterally; No wheeze  HEART: Regular rate and rhythm; No murmurs, rubs, or gallops  ABDOMEN: Soft, Nontender, Nondistended; Bowel sounds present  EXTREMITIES:  2+ Peripheral Pulses, No clubbing, cyanosis, or edema  PSYCH: AAOx3  NEUROLOGY: non-focal  SKIN: No rashes or lesions    LABS:                        11.0   3.16  )-----------( 168      ( 18 Feb 2022 09:58 )             35.4     02-18    135  |  95<L>  |  58<H>  ----------------------------<  121<H>  4.6   |  23  |  6.48<H>    Ca    9.1      18 Feb 2022 09:58  Phos  5.4     02-18                RADIOLOGY & ADDITIONAL TESTS:    Imaging Personally Reviewed:    Consultant(s) Notes Reviewed:      Care Discussed with Consultants/Other Providers:  
DATE OF SERVICE: 02-20-22 @ 13:55    Patient is a 81y old  Female who presents with a chief complaint of Left Femoral Neck fracture (20 Feb 2022 11:27)      SUBJECTIVE / OVERNIGHT EVENTS:  No chest pain. No shortness of breath. No complaints. No events overnight.     MEDICATIONS  (STANDING):  amLODIPine   Tablet 10 milliGRAM(s) Oral daily  apixaban 2.5 milliGRAM(s) Oral two times a day  BACItracin   Ointment 1 Application(s) Topical three times a day  dexlansoprazole DR 60 milliGRAM(s) Oral daily  doxercalciferol Injectable 4 MICROGram(s) IV Push <User Schedule>  hydrALAZINE 25 milliGRAM(s) Oral three times a day  labetalol 100 milliGRAM(s) Oral two times a day  losartan 100 milliGRAM(s) Oral daily  Nephro-terri 1 Tablet(s) Oral daily  nystatin    Suspension 628478 Unit(s) Swish and Swallow two times a day  polyethylene glycol 3350 17 Gram(s) Oral at bedtime  senna 2 Tablet(s) Oral at bedtime    MEDICATIONS  (PRN):  acetaminophen     Tablet .. 975 milliGRAM(s) Oral every 8 hours PRN Temp greater or equal to 38C (100.4F), Mild Pain (1 - 3)  benzocaine 15 mG/menthol 3.6 mG Lozenge 1 Lozenge Oral every 6 hours PRN Sore Throat  magnesium hydroxide Suspension 30 milliLiter(s) Oral daily PRN Constipation  melatonin 3 milliGRAM(s) Oral at bedtime PRN Insomnia  ondansetron Injectable 4 milliGRAM(s) IV Push every 6 hours PRN Nausea and/or Vomiting      Vital Signs Last 24 Hrs  T(C): 37.1 (20 Feb 2022 09:21), Max: 37.2 (19 Feb 2022 20:40)  T(F): 98.8 (20 Feb 2022 09:21), Max: 99 (19 Feb 2022 20:40)  HR: 77 (20 Feb 2022 09:21) (77 - 87)  BP: 144/74 (20 Feb 2022 09:21) (136/58 - 159/65)  BP(mean): --  RR: 18 (20 Feb 2022 09:21) (18 - 18)  SpO2: 95% (20 Feb 2022 09:21) (93% - 95%)  CAPILLARY BLOOD GLUCOSE        I&O's Summary    19 Feb 2022 07:01  -  20 Feb 2022 07:00  --------------------------------------------------------  IN: 1440 mL / OUT: 1 mL / NET: 1439 mL    20 Feb 2022 07:01  -  20 Feb 2022 13:55  --------------------------------------------------------  IN: 240 mL / OUT: 0 mL / NET: 240 mL        PHYSICAL EXAM:  GENERAL: NAD, well-developed  HEAD:  Atraumatic, Normocephalic  EYES: EOMI, PERRLA, conjunctiva and sclera clear  NECK: Supple, No JVD  CHEST/LUNG: Clear to auscultation bilaterally; No wheeze  HEART: Regular rate and rhythm; No murmurs, rubs, or gallops  ABDOMEN: Soft, Nontender, Nondistended; Bowel sounds present  EXTREMITIES:  2+ Peripheral Pulses, No clubbing, cyanosis, or edema  PSYCH: AAOx3  NEUROLOGY: non-focal  SKIN: No rashes or lesions    LABS:    02-20    136  |  97  |  53<H>  ----------------------------<  131<H>  4.8   |  21<L>  |  6.24<H>    Ca    9.2      20 Feb 2022 10:18  Phos  5.0     02-19                RADIOLOGY & ADDITIONAL TESTS:    Imaging Personally Reviewed:    Consultant(s) Notes Reviewed:      Care Discussed with Consultants/Other Providers:  
[facility] NEPHROLOGY   Dr Deep Montgomery      OFFICE: [phone] ( 9 AM to 5PM )  Answering Service ( 5pm - 7 am) [phone]     Chief Complaint: ESRD/Ongoing hemodialysis requirement  date of service 02-16-22 @ 10:47    24 hour events/subjective:  PT seen and examined on dialysis , tolerating well. BP stable, Access working well      PAST HISTORY  --------------------------------------------------------------------------------  No significant changes to PMH, PSH, FHx, SHx, unless otherwise noted    ALLERGIES & MEDICATIONS  --------------------------------------------------------------------------------  Allergies    alcohol swabs (Unknown)  IV contrast (Urticaria)  No Known Drug Allergies    Intolerances    adhesives (Rash)  alcohol wipes (Rash)    Standing Inpatient Medications  amLODIPine   Tablet 10 milliGRAM(s) Oral daily  apixaban 2.5 milliGRAM(s) Oral two times a day  BACItracin   Ointment 1 Application(s) Topical three times a day  dexlansoprazole DR 60 milliGRAM(s) Oral daily  labetalol 100 milliGRAM(s) Oral two times a day  losartan 100 milliGRAM(s) Oral daily  Nephro-terri 1 Tablet(s) Oral daily  nystatin    Suspension [id number] Unit(s) Swish and Swallow two times a day  polyethylene glycol 3350 17 Gram(s) Oral at bedtime  senna 2 Tablet(s) Oral at bedtime    PRN Inpatient Medications  acetaminophen     Tablet .. 975 milliGRAM(s) Oral every 8 hours PRN  benzocaine 15 mG/menthol 3.6 mG Lozenge 1 Lozenge Oral every 6 hours PRN  magnesium hydroxide Suspension 30 milliLiter(s) Oral daily PRN  melatonin 3 milliGRAM(s) Oral at bedtime PRN  ondansetron Injectable 4 milliGRAM(s) IV Push every 6 hours PRN  oxyCODONE    IR 2.5 milliGRAM(s) Oral every 4 hours PRN  oxyCODONE    IR 5 milliGRAM(s) Oral every 4 hours PRN      REVIEW OF SYSTEMS  --------------------------------------------------------------------------------  As above.    VITALS/PHYSICAL EXAM  --------------------------------------------------------------------------------  T(C): 36.4 (02-16-22 @ 09:32), Max: 36.8 (02-15-22 @ 20:35)  HR: 79 (02-16-22 @ 09:32) (66 - 81)  BP: 142/52 (02-16-22 @ 09:32) (135/70 - 158/57)  RR: 18 (02-16-22 @ 09:32) (18 - 18)  SpO2: 95% (02-16-22 @ 09:32) (95% - 97%)  Wt(kg): --        02-15-22 @ 07:01  -  02-16-22 @ 07:00  --------------------------------------------------------  IN: 690 mL / OUT: 20 mL / NET: 670 mL      Physical Exam:  	Gen: NAD  	HEENT: PERRL  	Pulm: CTA B/L  	CV: S1S2  	Abd: Soft  	Ext: No LE edema B/L  	Neuro: Awake  	Skin: Warm and Dry   	Vascular access: avf            no  mesa  LABS/STUDIES  --------------------------------------------------------------------------------              11.2   3.60  >-----------<  117      [02-16-22 @ 10:14]              35.4     136  |  96  |  42  ----------------------------<  110      [02-15-22 @ 09:57]  3.9   |  23  |  5.33        Ca     8.6     [02-15-22 @ 09:57]                  
Ortho Progress Note    S: Patient seen and examined. No acute events overnight. Pain well controlled with current regimen. Denies lightheadedness/dizziness, CP/SOB. Tolerating diet. Patient complaining of dry mouth      O:  Physical Exam:  Gen: Laying in bed, NAD, alert and oriented.   Resp: Unlabored breathing  Ext: LLE- dressing c/d/i          EHL/FHL/TA/Sol intact          + SILT DP/SP/FUNEZ/Sa/T          +DP, extremity WWP    Vital Signs Last 24 Hrs  T(C): 36.4 (14 Feb 2022 04:57), Max: 36.9 (13 Feb 2022 09:32)  T(F): 97.5 (14 Feb 2022 04:57), Max: 98.5 (13 Feb 2022 09:32)  HR: 79 (14 Feb 2022 04:57) (67 - 79)  BP: 171/60 (14 Feb 2022 04:57) (133/49 - 171/60)  BP(mean): --  RR: 16 (14 Feb 2022 04:57) (16 - 18)  SpO2: 94% (14 Feb 2022 04:57) (94% - 98%)                          12.8   5.29  )-----------( 103      ( 13 Feb 2022 07:38 )             41.2                         12.7   5.01  )-----------( 90       ( 12 Feb 2022 12:48 )             42.0       02-13    135  |  93<L>  |  49<H>  ----------------------------<  114<H>  5.6<H>   |  22  |  5.89<H>                
Patient is a 81y old  Female who presents with a chief complaint of Left Femoral Neck fracture (13 Feb 2022 12:20)    Date of servie : 02-13-22 @ 12:23  INTERVAL HPI/OVERNIGHT EVENTS:  T(C): 36.9 (02-13-22 @ 09:32), Max: 36.9 (02-13-22 @ 09:32)  HR: 67 (02-13-22 @ 09:32) (58 - 69)  BP: 133/49 (02-13-22 @ 09:32) (106/53 - 158/65)  RR: 18 (02-13-22 @ 09:32) (14 - 18)  SpO2: 95% (02-13-22 @ 09:32) (93% - 100%)  Wt(kg): --  I&O's Summary    12 Feb 2022 07:01  -  13 Feb 2022 07:00  --------------------------------------------------------  IN: 360 mL / OUT: 0 mL / NET: 360 mL        LABS:                        12.8   5.29  )-----------( 103      ( 13 Feb 2022 07:38 )             41.2     02-13    135  |  93<L>  |  49<H>  ----------------------------<  114<H>  5.6<H>   |  22  |  5.89<H>    Ca    9.3      13 Feb 2022 07:38    TPro  6.5  /  Alb  4.3  /  TBili  0.7  /  DBili  x   /  AST  15  /  ALT  10  /  AlkPhos  169<H>  02-12    PT/INR - ( 12 Feb 2022 04:50 )   PT: 11.0 sec;   INR: 0.91 ratio         PTT - ( 12 Feb 2022 04:50 )  PTT:36.2 sec    CAPILLARY BLOOD GLUCOSE                MEDICATIONS  (STANDING):  acetaminophen     Tablet .. 650 milliGRAM(s) Oral every 6 hours  amLODIPine   Tablet 10 milliGRAM(s) Oral daily  enoxaparin Injectable 30 milliGRAM(s) SubCutaneous daily  hydrALAZINE 25 milliGRAM(s) Oral four times a day  labetalol 100 milliGRAM(s) Oral two times a day  lactated ringers Bolus 500 milliLiter(s) IV Bolus once  losartan 100 milliGRAM(s) Oral daily  pantoprazole    Tablet 40 milliGRAM(s) Oral before breakfast  polyethylene glycol 3350 17 Gram(s) Oral at bedtime  senna 2 Tablet(s) Oral at bedtime  sodium zirconium cyclosilicate 10 Gram(s) Oral two times a day  sucralfate 1 Gram(s) Oral four times a day    MEDICATIONS  (PRN):  magnesium hydroxide Suspension 30 milliLiter(s) Oral daily PRN Constipation  melatonin 3 milliGRAM(s) Oral at bedtime PRN Insomnia  ondansetron Injectable 4 milliGRAM(s) IV Push every 6 hours PRN Nausea and/or Vomiting  oxyCODONE    IR 2.5 milliGRAM(s) Oral every 4 hours PRN Moderate Pain (4 - 6)  oxyCODONE    IR 5 milliGRAM(s) Oral every 4 hours PRN Severe Pain (7 - 10)          PHYSICAL EXAM:  GENERAL: NAD, well-groomed, well-developed  HEAD:  Atraumatic, Normocephalic  CHEST/LUNG: Clear to percussion bilaterally; No rales, rhonchi, wheezing, or rubs  HEART: Regular rate and rhythm; No murmurs, rubs, or gallops  ABDOMEN: Soft, Nontender, Nondistended; Bowel sounds present  EXTREMITIES:  2+ Peripheral Pulses, No clubbing, cyanosis, or edema  LYMPH: No lymphadenopathy noted  SKIN: No rashes or lesions    Care Discussed with Consultants/Other Providers [ ] YES  [ ] NO
  Dr. Adame  Office [phone] (9 am to 5 pm)  Service: [phone] (5pm to 9am)  Mimi DING      RENAL PROGRESS NOTE: DATE OF SERVICE 02-21-22 @ 16:31    Patient is a 81y old  Female who presents with a chief complaint of Left Femoral Neck fracture (21 Feb 2022 12:12)      Patient seen and examined at bedside. No chest pain/sob    VITALS:  T(F): 97.5 (02-21-22 @ 12:24), Max: 98.4 (02-20-22 @ 20:24)  HR: 67 (02-21-22 @ 12:24)  BP: 145/58 (02-21-22 @ 12:24)  RR: 18 (02-21-22 @ 12:24)  SpO2: 95% (02-21-22 @ 12:24)  Wt(kg): --    02-20 @ 07:01  -  02-21 @ 07:00  --------------------------------------------------------  IN: 240 mL / OUT: 0 mL / NET: 240 mL    02-21 @ 07:01  -  02-21 @ 16:31  --------------------------------------------------------  IN: 220 mL / OUT: 1940 mL / NET: -1720 mL          PHYSICAL EXAM:  Constitutional: NAD  Neck: No JVD  Respiratory: CTAB, no wheezes, rales or rhonchi  Cardiovascular: S1, S2, RRR  Gastrointestinal: BS+, soft, NT/ND  Extremities: No peripheral edema    Hospital Medications:   MEDICATIONS  (STANDING):  amLODIPine   Tablet 10 milliGRAM(s) Oral daily  apixaban 2.5 milliGRAM(s) Oral two times a day  BACItracin   Ointment 1 Application(s) Topical three times a day  dexlansoprazole DR 60 milliGRAM(s) Oral daily  doxercalciferol Injectable 4 MICROGram(s) IV Push <User Schedule>  hydrALAZINE 25 milliGRAM(s) Oral three times a day  labetalol 100 milliGRAM(s) Oral two times a day  losartan 100 milliGRAM(s) Oral daily  Nephro-terri 1 Tablet(s) Oral daily  nystatin    Suspension 978080 Unit(s) Swish and Swallow two times a day  polyethylene glycol 3350 17 Gram(s) Oral at bedtime  senna 2 Tablet(s) Oral at bedtime      LABS:  02-21    138  |  96  |  71<H>  ----------------------------<  109<H>  4.9   |  21<L>  |  7.45<H>    Ca    9.0      21 Feb 2022 13:59      Creatinine Trend: 7.45 <--, 6.24 <--, 4.79 <--, 6.48 <--, 4.76 <--, 6.78 <--, 5.33 <--            Urine Studies:      PTH -- (Ca 8.6)      [02-16-22 @ 15:58]   948    HBsAb Reactive      [02-16-22 @ 14:52]  HBsAg Nonreact      [02-16-22 @ 14:52]      RADIOLOGY & ADDITIONAL STUDIES:  
DATE OF SERVICE: 02-17-22 @ 13:06    Patient is a 81y old  Female who presents with a chief complaint of Left Femoral Neck fracture (17 Feb 2022 11:36)      SUBJECTIVE / OVERNIGHT EVENTS:  No chest pain. No shortness of breath. No complaints. No events overnight.     MEDICATIONS  (STANDING):  amLODIPine   Tablet 10 milliGRAM(s) Oral daily  apixaban 2.5 milliGRAM(s) Oral two times a day  BACItracin   Ointment 1 Application(s) Topical three times a day  dexlansoprazole DR 60 milliGRAM(s) Oral daily  doxercalciferol Injectable 4 MICROGram(s) IV Push <User Schedule>  labetalol 100 milliGRAM(s) Oral two times a day  losartan 100 milliGRAM(s) Oral daily  Nephro-terri 1 Tablet(s) Oral daily  nystatin    Suspension 618945 Unit(s) Swish and Swallow two times a day  polyethylene glycol 3350 17 Gram(s) Oral at bedtime  senna 2 Tablet(s) Oral at bedtime    MEDICATIONS  (PRN):  acetaminophen     Tablet .. 975 milliGRAM(s) Oral every 8 hours PRN Temp greater or equal to 38C (100.4F), Mild Pain (1 - 3)  benzocaine 15 mG/menthol 3.6 mG Lozenge 1 Lozenge Oral every 6 hours PRN Sore Throat  magnesium hydroxide Suspension 30 milliLiter(s) Oral daily PRN Constipation  melatonin 3 milliGRAM(s) Oral at bedtime PRN Insomnia  ondansetron Injectable 4 milliGRAM(s) IV Push every 6 hours PRN Nausea and/or Vomiting  oxyCODONE    IR 2.5 milliGRAM(s) Oral every 4 hours PRN Moderate Pain (4 - 6)  oxyCODONE    IR 5 milliGRAM(s) Oral every 4 hours PRN Severe Pain (7 - 10)      Vital Signs Last 24 Hrs  T(C): 36.7 (17 Feb 2022 12:35), Max: 36.9 (16 Feb 2022 16:04)  T(F): 98 (17 Feb 2022 12:35), Max: 98.4 (16 Feb 2022 16:04)  HR: 72 (17 Feb 2022 12:35) (72 - 87)  BP: 148/57 (17 Feb 2022 08:25) (134/83 - 174/67)  BP(mean): --  RR: 17 (17 Feb 2022 12:35) (17 - 18)  SpO2: 97% (17 Feb 2022 12:35) (93% - 97%)  CAPILLARY BLOOD GLUCOSE        I&O's Summary    16 Feb 2022 07:01  -  17 Feb 2022 07:00  --------------------------------------------------------  IN: 780 mL / OUT: 1500 mL / NET: -720 mL    17 Feb 2022 07:01  -  17 Feb 2022 13:06  --------------------------------------------------------  IN: 480 mL / OUT: 0 mL / NET: 480 mL        PHYSICAL EXAM:  GENERAL: NAD, well-developed  HEAD:  Atraumatic, Normocephalic  EYES: EOMI, PERRLA, conjunctiva and sclera clear  NECK: Supple, No JVD  CHEST/LUNG: Clear to auscultation bilaterally; No wheeze  HEART: Regular rate and rhythm; No murmurs, rubs, or gallops  ABDOMEN: Soft, Nontender, Nondistended; Bowel sounds present  EXTREMITIES:  2+ Peripheral Pulses, No clubbing, cyanosis, or edema  PSYCH: AAOx3  NEUROLOGY: non-focal  SKIN: No rashes or lesions    LABS:                        12.0   3.41  )-----------( 150      ( 17 Feb 2022 10:09 )             37.8     02-17    135  |  95<L>  |  42<H>  ----------------------------<  118<H>  4.4   |  24  |  4.76<H>    Ca    9.2      17 Feb 2022 10:09                RADIOLOGY & ADDITIONAL TESTS:    Imaging Personally Reviewed:    Consultant(s) Notes Reviewed:      Care Discussed with Consultants/Other Providers:  
DATE OF SERVICE: 02-21-22 @ 12:12    Patient is a 81y old  Female who presents with a chief complaint of Left Femoral Neck fracture (21 Feb 2022 10:36)      SUBJECTIVE / OVERNIGHT EVENTS:  No chest pain. No shortness of breath. No complaints. No events overnight.     MEDICATIONS  (STANDING):  amLODIPine   Tablet 10 milliGRAM(s) Oral daily  apixaban 2.5 milliGRAM(s) Oral two times a day  BACItracin   Ointment 1 Application(s) Topical three times a day  dexlansoprazole DR 60 milliGRAM(s) Oral daily  doxercalciferol Injectable 4 MICROGram(s) IV Push <User Schedule>  hydrALAZINE 25 milliGRAM(s) Oral three times a day  labetalol 100 milliGRAM(s) Oral two times a day  losartan 100 milliGRAM(s) Oral daily  Nephro-terri 1 Tablet(s) Oral daily  nystatin    Suspension [id number] Unit(s) Swish and Swallow two times a day  polyethylene glycol 3350 17 Gram(s) Oral at bedtime  senna 2 Tablet(s) Oral at bedtime    MEDICATIONS  (PRN):  acetaminophen     Tablet .. 975 milliGRAM(s) Oral every 8 hours PRN Temp greater or equal to 38C (100.4F), Mild Pain (1 - 3)  benzocaine 15 mG/menthol 3.6 mG Lozenge 1 Lozenge Oral every 6 hours PRN Sore Throat  magnesium hydroxide Suspension 30 milliLiter(s) Oral daily PRN Constipation  melatonin 3 milliGRAM(s) Oral at bedtime PRN Insomnia  ondansetron Injectable 4 milliGRAM(s) IV Push every 6 hours PRN Nausea and/or Vomiting      Vital Signs Last 24 Hrs  T(C): 36 (21 Feb 2022 08:45), Max: 37 (20 Feb 2022 12:38)  T(F): 96.8 (21 Feb 2022 08:45), Max: 98.6 (20 Feb 2022 12:38)  HR: 77 (21 Feb 2022 08:45) (71 - 88)  BP: 138/63 (21 Feb 2022 08:45) (128/52 - 164/61)  BP(mean): --  RR: 18 (21 Feb 2022 08:45) (18 - 18)  SpO2: 96% (21 Feb 2022 08:45) (95% - 97%)  CAPILLARY BLOOD GLUCOSE        I&O's Summary    20 Feb 2022 07:01  -  21 Feb 2022 07:00  --------------------------------------------------------  IN: 240 mL / OUT: 0 mL / NET: 240 mL        PHYSICAL EXAM:  GENERAL: NAD, well-developed  HEAD:  Atraumatic, Normocephalic  EYES: EOMI, PERRLA, conjunctiva and sclera clear  NECK: Supple, No JVD  CHEST/LUNG: Clear to auscultation bilaterally; No wheeze  HEART: Regular rate and rhythm; No murmurs, rubs, or gallops  ABDOMEN: Soft, Nontender, Nondistended; Bowel sounds present  EXTREMITIES:  2+ Peripheral Pulses, No clubbing, cyanosis, or edema  PSYCH: AAOx3  NEUROLOGY: non-focal  SKIN: No rashes or lesions    LABS:    02-20    136  |  97  |  53<H>  ----------------------------<  131<H>  4.8   |  21<L>  |  6.24<H>    Ca    9.2      20 Feb 2022 10:18                RADIOLOGY & ADDITIONAL TESTS:    Imaging Personally Reviewed:    Consultant(s) Notes Reviewed:      Care Discussed with Consultants/Other Providers:  
[facility] [place] [facility]   MD MODESTO HENSON MD RUORU WONG, PA    TEL:  FROM 9 AM to 5 PM ---OFFICE: [phone]    FROM 5 PM - 9 AM PLEASE CALL ANSWERING SERVICE: [phone]    RENAL FOLLOW UP NOTE--Date of Service 02-18-22 @ 11:28  --------------------------------------------------------------------------------  HPI:      Pt seen and examined at bedside in dialysis tolerating well    PAST HISTORY  --------------------------------------------------------------------------------  No significant changes to PMH, PSH, FHx, SHx, unless otherwise noted    ALLERGIES & MEDICATIONS  --------------------------------------------------------------------------------  Allergies    alcohol swabs (Unknown)  IV contrast (Urticaria)  No Known Drug Allergies    Intolerances    adhesives (Rash)  alcohol wipes (Rash)    Standing Inpatient Medications  amLODIPine   Tablet 10 milliGRAM(s) Oral daily  apixaban 2.5 milliGRAM(s) Oral two times a day  BACItracin   Ointment 1 Application(s) Topical three times a day  dexlansoprazole DR 60 milliGRAM(s) Oral daily  doxercalciferol Injectable 4 MICROGram(s) IV Push <User Schedule>  hydrALAZINE 25 milliGRAM(s) Oral three times a day  labetalol 100 milliGRAM(s) Oral two times a day  losartan 100 milliGRAM(s) Oral daily  Nephro-terri 1 Tablet(s) Oral daily  nystatin    Suspension 348224 Unit(s) Swish and Swallow two times a day  polyethylene glycol 3350 17 Gram(s) Oral at bedtime  senna 2 Tablet(s) Oral at bedtime    PRN Inpatient Medications  acetaminophen     Tablet .. 975 milliGRAM(s) Oral every 8 hours PRN  benzocaine 15 mG/menthol 3.6 mG Lozenge 1 Lozenge Oral every 6 hours PRN  magnesium hydroxide Suspension 30 milliLiter(s) Oral daily PRN  melatonin 3 milliGRAM(s) Oral at bedtime PRN  ondansetron Injectable 4 milliGRAM(s) IV Push every 6 hours PRN  oxyCODONE    IR 2.5 milliGRAM(s) Oral every 4 hours PRN  oxyCODONE    IR 5 milliGRAM(s) Oral every 4 hours PRN      REVIEW OF SYSTEMS  --------------------------------------------------------------------------------  General: no fever  CVS: no chest pain  RESP: no sob, no cough  ABD: no abdominal pain  : no dysuria,  MSK: no edema     VITALS/PHYSICAL EXAM  --------------------------------------------------------------------------------  T(C): 36.7 (02-18-22 @ 09:28), Max: 37.2 (02-17-22 @ 21:06)  HR: 86 (02-18-22 @ 09:28) (72 - 86)  BP: 174/67 (02-18-22 @ 09:28) (153/53 - 174/67)  RR: 18 (02-18-22 @ 09:28) (16 - 18)  SpO2: 94% (02-18-22 @ 09:28) (93% - 97%)  Wt(kg): --        02-17-22 @ 07:01  -  02-18-22 @ 07:00  --------------------------------------------------------  IN: 1060 mL / OUT: 150 mL / NET: 910 mL    02-18-22 @ 07:01  -  02-18-22 @ 11:28  --------------------------------------------------------  IN: 240 mL / OUT: 0 mL / NET: 240 mL      Physical Exam:  	Gen: NAD  	HEENT: MMM  	Pulm: CTA B/L  	CV: S1S2  	Abd: Soft, +BS  	Ext: No LE edema B/L                      Neuro: Awake   	Skin: Warm and Dry   	Vascular access:avf           no  bonita  LABS/STUDIES  --------------------------------------------------------------------------------              11.0   3.16  >-----------<  168      [02-18-22 @ 09:58]              35.4     135  |  95  |  58  ----------------------------<  121      [02-18-22 @ 09:58]  4.6   |  23  |  6.48        Ca     9.1     [02-18-22 @ 09:58]      Phos  5.4     [02-18-22 @ 08:46]            Creatinine Trend:  SCr 6.48 [02-18 @ 09:58]  SCr 4.76 [02-17 @ 10:09]  SCr 6.78 [02-16 @ 10:18]  SCr 5.33 [02-15 @ 09:57]  SCr 7.12 [02-14 @ 10:58]        PTH -- (Ca 8.6)      [02-16-22 @ 15:58]   948    HBsAb Reactive      [02-16-22 @ 14:52]  HBsAg Nonreact      [02-16-22 @ 14:52]    
Patient is a 81y old  Female who presents with a chief complaint of Left Femoral Neck fracture (14 Feb 2022 11:16)    Date of servie : 02-14-22 @ 13:22  INTERVAL HPI/OVERNIGHT EVENTS:  T(C): 36.9 (02-14-22 @ 12:05), Max: 36.9 (02-14-22 @ 12:05)  HR: 82 (02-14-22 @ 12:05) (69 - 82)  BP: 178/66 (02-14-22 @ 12:05) (144/54 - 178/66)  RR: 18 (02-14-22 @ 12:05) (16 - 18)  SpO2: 97% (02-14-22 @ 12:05) (94% - 98%)  Wt(kg): --  I&O's Summary    13 Feb 2022 07:01  -  14 Feb 2022 07:00  --------------------------------------------------------  IN: 780 mL / OUT: 0 mL / NET: 780 mL    14 Feb 2022 07:01  -  14 Feb 2022 13:22  --------------------------------------------------------  IN: 0 mL / OUT: 1500 mL / NET: -1500 mL        LABS:                        11.9   6.43  )-----------( 107      ( 14 Feb 2022 10:58 )             37.9     02-14    128<L>  |  89<L>  |  71<H>  ----------------------------<  104<H>  4.7   |  18<L>  |  7.12<H>    Ca    8.2<L>      14 Feb 2022 10:58          CAPILLARY BLOOD GLUCOSE                MEDICATIONS  (STANDING):  acetaminophen     Tablet .. 650 milliGRAM(s) Oral every 6 hours  amLODIPine   Tablet 10 milliGRAM(s) Oral daily  dexlansoprazole DR 60 milliGRAM(s) Oral daily  enoxaparin Injectable 30 milliGRAM(s) SubCutaneous daily  labetalol 100 milliGRAM(s) Oral two times a day  losartan 100 milliGRAM(s) Oral daily  polyethylene glycol 3350 17 Gram(s) Oral at bedtime  senna 2 Tablet(s) Oral at bedtime    MEDICATIONS  (PRN):  benzocaine 15 mG/menthol 3.6 mG Lozenge 1 Lozenge Oral every 6 hours PRN Sore Throat  bisacodyl Suppository 10 milliGRAM(s) Rectal once PRN Constipation  magnesium hydroxide Suspension 30 milliLiter(s) Oral daily PRN Constipation  melatonin 3 milliGRAM(s) Oral at bedtime PRN Insomnia  ondansetron Injectable 4 milliGRAM(s) IV Push every 6 hours PRN Nausea and/or Vomiting  oxyCODONE    IR 2.5 milliGRAM(s) Oral every 4 hours PRN Moderate Pain (4 - 6)  oxyCODONE    IR 5 milliGRAM(s) Oral every 4 hours PRN Severe Pain (7 - 10)          PHYSICAL EXAM:  GENERAL: frail  CHEST/LUNG: Clear to percussion bilaterally; No rales, rhonchi, wheezing, or rubs  HEART: Regular rate and rhythm; No murmurs, rubs, or gallops  ABDOMEN: Soft, Nontender, Nondistended; Bowel sounds present  EXTREMITIES: edema +    Care Discussed with Consultants/Other Providers [x ] YES  [ ] NO
[facility] [place] [facility]   MD MODESTO HENSON MD RUORU WONG, PA    TEL:  FROM 9 AM to 5 PM ---OFFICE: [phone]    FROM 5 PM - 9 AM PLEASE CALL ANSWERING SERVICE: [phone]    RENAL FOLLOW UP NOTE--Date of Service 02-15-22 @ 08:14  --------------------------------------------------------------------------------  HPI:  Pt seen and examined at bedside.       PAST HISTORY  --------------------------------------------------------------------------------  No significant changes to PMH, PSH, FHx, SHx, unless otherwise noted    ALLERGIES & MEDICATIONS  --------------------------------------------------------------------------------  Allergies    alcohol swabs (Unknown)  IV contrast (Urticaria)  No Known Drug Allergies    Intolerances    adhesives (Rash)  alcohol wipes (Rash)    Standing Inpatient Medications  acetaminophen     Tablet .. 650 milliGRAM(s) Oral every 6 hours  amLODIPine   Tablet 10 milliGRAM(s) Oral daily  BACItracin   Ointment 1 Application(s) Topical three times a day  dexlansoprazole DR 60 milliGRAM(s) Oral daily  enoxaparin Injectable 30 milliGRAM(s) SubCutaneous daily  labetalol 100 milliGRAM(s) Oral two times a day  losartan 100 milliGRAM(s) Oral daily  Nephro-terri 1 Tablet(s) Oral daily  polyethylene glycol 3350 17 Gram(s) Oral at bedtime  senna 2 Tablet(s) Oral at bedtime    PRN Inpatient Medications  benzocaine 15 mG/menthol 3.6 mG Lozenge 1 Lozenge Oral every 6 hours PRN  bisacodyl Suppository 10 milliGRAM(s) Rectal once PRN  magnesium hydroxide Suspension 30 milliLiter(s) Oral daily PRN  melatonin 3 milliGRAM(s) Oral at bedtime PRN  ondansetron Injectable 4 milliGRAM(s) IV Push every 6 hours PRN  oxyCODONE    IR 2.5 milliGRAM(s) Oral every 4 hours PRN  oxyCODONE    IR 5 milliGRAM(s) Oral every 4 hours PRN      REVIEW OF SYSTEMS  --------------------------------------------------------------------------------  General: no fever,  MSK: no edema     VITALS/PHYSICAL EXAM  --------------------------------------------------------------------------------  T(C): 36.8 (02-15-22 @ 04:53), Max: 37.2 (02-14-22 @ 21:05)  HR: 81 (02-15-22 @ 04:53) (79 - 99)  BP: 151/63 (02-15-22 @ 04:53) (145/60 - 189/63)  RR: 18 (02-15-22 @ 04:53) (17 - 18)  SpO2: 94% (02-15-22 @ 04:53) (92% - 97%)  Wt(kg): --        02-14-22 @ 07:01  -  02-15-22 @ 07:00  --------------------------------------------------------  IN: 660 mL / OUT: 1500 mL / NET: -840 mL      Physical Exam:  	Gen: NAD  	HEENT: MMM  	Pulm: CTA B/L  	CV: S1S2  	Abd: Soft, +BS  	Ext: No LE edema B/L                      Neuro: Awake   	Skin: Warm and Dry   	Vascular access: avf          JORDAN mesa  LABS/STUDIES  --------------------------------------------------------------------------------              11.9   6.43  >-----------<  107      [02-14-22 @ 10:58]              37.9     128  |  89  |  71  ----------------------------<  104      [02-14-22 @ 10:58]  4.7   |  18  |  7.12        Ca     8.2     [02-14-22 @ 10:58]            Creatinine Trend:  SCr 7.12 [02-14 @ 10:58]  SCr 5.89 [02-13 @ 07:38]  SCr 4.78 [02-12 @ 12:48]  SCr 4.53 [02-12 @ 04:50]  SCr 6.66 [02-11 @ 12:47]            
[facility] [place] [facility]   MD MODESTO HENSON PA MIJUNG SHIN NP     TEL:  OFFICE: [phone]  DR OSEGUERA CELL: [phone]  DR. MORENO CELL: [phone]  CHARU ROWLAND CELL: [phone]  NP Makenna Encarnacion CELL: [phone]    From 5pm-7am Answering Service [phone]    -- RENAL FOLLOW UP NOTE ---Date of Service 02-13-22 @ 11:18    Patient is a 81y old  Female who presents with a chief complaint of Left Femoral Neck hip fracture (13 Feb 2022 08:41)      Patient seen and examined at bedside. No chest pain/sob    VITALS:  T(F): 98.5 (02-13-22 @ 09:32), Max: 98.5 (02-13-22 @ 09:32)  HR: 67 (02-13-22 @ 09:32)  BP: 133/49 (02-13-22 @ 09:32)  RR: 18 (02-13-22 @ 09:32)  SpO2: 95% (02-13-22 @ 09:32)  Wt(kg): --    02-12 @ 07:01  -  02-13 @ 07:00  --------------------------------------------------------  IN: 360 mL / OUT: 0 mL / NET: 360 mL          PHYSICAL EXAM:  Constitutional: NAD, resting comfortably  Neck: No JVD  Respiratory: CTAB, no wheezes, rales or rhonchi  Cardiovascular: S1, S2, RRR  Gastrointestinal: BS+, soft, NT/ND  Extremities: no calf tenderness, no cyanosis, no gross deficits of sensation  Skin: Lt Le dressing C/D/I  Neurology: [id number] Medications:   MEDICATIONS  (STANDING):  acetaminophen     Tablet .. 650 milliGRAM(s) Oral every 6 hours  amLODIPine   Tablet 10 milliGRAM(s) Oral daily  enoxaparin Injectable 30 milliGRAM(s) SubCutaneous daily  hydrALAZINE 25 milliGRAM(s) Oral four times a day  labetalol 100 milliGRAM(s) Oral two times a day  lactated ringers Bolus 500 milliLiter(s) IV Bolus once  losartan 100 milliGRAM(s) Oral daily  pantoprazole    Tablet 40 milliGRAM(s) Oral before breakfast  polyethylene glycol 3350 17 Gram(s) Oral at bedtime  senna 2 Tablet(s) Oral at bedtime  sodium zirconium cyclosilicate 10 Gram(s) Oral two times a day  sucralfate 1 Gram(s) Oral four times a day      LABS:  02-13    135  |  93<L>  |  49<H>  ----------------------------<  114<H>  5.6<H>   |  22  |  5.89<H>    Ca    9.3      13 Feb 2022 07:38    TPro  6.5  /  Alb  4.3  /  TBili  0.7  /  DBili      /  AST  15  /  ALT  10  /  AlkPhos  169<H>  02-12    Creatinine Trend: 5.89 <--, 4.78 <--, 4.53 <--, 6.66 <--, 6.26 <--                                12.8   5.29  )-----------( 103      ( 13 Feb 2022 07:38 )             41.2     Urine Studies:            RADIOLOGY & ADDITIONAL STUDIES:  
Patient is a 81y old  Female who presents with a chief complaint of Left Femoral Neck hip fracture   Patient s/p left hip hemiarthroplasty POD#4  Patient comfortable, c/o sore throat    T(C): 36.4 (02-16-22 @ 04:28), Max: 36.8 (02-15-22 @ 20:35)  HR: 72 (02-16-22 @ 04:28) (66 - 81)  BP: 158/57 (02-16-22 @ 04:28) (135/70 - 158/57)  RR: 18 (02-16-22 @ 04:28) (18 - 18)  SpO2: 95% (02-16-22 @ 04:28) (93% - 97%)    PHYSICAL EXAM:  NAD, Alert  [Left ] Hip: Aquacel dressing C/D/I;  (+) DF/PF; (+) Distal Pulses; No Calf tenderness B/L, PAS   LABS:                      11.6   4.18  )-----------( 92       ( 15 Feb 2022 09:57 )             36.6   02-15  136  |  96  |  42<H>  ----------------------------<  110<H>  3.9   |  23  |  5.33<H>  Ca    8.6      15 Feb 2022 09:57        
Post op Day [7]    Patient resting without complaints.  No chest pain, SOB, N/V.    T(C): 36.6 (02-19-22 @ 04:51), Max: 37.1 (02-19-22 @ 00:38)  HR: 76 (02-19-22 @ 04:51) (76 - 90)  BP: 150/54 (02-19-22 @ 04:51) (150/54 - 180/63)  RR: 18 (02-19-22 @ 04:51) (18 - 18)  SpO2: 93% (02-19-22 @ 04:51) (93% - 98%)  Wt(kg): --    Exam:  Alert and Oriented, No Acute Distress    Lower Extremities: L Hip  Hip Abduction pillow in place  Incisional dressing: Aquacel with mild serosanguinous spotting, Anterior distal thigh with tefla and tegaderm c/d/i for blister protection.  Calves Soft, Non-tender bilaterally  +PF/DF/EHL/FHL  SILT  +DP Pulse                              11.0   3.16  )-----------( 168      ( 18 Feb 2022 09:58 )             35.4    02-18    135  |  95<L>  |  58<H>  ----------------------------<  121<H>  4.6   |  23  |  6.48<H>    Ca    9.1      18 Feb 2022 09:58  Phos  5.4     02-18        
Patient is a 81y old  Female who presents with a chief complaint of Left Femoral Neck fracture (12 Feb 2022 16:47)      POST OPERATIVE DAY #:  [1 ]   Patient comfortable  Upper sorbian speaking  No complaints    VS:  T(C): 36.7 (02-13-22 @ 04:51), Max: 36.8 (02-12-22 @ 09:52)  T(F): 98 (02-13-22 @ 04:51), Max: 98.3 (02-12-22 @ 09:52)  HR: 66 (02-13-22 @ 04:51) (58 - 80)  BP: 149/69 (02-13-22 @ 04:51) (106/53 - 171/55)  RR: 18 (02-13-22 @ 04:51) (14 - 18)  SpO2: 95% (02-13-22 @ 04:51) (93% - 100%)  Wt(kg): --      PHYSICAL EXAM:  NAD, Alert  EXT:   LLE    [x] Aquacel Dressing C/D/I  ABD pillow in place  No Calf Tenderness  (+) DF/PF;/EHL/FHL w/ light touch  Sensation: No gross deficits noted  Pulses [  2+]    digits: Perfused / warm ]   B/L, PAS     LABS:                        12.7   5.01  )-----------( 90<L>    ( 12 Feb 2022 12:48 )             42.0     02-12    138  |  99  |  29<H>  ----------------------------<  83  4.7   |  23  |  4.78<H>            
Post op Day [5 ]    Patient resting, still has some discomfort with her throat/swallowing.  Also has some left heel pain.  No chest pain, SOB, N/V.    T(C): 36.8 (02-17-22 @ 04:50), Max: 36.9 (02-16-22 @ 16:04)  HR: 86 (02-17-22 @ 04:50) (75 - 87)  BP: 174/67 (02-17-22 @ 04:50) (134/83 - 175/63)  RR: 18 (02-17-22 @ 04:50) (17 - 18)  SpO2: 93% (02-17-22 @ 04:50) (93% - 97%)  Wt(kg): --    Exam:  Alert and Oriented, No Acute Distress  Oral exam: Mouth dry but unimpressive for obvious thrush/erythema/errosions  L Hip aquacel mild serosang drainage, soft/compressible compartments.  abduction pillow replaced appropriate, heel elevated  Calves Soft, Non-tender bilaterally  +PF/DF/EHL/FHL  SILT  PT/DP pulse dopperable, foot warm, pink, well-perfused                          11.2   3.60  )-----------( 117      ( 16 Feb 2022 10:14 )             35.4    02-16    134<L>  |  94<L>  |  67<H>  ----------------------------<  107<H>  4.2   |  22  |  6.78<H>    Ca    8.7      16 Feb 2022 10:18        
82 yo female with PMH for ESRD on HD MWF, HTN presents with complaint of left hip pain and inability to walk sp falling backwards when getting up from stool at 12:30am today.  No head trauma, no neck pain.  Pain with ROM of left hip. [facility]. Patient was found to have a left femoral neck fracture. She is s/p an Open reduction and internal fixation of the left femur. Patient tolerated procedure well.       MEDICATIONS  (STANDING):  acetaminophen     Tablet .. 650 milliGRAM(s) Oral every 6 hours  amLODIPine   Tablet 10 milliGRAM(s) Oral daily  ceFAZolin   IVPB 2000 milliGRAM(s) IV Intermittent every 8 hours  hydrALAZINE 25 milliGRAM(s) Oral four times a day  labetalol 100 milliGRAM(s) Oral two times a day  lactated ringers Bolus 500 milliLiter(s) IV Bolus once  lactated ringers Bolus 500 milliLiter(s) IV Bolus once  lactated ringers Bolus 500 milliLiter(s) IV Bolus once  losartan 100 milliGRAM(s) Oral daily  pantoprazole    Tablet 40 milliGRAM(s) Oral before breakfast  polyethylene glycol 3350 17 Gram(s) Oral at bedtime  senna 2 Tablet(s) Oral at bedtime  sucralfate 1 Gram(s) Oral four times a day    MEDICATIONS  (PRN):  magnesium hydroxide Suspension 30 milliLiter(s) Oral daily PRN Constipation  melatonin 3 milliGRAM(s) Oral at bedtime PRN Insomnia  ondansetron Injectable 4 milliGRAM(s) IV Push every 6 hours PRN Nausea and/or Vomiting  oxyCODONE    IR 2.5 milliGRAM(s) Oral every 4 hours PRN Moderate Pain (4 - 6)  oxyCODONE    IR 5 milliGRAM(s) Oral every 4 hours PRN Severe Pain (7 - 10)          VITALS:   T(C): 36.4 (02-12-22 @ 15:00), Max: 37.1 (02-12-22 @ 04:20)  HR: 61 (02-12-22 @ 15:00) (58 - 80)  BP: 146/56 (02-12-22 @ 15:00) (106/53 - 171/55)  RR: 16 (02-12-22 @ 15:00) (14 - 20)  SpO2: 93% (02-12-22 @ 15:00) (93% - 100%)  Wt(kg): --    PHYSICAL EXAM:  GENERAL: NAD, well-groomed, well-developed  HEAD:  Atraumatic, Normocephalic  EYES: EOMI, PERRLA, conjunctiva and sclera clear  ENMT: No tonsillar erythema, exudates, or enlargement; Moist mucous membranes, Good dentition, No lesions  NECK: Supple, No JVD, Normal thyroid  NERVOUS SYSTEM:  Alert & Oriented X3, Good concentration; Motor Strength 5/5 B/L upper and lower extremities; DTRs 2+ intact and symmetric  CHEST/LUNG: Clear to percussion bilaterally; No rales, rhonchi, wheezing, or rubs  HEART: Regular rate and rhythm; No murmurs, rubs, or gallops  ABDOMEN: Soft, Nontender, Nondistended; Bowel sounds present  EXTREMITIES:  2+ Peripheral Pulses, No clubbing, cyanosis, or edema  LYMPH: No lymphadenopathy noted  SKIN: No rashes or lesions    LABS:        CBC Full  -  ( 12 Feb 2022 12:48 )  WBC Count : 5.01 K/uL  RBC Count : 4.38 M/uL  Hemoglobin : 12.7 g/dL  Hematocrit : 42.0 %  Platelet Count - Automated : 90 K/uL  Mean Cell Volume : 95.9 fl  Mean Cell Hemoglobin : 29.0 pg  Mean Cell Hemoglobin Concentration : 30.2 gm/dL  Auto Neutrophil # : x  Auto Lymphocyte # : x  Auto Monocyte # : x  Auto Eosinophil # : x  Auto Basophil # : x  Auto Neutrophil % : x  Auto Lymphocyte % : x  Auto Monocyte % : x  Auto Eosinophil % : x  Auto Basophil % : x    02-12    138  |  99  |  29<H>  ----------------------------<  83  4.7   |  23  |  4.78<H>    Ca    8.7      12 Feb 2022 12:48    TPro  6.5  /  Alb  4.3  /  TBili  0.7  /  DBili  x   /  AST  15  /  ALT  10  /  AlkPhos  169<H>  02-12    LIVER FUNCTIONS - ( 12 Feb 2022 04:50 )  Alb: 4.3 g/dL / Pro: 6.5 g/dL / ALK PHOS: 169 U/L / ALT: 10 U/L / AST: 15 U/L / GGT: x           PT/INR - ( 12 Feb 2022 04:50 )   PT: 11.0 sec;   INR: 0.91 ratio         PTT - ( 12 Feb 2022 04:50 )  PTT:36.2 sec    CAPILLARY BLOOD GLUCOSE          RADIOLOGY & ADDITIONAL TESTS:

## 2022-02-22 NOTE — PROGRESS NOTE ADULT - NUTRITIONAL ASSESSMENT
This patient has been assessed with a concern for Malnutrition and has been determined to have a diagnosis/diagnoses of Severe protein-calorie malnutrition and Underweight (BMI < 19).    This patient is being managed with:   Diet Renal Restrictions-  For patients receiving Renal Replacement - No Protein Restr No Conc K No Conc Phos Low Sodium  Pureed (PUREED)  Supplement Feeding Modality:  Oral  Nepro Cans or Servings Per Day:  1       Frequency:  Two Times a day  Entered: Feb 14 2022  3:59PM    

## 2022-02-22 NOTE — PROGRESS NOTE ADULT - REASON FOR ADMISSION
Left Femoral Neck fracture

## 2022-03-15 LAB — SURGICAL PATHOLOGY STUDY: SIGNIFICANT CHANGE UP

## 2022-03-24 ENCOUNTER — INPATIENT (INPATIENT)
Facility: HOSPITAL | Age: 82
LOS: 1 days | Discharge: HOME HEALTH | DRG: 291 | End: 2022-03-26
Attending: INTERNAL MEDICINE | Admitting: INTERNAL MEDICINE
Payer: MEDICARE

## 2022-03-24 VITALS
DIASTOLIC BLOOD PRESSURE: 70 MMHG | SYSTOLIC BLOOD PRESSURE: 160 MMHG | HEIGHT: 63 IN | RESPIRATION RATE: 20 BRPM | HEART RATE: 70 BPM | OXYGEN SATURATION: 97 % | WEIGHT: 99.21 LBS | TEMPERATURE: 98 F

## 2022-03-24 DIAGNOSIS — D64.9 ANEMIA, UNSPECIFIED: ICD-10-CM

## 2022-03-24 LAB
ALBUMIN SERPL ELPH-MCNC: 4.5 G/DL — SIGNIFICANT CHANGE UP (ref 3.3–5)
ALP SERPL-CCNC: 129 U/L — HIGH (ref 40–120)
ALT FLD-CCNC: 8 U/L — LOW (ref 10–45)
ANION GAP SERPL CALC-SCNC: 14 MMOL/L — SIGNIFICANT CHANGE UP (ref 5–17)
ANISOCYTOSIS BLD QL: SLIGHT — SIGNIFICANT CHANGE UP
APTT BLD: 29.7 SEC — SIGNIFICANT CHANGE UP (ref 27.5–35.5)
AST SERPL-CCNC: 18 U/L — SIGNIFICANT CHANGE UP (ref 10–40)
BASOPHILS # BLD AUTO: 0.08 K/UL — SIGNIFICANT CHANGE UP (ref 0–0.2)
BASOPHILS NFR BLD AUTO: 2.6 % — HIGH (ref 0–2)
BILIRUB SERPL-MCNC: 0.4 MG/DL — SIGNIFICANT CHANGE UP (ref 0.2–1.2)
BLD GP AB SCN SERPL QL: NEGATIVE — SIGNIFICANT CHANGE UP
BUN SERPL-MCNC: 17 MG/DL — SIGNIFICANT CHANGE UP (ref 7–23)
CALCIUM SERPL-MCNC: 9.9 MG/DL — SIGNIFICANT CHANGE UP (ref 8.4–10.5)
CHLORIDE SERPL-SCNC: 92 MMOL/L — LOW (ref 96–108)
CO2 SERPL-SCNC: 29 MMOL/L — SIGNIFICANT CHANGE UP (ref 22–31)
CREAT SERPL-MCNC: 3.14 MG/DL — HIGH (ref 0.5–1.3)
EGFR: 14 ML/MIN/1.73M2 — LOW
ELLIPTOCYTES BLD QL SMEAR: SLIGHT — SIGNIFICANT CHANGE UP
EOSINOPHIL # BLD AUTO: 0.08 K/UL — SIGNIFICANT CHANGE UP (ref 0–0.5)
EOSINOPHIL NFR BLD AUTO: 2.6 % — SIGNIFICANT CHANGE UP (ref 0–6)
FERRITIN SERPL-MCNC: 869 NG/ML — HIGH (ref 15–150)
GLUCOSE SERPL-MCNC: 95 MG/DL — SIGNIFICANT CHANGE UP (ref 70–99)
HCT VFR BLD CALC: 21.3 % — LOW (ref 34.5–45)
HCT VFR BLD CALC: 25.8 % — LOW (ref 34.5–45)
HGB BLD-MCNC: 6.6 G/DL — CRITICAL LOW (ref 11.5–15.5)
HGB BLD-MCNC: 8 G/DL — LOW (ref 11.5–15.5)
HYPOCHROMIA BLD QL: SLIGHT — SIGNIFICANT CHANGE UP
INR BLD: 0.99 RATIO — SIGNIFICANT CHANGE UP (ref 0.88–1.16)
IRON SATN MFR SERPL: 14 % — SIGNIFICANT CHANGE UP (ref 14–50)
IRON SATN MFR SERPL: 22 UG/DL — LOW (ref 30–160)
LYMPHOCYTES # BLD AUTO: 0.55 K/UL — LOW (ref 1–3.3)
LYMPHOCYTES # BLD AUTO: 17.6 % — SIGNIFICANT CHANGE UP (ref 13–44)
MAGNESIUM SERPL-MCNC: 2.2 MG/DL — SIGNIFICANT CHANGE UP (ref 1.6–2.6)
MANUAL SMEAR VERIFICATION: SIGNIFICANT CHANGE UP
MCHC RBC-ENTMCNC: 27.3 PG — SIGNIFICANT CHANGE UP (ref 27–34)
MCHC RBC-ENTMCNC: 27.5 PG — SIGNIFICANT CHANGE UP (ref 27–34)
MCHC RBC-ENTMCNC: 31 GM/DL — LOW (ref 32–36)
MCHC RBC-ENTMCNC: 31 GM/DL — LOW (ref 32–36)
MCV RBC AUTO: 88 FL — SIGNIFICANT CHANGE UP (ref 80–100)
MCV RBC AUTO: 88.7 FL — SIGNIFICANT CHANGE UP (ref 80–100)
MONOCYTES # BLD AUTO: 0.14 K/UL — SIGNIFICANT CHANGE UP (ref 0–0.9)
MONOCYTES NFR BLD AUTO: 4.4 % — SIGNIFICANT CHANGE UP (ref 2–14)
NEUTROPHILS # BLD AUTO: 2.27 K/UL — SIGNIFICANT CHANGE UP (ref 1.8–7.4)
NEUTROPHILS NFR BLD AUTO: 72.8 % — SIGNIFICANT CHANGE UP (ref 43–77)
NRBC # BLD: 0 /100 WBCS — SIGNIFICANT CHANGE UP (ref 0–0)
PLAT MORPH BLD: NORMAL — SIGNIFICANT CHANGE UP
PLATELET # BLD AUTO: 134 K/UL — LOW (ref 150–400)
PLATELET # BLD AUTO: 136 K/UL — LOW (ref 150–400)
POIKILOCYTOSIS BLD QL AUTO: SLIGHT — SIGNIFICANT CHANGE UP
POLYCHROMASIA BLD QL SMEAR: SLIGHT — SIGNIFICANT CHANGE UP
POTASSIUM SERPL-MCNC: 3.5 MMOL/L — SIGNIFICANT CHANGE UP (ref 3.5–5.3)
POTASSIUM SERPL-SCNC: 3.5 MMOL/L — SIGNIFICANT CHANGE UP (ref 3.5–5.3)
PROT SERPL-MCNC: 6.7 G/DL — SIGNIFICANT CHANGE UP (ref 6–8.3)
PROTHROM AB SERPL-ACNC: 11.4 SEC — SIGNIFICANT CHANGE UP (ref 10.5–13.4)
RBC # BLD: 2.42 M/UL — LOW (ref 3.8–5.2)
RBC # BLD: 2.91 M/UL — LOW (ref 3.8–5.2)
RBC # FLD: 18.1 % — HIGH (ref 10.3–14.5)
RBC # FLD: 19.4 % — HIGH (ref 10.3–14.5)
RBC BLD AUTO: ABNORMAL
RH IG SCN BLD-IMP: POSITIVE — SIGNIFICANT CHANGE UP
SARS-COV-2 RNA SPEC QL NAA+PROBE: SIGNIFICANT CHANGE UP
SODIUM SERPL-SCNC: 135 MMOL/L — SIGNIFICANT CHANGE UP (ref 135–145)
TIBC SERPL-MCNC: 152 UG/DL — LOW (ref 220–430)
TROPONIN T, HIGH SENSITIVITY RESULT: 108 NG/L — HIGH (ref 0–51)
TROPONIN T, HIGH SENSITIVITY RESULT: 117 NG/L — HIGH (ref 0–51)
UIBC SERPL-MCNC: 130 UG/DL — SIGNIFICANT CHANGE UP (ref 110–370)
WBC # BLD: 3.12 K/UL — LOW (ref 3.8–10.5)
WBC # BLD: 3.52 K/UL — LOW (ref 3.8–10.5)
WBC # FLD AUTO: 3.12 K/UL — LOW (ref 3.8–10.5)
WBC # FLD AUTO: 3.52 K/UL — LOW (ref 3.8–10.5)

## 2022-03-24 PROCEDURE — 71045 X-RAY EXAM CHEST 1 VIEW: CPT | Mod: 26

## 2022-03-24 PROCEDURE — 99285 EMERGENCY DEPT VISIT HI MDM: CPT | Mod: 25

## 2022-03-24 PROCEDURE — 93010 ELECTROCARDIOGRAM REPORT: CPT

## 2022-03-24 PROCEDURE — 36000 PLACE NEEDLE IN VEIN: CPT

## 2022-03-24 RX ORDER — OLMESARTAN MEDOXOMIL 5 MG/1
1 TABLET, FILM COATED ORAL
Qty: 0 | Refills: 0 | DISCHARGE

## 2022-03-24 RX ORDER — LABETALOL HCL 100 MG
1 TABLET ORAL
Qty: 0 | Refills: 0 | DISCHARGE

## 2022-03-24 RX ORDER — HEPARIN SODIUM 5000 [USP'U]/ML
5000 INJECTION INTRAVENOUS; SUBCUTANEOUS EVERY 8 HOURS
Refills: 0 | Status: DISCONTINUED | OUTPATIENT
Start: 2022-03-24 | End: 2022-03-26

## 2022-03-24 RX ORDER — FERRIC CITRATE 210 MG/1
0 TABLET, COATED ORAL
Qty: 0 | Refills: 0 | DISCHARGE

## 2022-03-24 RX ORDER — LOSARTAN POTASSIUM 100 MG/1
100 TABLET, FILM COATED ORAL DAILY
Refills: 0 | Status: DISCONTINUED | OUTPATIENT
Start: 2022-03-24 | End: 2022-03-26

## 2022-03-24 RX ORDER — LABETALOL HCL 100 MG
200 TABLET ORAL
Refills: 0 | Status: DISCONTINUED | OUTPATIENT
Start: 2022-03-24 | End: 2022-03-26

## 2022-03-24 RX ORDER — ESOMEPRAZOLE MAGNESIUM 40 MG/1
1 CAPSULE, DELAYED RELEASE ORAL
Qty: 0 | Refills: 0 | DISCHARGE

## 2022-03-24 RX ORDER — MULTIVIT-MIN/FERROUS GLUCONATE 9 MG/15 ML
0 LIQUID (ML) ORAL
Qty: 0 | Refills: 0 | DISCHARGE

## 2022-03-24 RX ORDER — AMLODIPINE BESYLATE 2.5 MG/1
10 TABLET ORAL DAILY
Refills: 0 | Status: DISCONTINUED | OUTPATIENT
Start: 2022-03-24 | End: 2022-03-26

## 2022-03-24 RX ORDER — LABETALOL HCL 100 MG
2 TABLET ORAL
Qty: 0 | Refills: 0 | DISCHARGE

## 2022-03-24 RX ORDER — AMLODIPINE BESYLATE 2.5 MG/1
1 TABLET ORAL
Qty: 0 | Refills: 0 | DISCHARGE

## 2022-03-24 RX ORDER — CALCIUM ACETATE 667 MG
667 TABLET ORAL
Refills: 0 | Status: DISCONTINUED | OUTPATIENT
Start: 2022-03-24 | End: 2022-03-26

## 2022-03-24 RX ORDER — HYDRALAZINE HCL 50 MG
1 TABLET ORAL
Qty: 0 | Refills: 0 | DISCHARGE

## 2022-03-24 RX ORDER — DEXLANSOPRAZOLE 30 MG/1
1 CAPSULE, DELAYED RELEASE ORAL
Qty: 0 | Refills: 0 | DISCHARGE

## 2022-03-24 RX ORDER — SUCRALFATE 1 G
1 TABLET ORAL
Qty: 0 | Refills: 0 | DISCHARGE

## 2022-03-24 RX ORDER — CALCIUM ACETATE 667 MG
1 TABLET ORAL
Qty: 0 | Refills: 0 | DISCHARGE

## 2022-03-24 RX ORDER — PANTOPRAZOLE SODIUM 20 MG/1
40 TABLET, DELAYED RELEASE ORAL
Refills: 0 | Status: DISCONTINUED | OUTPATIENT
Start: 2022-03-24 | End: 2022-03-26

## 2022-03-24 RX ADMIN — HEPARIN SODIUM 5000 UNIT(S): 5000 INJECTION INTRAVENOUS; SUBCUTANEOUS at 22:57

## 2022-03-24 NOTE — ED ADULT NURSE NOTE - OBJECTIVE STATEMENT
81 y female present to the ED c/o abnormal lab result. PMH of ESRD. Pt reports having labs done on Monday which indicated a hemoglobin of 7. Doctor instructed pt to come into ED due to reporting feeling weak, dizzy, and chest tightness. Reports symptoms improve with rest. Pt was recently hospitalized in february due to a fall and received a partial hip replacement. Pt is on M/W/F dialysis.  Denies N/V/D/ and fevers. Upon assessment, neuro intact, respirations even and unlabored, right leg has mild swelling pt reports it is normal for from recent surgery. Pt has shunt in left arm. 81 y female present to the ED c/o abnormal lab result. PMH of ESRD, shunt in left arm. On M/W/F dialysis.  Pt is A&Ox3 and Tajik speaking, translation by daughter at bedside. Pt reports having labs done on Monday which indicated a hemoglobin of 7. Doctor instructed pt to come into ED due to reporting feeling weak, dizzy, and chest tightness. Reports symptoms improve with rest. Pt was recently hospitalized in february due to a fall and received a partial hip replacement. Denies N/V/D/ and fevers. Upon assessment, neuro intact, respirations even and unlabored, right leg has mild swelling pt reports it is normal for from recent surgery. 81 y female present to the ED c/o abnormal lab result. PMH of ESRD, fistula in left arm, M/W/F dialysis.  Pt is A&Ox3 and Tajik speaking, translation by daughter at bedside. Pt reports having labs done on Monday which indicated a hemoglobin of 7. Doctor instructed pt to come into ED due to reporting feeling weak, dizzy, and chest tightness. Reports symptoms improve with rest. Pt was recently hospitalized in february due to a fall and received a partial hip replacement. Denies N/V/D/ and fevers. Upon assessment, neuro intact, respirations even and unlabored, right leg has mild swelling pt reports it is normal for from recent surgery.

## 2022-03-24 NOTE — ED PROVIDER NOTE - PHYSICAL EXAMINATION
CONSTITUTIONAL: NAD  SKIN: Warm dry, normal skin turgor  HEAD: NCAT  NECK: Supple; non tender. Full ROM.  CARD: RRR, no murmurs.  RESP: L sided crackles, no wheeze  ABD: soft, non-tender, non-distended, no rebound or guarding.  MSK: LLE edematous vs R, no calf tenderness, no edema  PSYCH: Cooperative, appropriate.

## 2022-03-24 NOTE — ED PROVIDER NOTE - CLINICAL SUMMARY MEDICAL DECISION MAKING FREE TEXT BOX
ESRD w/ outpatient anemia, sob, chest heaviness, fatigue likely 2/2 anemia, will likely initiate transfusion in ED however ISO crackles, may need dialysis w/ blood, cxr r/o pna vs overload state ESRD w/ outpatient anemia, sob, chest heaviness, fatigue likely 2/2 anemia, will likely initiate transfusion in ED however ISO crackles, may need dialysis w/ blood, cxr r/o pna vs overload state. ESRD w/ outpatient anemia, sob, chest heaviness, fatigue likely 2/2 anemia, will likely initiate transfusion in ED however ISO crackles, may need dialysis w/ blood, cxr r/o pna vs overload state.    Carlotta Camp MD - Attending Physician: PT here with fatigue, SOB, found to have Hb7 at HD today. Signs of fluid overload noted, will need transfusion  with HD, so will require admission. Labs, Xr, d/w Nephro

## 2022-03-24 NOTE — H&P ADULT - ASSESSMENT
81F pmh HTN, ESRD dialysis MWF recently discharged from rehab s/p L partial hip replacement 2/2 fall presents to the ED for fatigue, sob w/ exertion, chest tightness. Pt states that they were supposed to be receiving an erythropoietic drug but was unable to due to insurance issues, was restarted after discharge from rehab but outpt labs revealed a Hgb of 7.0, was told to come in for blood transfusion. Pt also had some chest discomfort yesterday. Symptoms improve with rest.    anemia  - iron studies  - fobt  - transfuse at HD to hgb over 8    ESRD  - HDas per nephrology    hrn  - c/w home meds    dvt px  - eliquis

## 2022-03-24 NOTE — H&P ADULT - NSHPLABSRESULTS_GEN_ALL_CORE
LABS:                        6.6    3.12  )-----------( 136      ( 24 Mar 2022 09:49 )             21.3     03-24    135  |  92<L>  |  17  ----------------------------<  95  3.5   |  29  |  3.14<H>    Ca    9.9      24 Mar 2022 09:09  Mg     2.2     03-24    TPro  6.7  /  Alb  4.5  /  TBili  0.4  /  DBili  x   /  AST  18  /  ALT  8<L>  /  AlkPhos  129<H>  03-24    PT/INR - ( 24 Mar 2022 09:49 )   PT: 11.4 sec;   INR: 0.99 ratio         PTT - ( 24 Mar 2022 09:49 )  PTT:29.7 sec          RADIOLOGY & ADDITIONAL TESTS:

## 2022-03-24 NOTE — ED ADULT TRIAGE NOTE - BRAND OF FIRST COVID-19 BOOSTER
[employer] How Severe Is Your Skin Lesion?: moderate Has Your Skin Lesion Been Treated?: not been treated Is This A New Presentation, Or A Follow-Up?: Skin Lesions

## 2022-03-24 NOTE — ED PROVIDER NOTE - NS ED ROS FT
Constitutional:  (-) fever, (-) chills, (-) lethargy  Eyes:  (-) eye pain (-) visual changes  ENMT: (-) nasal discharge, (-) sore throat. (-) neck pain or stiffness  Cardiac: (+) chest pain (-) palpitations  Respiratory:  (-) cough (-) respiratory distress +SOB w. exertion  GI:  (-) nausea (-) vomiting (-) diarrhea (-) abdominal pain.  :  (-) dysuria (-) frequency (-) burning.  MS:  (-) back pain (-) joint pain.  Neuro:  (-) headache (-) numbness (-) tingling (-) focal weakness  Skin:  (-) rash  Except as documented in the HPI,  all other systems are negative

## 2022-03-24 NOTE — H&P ADULT - HISTORY OF PRESENT ILLNESS
81F pmh HTN, ESRD dialysis MWF recently discharged from rehab s/p L partial hip replacement 2/2 fall presents to the ED for fatigue, sob w/ exertion, chest tightness. Pt states that they were supposed to be receiving an erythropoietic drug but was unable to due to insurance issues, was restarted after discharge from rehab but outpt labs revealed a Hgb of 7.0, was told to come in for blood transfusion. Pt also had some chest discomfort yesterday. Symptoms improve with rest.

## 2022-03-24 NOTE — ED PROCEDURE NOTE - ATTENDING CONTRIBUTION TO CARE
Attending MD Carlotta Camp: Risks, benefit and alternatives of procedure explained to patient and patient demonstrated verbal understanding and consent.  I was present during the key portions of the procedure. Patient tolerated procedure well without complications

## 2022-03-24 NOTE — ED ADULT NURSE NOTE - ISOLATION TYPE:
Patient seen in [facility] for Covid symptoms and has a Covid test scheduled for 1-3-21.      Patient denies fever, cough, shortness of breath.    Injured left ankle foot a few days ago and using a cane to walk.  Patient should be seen in next 24 hours and will go to .    Nancy Nuñez RN  [facility]    Additional Information    Negative: Serious injury with multiple fractures    Negative: [1] Major bleeding (e.g., actively dripping or spurting) AND [2] can't be stopped    Negative: Amputation    Negative: Looks like a dislocated joint (very crooked or deformed)    Negative: Sounds like a life-threatening emergency to the triager    Negative: Bullet wound, stabbed by knife, or other serious penetrating wound    Negative: Skin is split open or gaping (or length > 1/2 inch or 12 mm)    Negative: [1] Bleeding AND [2] won't stop after 10 minutes of direct pressure (using correct technique)    Negative: [1] Dirt in the wound AND [2] not removed with 15 minutes of scrubbing    Negative: Can't stand (bear weight) or walk    Negative: [1] Numbness (new loss of sensation) of toe(s) AND [2] present now    Negative: Sounds like a serious injury to the triager    Negative: [1] SEVERE pain AND [2] not improved 2 hours after pain medicine/ice packs    Negative: Suspicious history for the injury    [1] Limp when walking AND [2] due to a twisted ankle or foot    Protocols used: FOOT AND ANKLE INJURY-A-      
None

## 2022-03-24 NOTE — ED PROVIDER NOTE - CARE PLAN
1 Principal Discharge DX:	Anemia   Principal Discharge DX:	Symptomatic anemia  Secondary Diagnosis:	End stage renal disease

## 2022-03-24 NOTE — ED ADULT NURSE REASSESSMENT NOTE - NS ED NURSE REASSESS COMMENT FT1
Pt admitted, RTM, band on. Waiting to get transfused at dialysis. pt aware of POC, no complaints at this time.

## 2022-03-24 NOTE — ED ADULT TRIAGE NOTE - CHIEF COMPLAINT QUOTE
low hgb of 7 on Monday, c/o generalized weakness, fatigue, SOB  dialysis pt, was told to come in by her doctor if she felt worse

## 2022-03-24 NOTE — ED PROVIDER NOTE - PROGRESS NOTE DETAILS
Kavon Luna, DO PGY-2: Pt anemic, as per nepo note will receive blood @ HD today, concern for fluid overload w/ transfusion in ED. Kavon Luna, DO PGY-2: attempted to call pmd, office closed, specialist requesting nusrat

## 2022-03-24 NOTE — PATIENT PROFILE ADULT - FALL HARM RISK - HARM RISK INTERVENTIONS

## 2022-03-25 LAB
ANION GAP SERPL CALC-SCNC: 10 MMOL/L — SIGNIFICANT CHANGE UP (ref 5–17)
BUN SERPL-MCNC: 12 MG/DL — SIGNIFICANT CHANGE UP (ref 7–23)
CALCIUM SERPL-MCNC: 9.3 MG/DL — SIGNIFICANT CHANGE UP (ref 8.4–10.5)
CHLORIDE SERPL-SCNC: 98 MMOL/L — SIGNIFICANT CHANGE UP (ref 96–108)
CO2 SERPL-SCNC: 30 MMOL/L — SIGNIFICANT CHANGE UP (ref 22–31)
CREAT SERPL-MCNC: 2.58 MG/DL — HIGH (ref 0.5–1.3)
EGFR: 18 ML/MIN/1.73M2 — LOW
FOLATE SERPL-MCNC: >20 NG/ML — SIGNIFICANT CHANGE UP
GLUCOSE SERPL-MCNC: 88 MG/DL — SIGNIFICANT CHANGE UP (ref 70–99)
HBV CORE AB SER-ACNC: SIGNIFICANT CHANGE UP
HBV SURFACE AB SER-ACNC: 18.7 MIU/ML — SIGNIFICANT CHANGE UP
HBV SURFACE AB SER-ACNC: REACTIVE
HBV SURFACE AG SER-ACNC: SIGNIFICANT CHANGE UP
HCT VFR BLD CALC: 25.5 % — LOW (ref 34.5–45)
HCV AB S/CO SERPL IA: 0.12 S/CO — SIGNIFICANT CHANGE UP (ref 0–0.99)
HCV AB SERPL-IMP: SIGNIFICANT CHANGE UP
HGB BLD-MCNC: 8 G/DL — LOW (ref 11.5–15.5)
MAGNESIUM SERPL-MCNC: 2 MG/DL — SIGNIFICANT CHANGE UP (ref 1.6–2.6)
MCHC RBC-ENTMCNC: 27.5 PG — SIGNIFICANT CHANGE UP (ref 27–34)
MCHC RBC-ENTMCNC: 31.4 GM/DL — LOW (ref 32–36)
MCV RBC AUTO: 87.6 FL — SIGNIFICANT CHANGE UP (ref 80–100)
NRBC # BLD: 0 /100 WBCS — SIGNIFICANT CHANGE UP (ref 0–0)
PHOSPHATE SERPL-MCNC: 2.6 MG/DL — SIGNIFICANT CHANGE UP (ref 2.5–4.5)
PLATELET # BLD AUTO: 152 K/UL — SIGNIFICANT CHANGE UP (ref 150–400)
POTASSIUM SERPL-MCNC: 3.4 MMOL/L — LOW (ref 3.5–5.3)
POTASSIUM SERPL-SCNC: 3.4 MMOL/L — LOW (ref 3.5–5.3)
RBC # BLD: 2.91 M/UL — LOW (ref 3.8–5.2)
RBC # FLD: 18.3 % — HIGH (ref 10.3–14.5)
SARS-COV-2 RNA SPEC QL NAA+PROBE: SIGNIFICANT CHANGE UP
SODIUM SERPL-SCNC: 138 MMOL/L — SIGNIFICANT CHANGE UP (ref 135–145)
TSH SERPL-MCNC: 3.86 UIU/ML — SIGNIFICANT CHANGE UP (ref 0.27–4.2)
VIT B12 SERPL-MCNC: 1257 PG/ML — HIGH (ref 232–1245)
WBC # BLD: 3.3 K/UL — LOW (ref 3.8–10.5)
WBC # FLD AUTO: 3.3 K/UL — LOW (ref 3.8–10.5)

## 2022-03-25 RX ORDER — POTASSIUM CHLORIDE 20 MEQ
20 PACKET (EA) ORAL ONCE
Refills: 0 | Status: COMPLETED | OUTPATIENT
Start: 2022-03-25 | End: 2022-03-25

## 2022-03-25 RX ORDER — ACETAMINOPHEN 500 MG
650 TABLET ORAL EVERY 6 HOURS
Refills: 0 | Status: DISCONTINUED | OUTPATIENT
Start: 2022-03-25 | End: 2022-03-26

## 2022-03-25 RX ADMIN — Medication 667 MILLIGRAM(S): at 08:53

## 2022-03-25 RX ADMIN — Medication 200 MILLIGRAM(S): at 17:01

## 2022-03-25 RX ADMIN — Medication 200 MILLIGRAM(S): at 06:39

## 2022-03-25 RX ADMIN — HEPARIN SODIUM 5000 UNIT(S): 5000 INJECTION INTRAVENOUS; SUBCUTANEOUS at 06:39

## 2022-03-25 RX ADMIN — HEPARIN SODIUM 5000 UNIT(S): 5000 INJECTION INTRAVENOUS; SUBCUTANEOUS at 22:13

## 2022-03-25 RX ADMIN — AMLODIPINE BESYLATE 10 MILLIGRAM(S): 2.5 TABLET ORAL at 06:39

## 2022-03-25 RX ADMIN — HEPARIN SODIUM 5000 UNIT(S): 5000 INJECTION INTRAVENOUS; SUBCUTANEOUS at 14:16

## 2022-03-25 RX ADMIN — Medication 20 MILLIEQUIVALENT(S): at 22:16

## 2022-03-25 RX ADMIN — Medication 667 MILLIGRAM(S): at 17:01

## 2022-03-25 RX ADMIN — Medication 667 MILLIGRAM(S): at 12:40

## 2022-03-25 RX ADMIN — PANTOPRAZOLE SODIUM 40 MILLIGRAM(S): 20 TABLET, DELAYED RELEASE ORAL at 06:39

## 2022-03-25 RX ADMIN — LOSARTAN POTASSIUM 100 MILLIGRAM(S): 100 TABLET, FILM COATED ORAL at 06:39

## 2022-03-25 NOTE — CONSULT NOTE ADULT - SUBJECTIVE AND OBJECTIVE BOX
[facility]   MD MODESTO HENSON MD RUORU WONG, PA    TEL:  FROM 9 AM to 5 PM--OFFICE: [phone]    FROM 5 PM- 9 AM PLEASE CALL ANSWERING SERVICE AT [phone]    -- INITIAL RENAL CONSULT NOTE --- Date Of service 03-24-22 @ 10:19  --------------------------------------------------------------------------------  HPI:  81F pmh HTN, ESRD dialysis MWF recently discharged from rehab s/p L partial hip replacement 2/2 fall presents to the ED for fatigue, sob w/ exertion, chest tightness. Pt states that they were supposed to be receiving an micera  but was unable to due to insurance issues, was restarted after discharge from rehab but outpt labs revealed a Hgb of 7.0,  Nephrology consulted for ESRD management        PAST HISTORY  --------------------------------------------------------------------------------  PAST MEDICAL & SURGICAL HISTORY:  Dialysis patient    HTN (hypertension)    No significant past surgical history      FAMILY HISTORY:  No pertinent family history in first degree relatives      PAST SOCIAL HISTORY:    ALLERGIES & MEDICATIONS  --------------------------------------------------------------------------------  Allergies    alcohol swabs (Unknown)  IV contrast (Urticaria)  No Known Drug Allergies    Intolerances    adhesives (Rash)  alcohol wipes (Rash)    Standing Inpatient Medications    PRN Inpatient Medications      REVIEW OF SYSTEMS  --------------------------------------------------------------------------------  Gen: No fevers/chills  Skin: No rashes  Head/Eyes/Ears: Normal hearing,  Normal vision   Respiratory: + dyspnea, cough  CV: No chest pain  GI: No abdominal pain, diarrhea, constipation, nausea, vomiting  : No dysuria, hematuria  MSK: +  edema  Heme: No easy bruising or bleeding  Psych: No significant depression    All other systems were reviewed and are negative, except as noted.    VITALS/PHYSICAL EXAM  --------------------------------------------------------------------------------  T(C): 36.9 (03-24-22 @ 07:21), Max: 36.9 (03-24-22 @ 07:09)  HR: 68 (03-24-22 @ 07:21) (68 - 70)  BP: 154/47 (03-24-22 @ 07:21) (154/47 - 160/70)  RR: 18 (03-24-22 @ 07:21) (18 - 20)  SpO2: 94% (03-24-22 @ 07:21) (94% - 97%)  Wt(kg): --  Height (cm): 160 (03-24-22 @ 07:09)  Weight (kg): 45 (03-24-22 @ 07:09)  BMI (kg/m2): 17.6 (03-24-22 @ 07:09)  BSA (m2): 1.44 (03-24-22 @ 07:09)      Physical Exam:  	Gen: NAD  	HEENT: MMM  	Pulm: CTA B/L  	CV: S1S2  	Abd: Soft, +BS   	Ext: trace LLE   	Neuro: Awake, alert  	Skin: Warm and dry  	Vascular access: No HD catheter , AVF          :  no mesa  LABS/STUDIES  --------------------------------------------------------------------------------              6.6    3.12  >-----------<  136      [03-24-22 @ 09:49]              21.3     135  |  92  |  17  ----------------------------<  95      [03-24-22 @ 09:09]  3.5   |  29  |  3.14        Ca     9.9     [03-24-22 @ 09:09]      Mg     2.2     [03-24-22 @ 09:09]    TPro  6.7  /  Alb  4.5  /  TBili  0.4  /  DBili  x   /  AST  18  /  ALT  8   /  AlkPhos  129  [03-24-22 @ 09:09]    PT/INR: PT 11.4 , INR 0.99       [03-24-22 @ 09:49]  PTT: 29.7       [03-24-22 @ 09:49]      Creatinine Trend:  SCr 3.14 [03-24 @ 09:09]    Urinalysis - [10-11-20 @ 09:04]      Color LIGHT YELLOW / Appearance CLEAR / SG 1.010 / pH 8.0      Gluc 50 / Ketone NEGATIVE  / Bili NEGATIVE / Urobili NORMAL       Blood NEGATIVE / Protein 300 / Leuk Est NEGATIVE / Nitrite NEGATIVE      RBC 0-2 / WBC 0-2 / Hyaline NEGATIVE / Gran  / Sq Epi OCC / Non Sq Epi  / Bacteria NEGATIVE      PTH -- (Ca 8.6)      [02-16-22 @ 15:58]   948    HBsAb 16.0      [10-31-20 @ 10:30]  HBsAb Reactive      [02-16-22 @ 14:52]  HBsAg Nonreact      [02-16-22 @ 14:52]  HBcAb Nonreactive      [10-31-20 @ 10:30]  HCV 0.05, Nonreactive Hepatitis C AB  S/CO Ratio                        Interpretation  < 1.00                                   Non-Reactive  1.00 - 4.99                         Weakly-Reactive  >= 5.00                                Reactive  Non-Reactive: Aperson with a non-reactive HCV antibody  result is considered uninfected.  No further action is  needed unless recent infection is suspected.  In these  cases, consider repeat testing later to detect  seroconversion..  Weakly-Reactive: HCV antibody test is abnormal, HCV RNA  Qualitative test will follow.  Reactive: HCV antibody test is abnormal, HCV RNA  Qualitative test will follow.  Note: HCV antibody testing is performed on the [employer]   system.      [10-31-20 @ 10:30]    
Wound Surgery Consult Note:    HPI:   81F pmh HTN, ESRD dialysis MWF recently discharged from rehab s/p L partial hip replacement 2/2 fall presents to the ED for fatigue, sob w/ exertion, chest tightness. Pt states that they were supposed to be receiving an erythropoietic drug but was unable to due to insurance issues, was restarted after discharge from rehab but outpt labs revealed a Hgb of 7.0, was told to come in for blood transfusion. Pt also had some chest discomfort yesterday. Symptoms improve with rest. (24 Mar 2022 14:19)    Request for wound care consult for right upper anterior thigh wound received from nursing. Left hip surgical incision well-healed with no drainage, pain or s/s infection. Thigh wound appears to have a superficial scab with no s/s of infection. Healing at the edges are noted.    PAST MEDICAL & SURGICAL HISTORY:  Dialysis patient  HTN (hypertension)  No significant past surgical history    MEDICATIONS  (STANDING):  amLODIPine   Tablet 10 milliGRAM(s) Oral daily  calcium acetate 667 milliGRAM(s) Oral three times a day with meals  heparin   Injectable 5000 Unit(s) SubCutaneous every 8 hours  labetalol 200 milliGRAM(s) Oral two times a day  losartan 100 milliGRAM(s) Oral daily  pantoprazole    Tablet 40 milliGRAM(s) Oral before breakfast    MEDICATIONS  (PRN):    Allergies    alcohol swabs (Unknown)  IV contrast (Urticaria)  No Known Drug Allergies    Intolerances    adhesives (Rash)  alcohol wipes (Rash)    Vital Signs Last 24 Hrs  T(C): 36.4 (25 Mar 2022 06:35), Max: 36.9 (24 Mar 2022 19:04)  T(F): 97.5 (25 Mar 2022 06:35), Max: 98.4 (24 Mar 2022 19:04)  HR: 71 (25 Mar 2022 06:35) (62 - 71)  BP: 162/68 (25 Mar 2022 06:35) (133/54 - 165/55)  BP(mean): 80 (24 Mar 2022 13:16) (80 - 80)  RR: 18 (25 Mar 2022 06:35) (18 - 18)  SpO2: 95% (25 Mar 2022 06:35) (94% - 100%)    Physical Exam:  General: Alert, thin, NAD  Respiratory: no SOB on room air  Gastrointestinal: soft NT/ND  Neurology: weakened strength & sensation grossly intact  Musculoskeletal: no contractures  Vascular: no BLE edema  Skin:  Left anterior upper thigh wound L 2.5cm X W 1cm xD unknown with dry scab, no drainage, healing edges noted  No odor, increased warmth, tenderness, induration, fluctuance, erythema    LABS:  03-25    138  |  98  |  12  ----------------------------<  88  3.4<L>   |  30  |  2.58<H>    Ca    9.3      25 Mar 2022 07:22  Phos  2.6     03-25  Mg     2.0     03-25    TPro  6.7  /  Alb  4.5  /  TBili  0.4  /  DBili  x   /  AST  18  /  ALT  8<L>  /  AlkPhos  129<H>  03-24                          8.0    3.30  )-----------( 152      ( 25 Mar 2022 07:22 )             25.5     PT/INR - ( 24 Mar 2022 09:49 )   PT: 11.4 sec;   INR: 0.99 ratio         PTT - ( 24 Mar 2022 09:49 )  PTT:29.7 sec        
Cardiovascular Disease Initial Evaluation    CHIEF COMPLAINT: Weakness     HISTORY OF PRESENT ILLNESS: Ms. Spear is an 81F pmh HTN, ESRD dialysis MWF recently discharged from rehab s/p L partial hip replacement in February 2/2 fall presents to the ED for fatigue, sob w/ exertion, chest tightness. Pt states that they were supposed to be receiving an erythropoietic drug but was unable to due to insurance issues, was restarted after discharge from rehab but outpatient labs revealed a Hgb of 7.0, was told to come in for blood transfusion. Pt also had some chest discomfort yesterday. Symptoms improve with rest. Currently Ms. Spear is in no distress. Denies chest pain, or SOB.         Allergies    alcohol swabs (Unknown)  IV contrast (Urticaria)  No Known Drug Allergies    Intolerances    adhesives (Rash)  alcohol wipes (Rash)  	    MEDICATIONS:  amLODIPine   Tablet 10 milliGRAM(s) Oral daily  heparin   Injectable 5000 Unit(s) SubCutaneous every 8 hours  labetalol 200 milliGRAM(s) Oral two times a day  losartan 100 milliGRAM(s) Oral daily          pantoprazole    Tablet 40 milliGRAM(s) Oral before breakfast      calcium acetate 667 milliGRAM(s) Oral three times a day with meals      PAST MEDICAL & SURGICAL HISTORY:  Dialysis patient    HTN (hypertension)    No significant past surgical history        FAMILY HISTORY:  No pertinent family history in first degree relatives        SOCIAL HISTORY:    The patient is a nonsmoker       REVIEW OF SYSTEMS:  See HPI, otherwise complete 14 point review of systems negative    [x ] All others negative	  [ ] Unable to obtain    PHYSICAL EXAM:  T(C): 36.4 (03-25-22 @ 06:35), Max: 36.9 (03-24-22 @ 19:04)  HR: 71 (03-25-22 @ 06:35) (62 - 71)  BP: 162/68 (03-25-22 @ 06:35) (133/54 - 165/55)  RR: 18 (03-25-22 @ 06:35) (18 - 18)  SpO2: 95% (03-25-22 @ 06:35) (94% - 100%)  Wt(kg): --  I&O's Summary    24 Mar 2022 07:01  -  25 Mar 2022 07:00  --------------------------------------------------------  IN: 1340 mL / OUT: 2100 mL / NET: -760 mL        Appearance: No Acute Distress; resting comfortably  HEENT:  Normal oral mucosa, PERRL, EOMI	  Cardiovascular: Normal S1 S2, No JVD, No murmurs/rubs/gallops  Respiratory: Normal respiratory effort; Lungs clear to auscultation bilaterally  Gastrointestinal:  Soft, Non-tender, + BS	  Skin: No rashes, No ecchymoses, No cyanosis	  Neurologic: Non-focal; no weakness  Extremities: No clubbing, cyanosis or edema  Vascular: Peripheral pulses palpable 2+ bilaterally  Psychiatry: A & O x 3, Mood & affect appropriate    Laboratory Data:	 	    CBC Full  -  ( 25 Mar 2022 07:22 )  WBC Count : 3.30 K/uL  Hemoglobin : 8.0 g/dL  Hematocrit : 25.5 %  Platelet Count - Automated : 152 K/uL  Mean Cell Volume : 87.6 fl  Mean Cell Hemoglobin : 27.5 pg  Mean Cell Hemoglobin Concentration : 31.4 gm/dL  Auto Neutrophil # : x  Auto Lymphocyte # : x  Auto Monocyte # : x  Auto Eosinophil # : x  Auto Basophil # : x  Auto Neutrophil % : x  Auto Lymphocyte % : x  Auto Monocyte % : x  Auto Eosinophil % : x  Auto Basophil % : x    03-25    138  |  98  |  12  ----------------------------<  88  3.4<L>   |  30  |  2.58<H>  03-24    135  |  92<L>  |  17  ----------------------------<  95  3.5   |  29  |  3.14<H>    Ca    9.3      25 Mar 2022 07:22  Ca    9.9      24 Mar 2022 09:09  Phos  2.6     03-25  Mg     2.0     03-25  Mg     2.2     03-24    TPro  6.7  /  Alb  4.5  /  TBili  0.4  /  DBili  x   /  AST  18  /  ALT  8<L>  /  AlkPhos  129<H>  03-24      proBNP: Serum Pro-Brain Natriuretic Peptide: 67438 pg/mL (03-24 @ 09:09)    Lipid Profile:   HgA1c:   TSH:       CARDIAC MARKERS: trop t: 117-108            Interpretation of Telemetry: 	    ECG:  Sinus rhythm with 1st degree AV block  RADIOLOGY: CXR with bilateral pleural effusion  OTHER: 	    PREVIOUS DIAGNOSTIC TESTING:    [x ] Echocardiogram: 2/14/2022: LVEF 65-70% with mild concentric LVH, calcified aortic valve with no AS or AI, severely dilated LA, and small focal pericardial effusion with no evidence of tamponade.   [ ] Catheterization:  [ ] Stress Test:  	    Assessment:  81F pmh HTN, ESRD dialysis MWF recently discharged from rehab s/p L partial hip replacement in February 2/2 fall presents to the ED for fatigue, sob w/ exertion, chest tightness.    Plan of Care:    #Symptomatic anemia  - Symptomology 2/2 drop in hgb  - S/p transfusion and symptoms have improved  - ACS ruled out. Troponin elevated in setting of CKD  - Recent TTE reveals a preserved LVEF with a calcified aortic valve and no significant disease.   - No further cardiac testing warranted at this time.     #HTN  - BP acceptable for age  - Continue BB, norvasc and ARB.     #Acute decompensated diastolic heart failure  - CXR with bilateral pleural effusion   - Diuresis with HD    #ESRD   - HD as per nephro    #ACP (advance care planning)-  Advanced care planning was addressed. At this time will continue with current management outlined above.  Risks, benefits and alternatives of medical treatment and procedures were discussed in detail and all questions were answered. 30 minutes spent addressing advance care plans.          72 minutes spent on total encounter; more than 50% of the visit was spent counseling and/or coordinating care by the attending physician.   	  Adrian Denise D.O.   Cardiovascular Diseases  [phone]

## 2022-03-25 NOTE — CONSULT NOTE ADULT - ASSESSMENT
81F pmh HTN, ESRD dialysis MWF recently discharged from rehab s/p L partial hip replacement 2/2 fall presents to the ED for fatigue, sob w/ exertion, chest tightness. Pt states that they were supposed to be receiving an micera  but was unable to due to insurance issues, was restarted after discharge from rehab but outpt labs revealed a Hgb of 7.0,  Nephrology consulted for ESRD management    ESRD on HD ( MWF) via AVF  Outpt unit [facility]   Consent obtained for HD   s/p L Hemiarthroplasty 02/12  Plan for HD today with PRBC  Monitor BMP and BP        Anemia  Does not tolerate Epogen/ aranesp  On Mircera 200mg Bi Monthly  PRBC today    HTN  Bp  fluctuating  Monitor BP    CKD -BMD  Check PTH  Monitor serum calcium and PO4 daily   
Impression:    Left anterior upper thigh wound  Pressure Injury Prophylaxis    Recommend:  1.) topical therapy: Left anterior upper thigh wound - cleanse with NS, pat dry, apply DSD daily  2.) Incontinence Management - incontinence cleanser, pads, pericare BID, avoid diapers  3.) Maintain on an alternating air with low air loss surface  4.) Turn and reposition Q 2 hours  5.) Nutrition optimization  6.) Offload heels/feet with complete cair air fluidized boots; ensure that the soles of the feet are not resting on the foot board of the bed.  7.) chair cushion for chair sitting    Care as per medicine. Will not actively follow but will remain available. Please recall for new issues of deterioration.  Upon discharge [facility] [address] [place] [address] [phone]  Thank you for this consult  Inge Avila, NP-C, CWOCN [id number]

## 2022-03-26 ENCOUNTER — TRANSCRIPTION ENCOUNTER (OUTPATIENT)
Age: 82
End: 2022-03-26

## 2022-03-26 VITALS
WEIGHT: 115.3 LBS | TEMPERATURE: 99 F | HEART RATE: 67 BPM | RESPIRATION RATE: 18 BRPM | OXYGEN SATURATION: 96 % | SYSTOLIC BLOOD PRESSURE: 176 MMHG | DIASTOLIC BLOOD PRESSURE: 87 MMHG

## 2022-03-26 LAB
ALBUMIN SERPL ELPH-MCNC: 3.8 G/DL — SIGNIFICANT CHANGE UP (ref 3.3–5)
ALP SERPL-CCNC: 108 U/L — SIGNIFICANT CHANGE UP (ref 40–120)
ALT FLD-CCNC: 6 U/L — LOW (ref 10–45)
ANION GAP SERPL CALC-SCNC: 10 MMOL/L — SIGNIFICANT CHANGE UP (ref 5–17)
AST SERPL-CCNC: 12 U/L — SIGNIFICANT CHANGE UP (ref 10–40)
BILIRUB SERPL-MCNC: 0.5 MG/DL — SIGNIFICANT CHANGE UP (ref 0.2–1.2)
BUN SERPL-MCNC: 29 MG/DL — HIGH (ref 7–23)
CALCIUM SERPL-MCNC: 9.2 MG/DL — SIGNIFICANT CHANGE UP (ref 8.4–10.5)
CHLORIDE SERPL-SCNC: 98 MMOL/L — SIGNIFICANT CHANGE UP (ref 96–108)
CO2 SERPL-SCNC: 30 MMOL/L — SIGNIFICANT CHANGE UP (ref 22–31)
CREAT SERPL-MCNC: 4.18 MG/DL — HIGH (ref 0.5–1.3)
EGFR: 10 ML/MIN/1.73M2 — LOW
GLUCOSE SERPL-MCNC: 89 MG/DL — SIGNIFICANT CHANGE UP (ref 70–99)
HCT VFR BLD CALC: 25.2 % — LOW (ref 34.5–45)
HGB BLD-MCNC: 7.8 G/DL — LOW (ref 11.5–15.5)
MAGNESIUM SERPL-MCNC: 2.1 MG/DL — SIGNIFICANT CHANGE UP (ref 1.6–2.6)
MCHC RBC-ENTMCNC: 27.6 PG — SIGNIFICANT CHANGE UP (ref 27–34)
MCHC RBC-ENTMCNC: 31 GM/DL — LOW (ref 32–36)
MCV RBC AUTO: 89 FL — SIGNIFICANT CHANGE UP (ref 80–100)
NRBC # BLD: 0 /100 WBCS — SIGNIFICANT CHANGE UP (ref 0–0)
PHOSPHATE SERPL-MCNC: 3 MG/DL — SIGNIFICANT CHANGE UP (ref 2.5–4.5)
PLATELET # BLD AUTO: 137 K/UL — LOW (ref 150–400)
POTASSIUM SERPL-MCNC: 3.9 MMOL/L — SIGNIFICANT CHANGE UP (ref 3.5–5.3)
POTASSIUM SERPL-SCNC: 3.9 MMOL/L — SIGNIFICANT CHANGE UP (ref 3.5–5.3)
PROT SERPL-MCNC: 5.6 G/DL — LOW (ref 6–8.3)
RBC # BLD: 2.83 M/UL — LOW (ref 3.8–5.2)
RBC # FLD: 18.9 % — HIGH (ref 10.3–14.5)
SODIUM SERPL-SCNC: 138 MMOL/L — SIGNIFICANT CHANGE UP (ref 135–145)
WBC # BLD: 2.97 K/UL — LOW (ref 3.8–10.5)
WBC # FLD AUTO: 2.97 K/UL — LOW (ref 3.8–10.5)

## 2022-03-26 PROCEDURE — 86704 HEP B CORE ANTIBODY TOTAL: CPT

## 2022-03-26 PROCEDURE — 83550 IRON BINDING TEST: CPT

## 2022-03-26 PROCEDURE — 84484 ASSAY OF TROPONIN QUANT: CPT

## 2022-03-26 PROCEDURE — 86850 RBC ANTIBODY SCREEN: CPT

## 2022-03-26 PROCEDURE — 86900 BLOOD TYPING SEROLOGIC ABO: CPT

## 2022-03-26 PROCEDURE — 36415 COLL VENOUS BLD VENIPUNCTURE: CPT

## 2022-03-26 PROCEDURE — 36430 TRANSFUSION BLD/BLD COMPNT: CPT

## 2022-03-26 PROCEDURE — 99285 EMERGENCY DEPT VISIT HI MDM: CPT | Mod: 25

## 2022-03-26 PROCEDURE — 83540 ASSAY OF IRON: CPT

## 2022-03-26 PROCEDURE — 97162 PT EVAL MOD COMPLEX 30 MIN: CPT

## 2022-03-26 PROCEDURE — 86706 HEP B SURFACE ANTIBODY: CPT

## 2022-03-26 PROCEDURE — P9016: CPT

## 2022-03-26 PROCEDURE — 80048 BASIC METABOLIC PNL TOTAL CA: CPT

## 2022-03-26 PROCEDURE — 84443 ASSAY THYROID STIM HORMONE: CPT

## 2022-03-26 PROCEDURE — 86901 BLOOD TYPING SEROLOGIC RH(D): CPT

## 2022-03-26 PROCEDURE — 85610 PROTHROMBIN TIME: CPT

## 2022-03-26 PROCEDURE — 82728 ASSAY OF FERRITIN: CPT

## 2022-03-26 PROCEDURE — 83880 ASSAY OF NATRIURETIC PEPTIDE: CPT

## 2022-03-26 PROCEDURE — 80053 COMPREHEN METABOLIC PANEL: CPT

## 2022-03-26 PROCEDURE — U0005: CPT

## 2022-03-26 PROCEDURE — 86923 COMPATIBILITY TEST ELECTRIC: CPT

## 2022-03-26 PROCEDURE — 85025 COMPLETE CBC W/AUTO DIFF WBC: CPT

## 2022-03-26 PROCEDURE — U0003: CPT

## 2022-03-26 PROCEDURE — 71045 X-RAY EXAM CHEST 1 VIEW: CPT

## 2022-03-26 PROCEDURE — 99261: CPT

## 2022-03-26 PROCEDURE — 85730 THROMBOPLASTIN TIME PARTIAL: CPT

## 2022-03-26 PROCEDURE — 82746 ASSAY OF FOLIC ACID SERUM: CPT

## 2022-03-26 PROCEDURE — 84100 ASSAY OF PHOSPHORUS: CPT

## 2022-03-26 PROCEDURE — 93005 ELECTROCARDIOGRAM TRACING: CPT

## 2022-03-26 PROCEDURE — 86803 HEPATITIS C AB TEST: CPT

## 2022-03-26 PROCEDURE — 87340 HEPATITIS B SURFACE AG IA: CPT

## 2022-03-26 PROCEDURE — 83735 ASSAY OF MAGNESIUM: CPT

## 2022-03-26 PROCEDURE — 82607 VITAMIN B-12: CPT

## 2022-03-26 PROCEDURE — 85027 COMPLETE CBC AUTOMATED: CPT

## 2022-03-26 RX ORDER — ACETAMINOPHEN 500 MG
2 TABLET ORAL
Qty: 0 | Refills: 0 | DISCHARGE
Start: 2022-03-26

## 2022-03-26 RX ADMIN — PANTOPRAZOLE SODIUM 40 MILLIGRAM(S): 20 TABLET, DELAYED RELEASE ORAL at 05:54

## 2022-03-26 RX ADMIN — Medication 200 MILLIGRAM(S): at 17:57

## 2022-03-26 RX ADMIN — Medication 667 MILLIGRAM(S): at 17:57

## 2022-03-26 RX ADMIN — AMLODIPINE BESYLATE 10 MILLIGRAM(S): 2.5 TABLET ORAL at 05:51

## 2022-03-26 RX ADMIN — Medication 200 MILLIGRAM(S): at 05:51

## 2022-03-26 RX ADMIN — Medication 667 MILLIGRAM(S): at 08:19

## 2022-03-26 RX ADMIN — Medication 667 MILLIGRAM(S): at 13:20

## 2022-03-26 RX ADMIN — HEPARIN SODIUM 5000 UNIT(S): 5000 INJECTION INTRAVENOUS; SUBCUTANEOUS at 17:57

## 2022-03-26 RX ADMIN — LOSARTAN POTASSIUM 100 MILLIGRAM(S): 100 TABLET, FILM COATED ORAL at 05:51

## 2022-03-26 RX ADMIN — HEPARIN SODIUM 5000 UNIT(S): 5000 INJECTION INTRAVENOUS; SUBCUTANEOUS at 05:52

## 2022-03-26 NOTE — DISCHARGE NOTE NURSING/CASE MANAGEMENT/SOCIAL WORK - NSSCCARECORD_GEN_ALL_CORE
[facility]q Pregnancy And Lactation Text: The risk during pregnancy and breastfeeding is uncertain with this medication.

## 2022-03-26 NOTE — PROGRESS NOTE ADULT - SUBJECTIVE AND OBJECTIVE BOX
Cardiovascular Disease Progress Note    Overnight events: No acute events overnight.  Mr. Spear denies chest pain or SOB.   Otherwise review of systems negative    Objective Findings:  T(C): 36.6 (03-26-22 @ 09:34), Max: 36.8 (03-26-22 @ 05:45)  HR: 66 (03-26-22 @ 09:34) (64 - 74)  BP: 147/64 (03-26-22 @ 09:34) (133/54 - 169/53)  RR: 18 (03-26-22 @ 09:34) (17 - 18)  SpO2: 97% (03-26-22 @ 09:34) (93% - 97%)  Wt(kg): --  Daily     Daily       Physical Exam:  Gen: NAD; Patient resting comfortably  HEENT: EOMI, Normocephalic/ atraumatic  CV: RRR, normal S1 + S2, no m/r/g  Lungs:  Normal respiratory effort; clear to auscultation bilaterally  Abd: soft, non-tender; bowel sounds present  Ext: No edema; warm and well perfused    Telemetry: N/a    Laboratory Data:                        7.8    2.97  )-----------( 137      ( 26 Mar 2022 07:09 )             25.2     03-26    138  |  98  |  29<H>  ----------------------------<  89  3.9   |  30  |  4.18<H>    Ca    9.2      26 Mar 2022 07:04  Phos  3.0     03-26  Mg     2.1     03-26    TPro  5.6<L>  /  Alb  3.8  /  TBili  0.5  /  DBili  x   /  AST  12  /  ALT  6<L>  /  AlkPhos  108  03-26              Inpatient Medications:  MEDICATIONS  (STANDING):  amLODIPine   Tablet 10 milliGRAM(s) Oral daily  calcium acetate 667 milliGRAM(s) Oral three times a day with meals  heparin   Injectable 5000 Unit(s) SubCutaneous every 8 hours  labetalol 200 milliGRAM(s) Oral two times a day  losartan 100 milliGRAM(s) Oral daily  pantoprazole    Tablet 40 milliGRAM(s) Oral before breakfast      Assessment: 81F pmh HTN, ESRD dialysis MWF recently discharged from rehab s/p L partial hip replacement in February 2/2 fall presents to the ED for fatigue, sob w/ exertion, chest tightness.    Plan of Care:    #Symptomatic anemia  - S/p transfusion and symptoms have improved  - ACS ruled out. Troponin elevated in setting of CKD  - Recent TTE reveals a preserved LVEF with a calcified aortic valve and no significant disease.   - No further cardiac testing warranted at this time.     #HTN  - BP acceptable for age  - Continue BB, norvasc and ARB.     #Acute decompensated diastolic heart failure  - Volume status improving  - CXR with bilateral pleural effusion   - Diuresis with HD    #ESRD   - HD as per nephro      Plan of Care:          Over 25 minutes spent on total encounter; more than 50% of the visit was spent counseling and/or coordinating care by the attending physician.      Adrian Denise D.O.   Cardiovascular Disease  [phone]
DATE OF SERVICE: 03-25-22 @ 08:42    Patient is a 81y old  Female who presents with a chief complaint of anemia and weakness (25 Mar 2022 08:17)      SUBJECTIVE / OVERNIGHT EVENTS:  No chest pain. No shortness of breath. No complaints. No events overnight.     MEDICATIONS  (STANDING):  amLODIPine   Tablet 10 milliGRAM(s) Oral daily  calcium acetate 667 milliGRAM(s) Oral three times a day with meals  heparin   Injectable 5000 Unit(s) SubCutaneous every 8 hours  labetalol 200 milliGRAM(s) Oral two times a day  losartan 100 milliGRAM(s) Oral daily  pantoprazole    Tablet 40 milliGRAM(s) Oral before breakfast    MEDICATIONS  (PRN):      Vital Signs Last 24 Hrs  T(C): 36.4 (25 Mar 2022 06:35), Max: 36.9 (24 Mar 2022 19:04)  T(F): 97.5 (25 Mar 2022 06:35), Max: 98.4 (24 Mar 2022 19:04)  HR: 71 (25 Mar 2022 06:35) (62 - 71)  BP: 162/68 (25 Mar 2022 06:35) (133/54 - 165/55)  BP(mean): 80 (24 Mar 2022 13:16) (80 - 80)  RR: 18 (25 Mar 2022 06:35) (18 - 18)  SpO2: 95% (25 Mar 2022 06:35) (94% - 100%)  CAPILLARY BLOOD GLUCOSE        I&O's Summary    24 Mar 2022 07:01  -  25 Mar 2022 07:00  --------------------------------------------------------  IN: 1340 mL / OUT: 2100 mL / NET: -760 mL        PHYSICAL EXAM:  GENERAL: NAD, well-developed  HEAD:  Atraumatic, Normocephalic  EYES: EOMI, PERRLA, conjunctiva and sclera clear  NECK: Supple, No JVD  CHEST/LUNG: Clear to auscultation bilaterally; No wheeze  HEART: Regular rate and rhythm; No murmurs, rubs, or gallops  ABDOMEN: Soft, Nontender, Nondistended; Bowel sounds present  EXTREMITIES:  2+ Peripheral Pulses, No clubbing, cyanosis, or edema  PSYCH: AAOx3  NEUROLOGY: non-focal  SKIN: No rashes or lesions    LABS:                        8.0    3.30  )-----------( 152      ( 25 Mar 2022 07:22 )             25.5     03-25    138  |  98  |  12  ----------------------------<  88  3.4<L>   |  30  |  2.58<H>    Ca    9.3      25 Mar 2022 07:22  Phos  2.6     03-25  Mg     2.0     03-25    TPro  6.7  /  Alb  4.5  /  TBili  0.4  /  DBili  x   /  AST  18  /  ALT  8<L>  /  AlkPhos  129<H>  03-24    PT/INR - ( 24 Mar 2022 09:49 )   PT: 11.4 sec;   INR: 0.99 ratio         PTT - ( 24 Mar 2022 09:49 )  PTT:29.7 sec          RADIOLOGY & ADDITIONAL TESTS:    Imaging Personally Reviewed:    Consultant(s) Notes Reviewed:      Care Discussed with Consultants/Other Providers:  
DATE OF SERVICE: 03-26-22 @ 10:38    Patient is a 81y old  Female who presents with a chief complaint of anemia and weakness (26 Mar 2022 09:49)      SUBJECTIVE / OVERNIGHT EVENTS:  No chest pain. No shortness of breath. No complaints. No events overnight.     MEDICATIONS  (STANDING):  amLODIPine   Tablet 10 milliGRAM(s) Oral daily  calcium acetate 667 milliGRAM(s) Oral three times a day with meals  heparin   Injectable 5000 Unit(s) SubCutaneous every 8 hours  labetalol 200 milliGRAM(s) Oral two times a day  losartan 100 milliGRAM(s) Oral daily  pantoprazole    Tablet 40 milliGRAM(s) Oral before breakfast    MEDICATIONS  (PRN):  acetaminophen     Tablet .. 650 milliGRAM(s) Oral every 6 hours PRN Temp greater or equal to 38C (100.4F), Mild Pain (1 - 3)      Vital Signs Last 24 Hrs  T(C): 36.6 (26 Mar 2022 09:34), Max: 36.8 (26 Mar 2022 05:45)  T(F): 97.9 (26 Mar 2022 09:34), Max: 98.3 (26 Mar 2022 05:45)  HR: 64 (26 Mar 2022 10:16) (64 - 74)  BP: 144/63 (26 Mar 2022 10:16) (133/54 - 169/53)  BP(mean): --  RR: 18 (26 Mar 2022 09:34) (17 - 18)  SpO2: 97% (26 Mar 2022 10:16) (93% - 97%)  CAPILLARY BLOOD GLUCOSE        I&O's Summary    25 Mar 2022 07:01  -  26 Mar 2022 07:00  --------------------------------------------------------  IN: 540 mL / OUT: 0 mL / NET: 540 mL        PHYSICAL EXAM:  GENERAL: NAD, well-developed  HEAD:  Atraumatic, Normocephalic  EYES: EOMI, PERRLA, conjunctiva and sclera clear  NECK: Supple, No JVD  CHEST/LUNG: Clear to auscultation bilaterally; No wheeze  HEART: Regular rate and rhythm; No murmurs, rubs, or gallops  ABDOMEN: Soft, Nontender, Nondistended; Bowel sounds present  EXTREMITIES:  2+ Peripheral Pulses, No clubbing, cyanosis, or edema  PSYCH: AAOx3  NEUROLOGY: non-focal  SKIN: No rashes or lesions    LABS:                        7.8    2.97  )-----------( 137      ( 26 Mar 2022 07:09 )             25.2     03-26    138  |  98  |  29<H>  ----------------------------<  89  3.9   |  30  |  4.18<H>    Ca    9.2      26 Mar 2022 07:04  Phos  3.0     03-26  Mg     2.1     03-26    TPro  5.6<L>  /  Alb  3.8  /  TBili  0.5  /  DBili  x   /  AST  12  /  ALT  6<L>  /  AlkPhos  108  03-26              RADIOLOGY & ADDITIONAL TESTS:    Imaging Personally Reviewed:    Consultant(s) Notes Reviewed:      Care Discussed with Consultants/Other Providers:  
[facility]   MD MODESTO HENSON MD RUORU WONG, PA    TEL:  FROM 9 AM to 5 PM ---OFFICE: [phone]    FROM 5 PM - 9 AM PLEASE CALL ANSWERING SERVICE: [phone]    RENAL FOLLOW UP NOTE--Date of Service 03-25-22 @ 08:56  --------------------------------------------------------------------------------  HPI:      Pt seen and examined at bedside.   Essence SOB, chest pain     PAST HISTORY  --------------------------------------------------------------------------------  No significant changes to PMH, PSH, FHx, SHx, unless otherwise noted    ALLERGIES & MEDICATIONS  --------------------------------------------------------------------------------  Allergies    alcohol swabs (Unknown)  IV contrast (Urticaria)  No Known Drug Allergies    Intolerances    adhesives (Rash)  alcohol wipes (Rash)    Standing Inpatient Medications  amLODIPine   Tablet 10 milliGRAM(s) Oral daily  calcium acetate 667 milliGRAM(s) Oral three times a day with meals  heparin   Injectable 5000 Unit(s) SubCutaneous every 8 hours  labetalol 200 milliGRAM(s) Oral two times a day  losartan 100 milliGRAM(s) Oral daily  pantoprazole    Tablet 40 milliGRAM(s) Oral before breakfast    PRN Inpatient Medications      REVIEW OF SYSTEMS  --------------------------------------------------------------------------------  General: no fever  CVS: no chest pain    MSK: no edema     VITALS/PHYSICAL EXAM  --------------------------------------------------------------------------------  T(C): 36.4 (03-25-22 @ 06:35), Max: 36.9 (03-24-22 @ 19:04)  HR: 71 (03-25-22 @ 06:35) (62 - 71)  BP: 162/68 (03-25-22 @ 06:35) (133/54 - 165/55)  RR: 18 (03-25-22 @ 06:35) (18 - 18)  SpO2: 95% (03-25-22 @ 06:35) (94% - 100%)  Wt(kg): --  Height (cm): 160 (03-24-22 @ 07:09)  Weight (kg): 45 (03-24-22 @ 07:09)  BMI (kg/m2): 17.6 (03-24-22 @ 07:09)  BSA (m2): 1.44 (03-24-22 @ 07:09)      03-24-22 @ 07:01  -  03-25-22 @ 07:00  --------------------------------------------------------  IN: 1340 mL / OUT: 2100 mL / NET: -760 mL      Physical Exam:  	Gen: NAD  	HEENT: MMM  	Pulm: CTA B/L  	CV: S1S2  	Abd: Soft, +BS  	Ext: No LE edema B/L                      Neuro: Awake   	Skin: Warm and Dry   	Vascular access: avf           no  mesa  LABS/STUDIES  --------------------------------------------------------------------------------              8.0    3.30  >-----------<  152      [03-25-22 @ 07:22]              25.5     138  |  98  |  12  ----------------------------<  88      [03-25-22 @ 07:22]  3.4   |  30  |  2.58        Ca     9.3     [03-25-22 @ 07:22]      Mg     2.0     [03-25-22 @ 07:22]      Phos  2.6     [03-25-22 @ 07:22]    TPro  6.7  /  Alb  4.5  /  TBili  0.4  /  DBili  x   /  AST  18  /  ALT  8   /  AlkPhos  129  [03-24-22 @ 09:09]    PT/INR: PT 11.4 , INR 0.99       [03-24-22 @ 09:49]  PTT: 29.7       [03-24-22 @ 09:49]      Creatinine Trend:  SCr 2.58 [03-25 @ 07:22]  SCr 3.14 [03-24 @ 09:09]        Iron 22, TIBC 152, %sat 14      [03-24-22 @ 14:28]  Ferritin 869      [03-24-22 @ 14:31]  PTH -- (Ca 8.6)      [02-16-22 @ 15:58]   948    HBsAb 18.7      [03-25-22 @ 00:19]  HBsAb Reactive      [03-25-22 @ 00:19]  HBsAg Nonreact      [03-25-22 @ 00:19]  HBcAb Nonreact      [03-25-22 @ 00:19]  HCV 0.12, Nonreact      [03-25-22 @ 00:19]    
[facility]   MD MODESTO HENSON PA MIJUNG SHIN NP     TEL:  OFFICE: [phone]  DR OSEGUERA CELL: [phone]  DR. MORENO CELL: [phone]  CHARU ROWLAND CELL: [phone]  NP Makenna Encarnacion CELL: [phone]    From 5pm-7am Answering Service [phone]    -- RENAL FOLLOW UP NOTE ---Date of Service 03-26-22 @ 11:05    Patient is a 81y old  Female who presents with a chief complaint of anemia and weakness (26 Mar 2022 10:36)      Patient seen and examined at bedside. No chest pain/sob    VITALS:  T(F): 97.9 (03-26-22 @ 09:34), Max: 98.3 (03-26-22 @ 05:45)  HR: 64 (03-26-22 @ 10:16)  BP: 144/63 (03-26-22 @ 10:16)  RR: 18 (03-26-22 @ 09:34)  SpO2: 97% (03-26-22 @ 10:16)  Wt(kg): --    03-25 @ 07:01  -  03-26 @ 07:00  --------------------------------------------------------  IN: 540 mL / OUT: 0 mL / NET: 540 mL          PHYSICAL EXAM:  Constitutional: NAD  Neck: No JVD  Respiratory: CTAB, no wheezes, rales or rhonchi  Cardiovascular: S1, S2, RRR  Gastrointestinal: BS+, soft, NT/ND  Extremities: No b/l LE edema  skin: warm and dry  Vascular access: AVF    Hospital Medications:   MEDICATIONS  (STANDING):  amLODIPine   Tablet 10 milliGRAM(s) Oral daily  calcium acetate 667 milliGRAM(s) Oral three times a day with meals  heparin   Injectable 5000 Unit(s) SubCutaneous every 8 hours  labetalol 200 milliGRAM(s) Oral two times a day  losartan 100 milliGRAM(s) Oral daily  pantoprazole    Tablet 40 milliGRAM(s) Oral before breakfast      LABS:  03-26    138  |  98  |  29<H>  ----------------------------<  89  3.9   |  30  |  4.18<H>    Ca    9.2      26 Mar 2022 07:04  Phos  3.0     03-26  Mg     2.1     03-26    TPro  5.6<L>  /  Alb  3.8  /  TBili  0.5  /  DBili      /  AST  12  /  ALT  6<L>  /  AlkPhos  108  03-26    Creatinine Trend: 4.18 <--, 2.58 <--, 3.14 <--    Albumin, Serum: 3.8 g/dL (03-26 @ 07:04)  Phosphorus Level, Serum: 3.0 mg/dL (03-26 @ 07:04)                              7.8    2.97  )-----------( 137      ( 26 Mar 2022 07:09 )             25.2     Urine Studies:      Iron 22, TIBC 152, %sat 14      [03-24-22 @ 14:28]  Ferritin 869      [03-24-22 @ 14:31]  PTH -- (Ca 8.6)      [02-16-22 @ 15:58]   948  TSH 3.86      [03-25-22 @ 09:05]    HBsAb 18.7      [03-25-22 @ 00:19]  HBsAb Reactive      [03-25-22 @ 00:19]  HBsAg Nonreact      [03-25-22 @ 00:19]  HBcAb Nonreact      [03-25-22 @ 00:19]  HCV 0.12, Nonreact      [03-25-22 @ 00:19]      RADIOLOGY & ADDITIONAL STUDIES:

## 2022-03-26 NOTE — DISCHARGE NOTE PROVIDER - NSDCMRMEDTOKEN_GEN_ALL_CORE_FT
acetaminophen 325 mg oral tablet: 2 tab(s) orally every 6 hours, As needed, Temp greater or equal to 38C (100.4F), Mild Pain (1 - 3)  amLODIPine 10 mg oral tablet: 1 tab(s) orally once a day  dexlansoprazole 60 mg oral delayed release capsule: 1 cap(s) orally once a day  labetalol 100 mg oral tablet: 2 tab(s) orally 2 times a day (morning, noon)  olmesartan 40 mg oral tablet: 1 tab(s) orally once a day (in the evening)  PhosLo 667 mg oral capsule: 1 cap(s) orally 3 times a day (with meals), As Needed  NOTE: per patient&#x27;s daughter, supply given by rehab upon discharge  PreserVision AREDS 2 oral capsule:   Shayna-Krystyna oral tablet: 1 tab(s) orally once a day

## 2022-03-26 NOTE — PHYSICAL THERAPY INITIAL EVALUATION ADULT - AMBULATION SKILLS, REHAB EVAL
"Coleen Rice is a 58 year old female who presents for:  Chief Complaint   Patient presents with     Oncology Clinic Visit     Follow up        Initial Vitals:  /72 (BP Location: Right arm, Patient Position: Chair, Cuff Size: Adult Large)  Pulse 73  Temp 99.8  F (37.7  C) (Tympanic)  Resp 16  Ht 1.651 m (5' 5\")  Wt 110.7 kg (244 lb)  LMP 12/01/2003  SpO2 96%  BMI 40.6 kg/m2 Estimated body mass index is 40.6 kg/(m^2) as calculated from the following:    Height as of this encounter: 1.651 m (5' 5\").    Weight as of this encounter: 110.7 kg (244 lb).. Body surface area is 2.25 meters squared. BP completed using cuff size: large  No Pain (0) Patient's last menstrual period was 12/01/2003. Allergies and medications reviewed.     Medications: Medication refills not needed today.  Pharmacy name entered into EPIC:    [facility] - [facility], [place] - [address] [place] [address] DRUG STORE [address] [place] [address] - [address] [place] [address] AT [place] [phone]    Comments: Follow up    8 minutes for nursing intake (face to face time)   Linda Palacio CMA     DISCHARGE PLAN:  Next appointments: See patient instruction section  Departure Mode: Ambulatory  Accompanied by: self  0 minutes for nursing discharge (face to face time)   Linda Palacio CMA            "
RW, household distances/independent/needs device

## 2022-03-26 NOTE — DISCHARGE NOTE PROVIDER - CARE PROVIDER_API CALL
Vicente Young  Family Practice  [address] [place] [address]  Phone: [phone]  Fax: [phone]  Follow Up Time: 1 week

## 2022-03-26 NOTE — DISCHARGE NOTE NURSING/CASE MANAGEMENT/SOCIAL WORK - PATIENT PORTAL LINK FT
You can access the FollowMyHealth Patient Portal offered by [facility] by registering at the following website: [email or web address]. By joining [facility]’s FollowMyHealth portal, you will also be able to view your health information using other applications (apps) compatible with our system.

## 2022-03-26 NOTE — PHYSICAL THERAPY INITIAL EVALUATION ADULT - ACTIVE RANGE OF MOTION EXAMINATION, REHAB EVAL
L post hip precautions observed/bilateral upper extremity Active ROM was WFL (within functional limits)/bilateral  lower extremity Active ROM was WFL (within functional limits)

## 2022-03-26 NOTE — PHYSICAL THERAPY INITIAL EVALUATION ADULT - ADDITIONAL COMMENTS
(continuation of H&P)  CXR: trace teresita pleural effusions and CM. Pt admitted for anemia and weakness.  Pt lives with his daughter in an apartment with + elevator, no steps to enter. (continuation of H&P)  CXR: trace teresita pleural effusions and CM. Pt admitted for anemia and weakness.  Pt lives with her daughter in an apartment with + elevator, one step to enter. Pt has DME at home, uses RW at present but was independent w/o device prior to fall in Feb. Pt has HD M, W,F.

## 2022-03-26 NOTE — PROGRESS NOTE ADULT - ASSESSMENT
81F pmh HTN, ESRD dialysis MWF recently discharged from rehab s/p L partial hip replacement 2/2 fall presents to the ED for fatigue, sob w/ exertion, chest tightness. Pt states that they were supposed to be receiving an erythropoietic drug but was unable to due to insurance issues, was restarted after discharge from rehab but outpt labs revealed a Hgb of 7.0, was told to come in for blood transfusion. Pt also had some chest discomfort yesterday. Symptoms improve with rest.    anemia  - iron studies  - fobt  - transfuse 1 more unit of prbc at hd today    chest pain 2/2 Symptomatic anemia  - Symptomology 2/2 drop in hgb  - S/p transfusion and symptoms have improved  - ACS ruled out. Troponin elevated in setting of CKD  - Recent TTE reveals a preserved LVEF with a calcified aortic valve and no significant disease.   - No further cardiac testing warranted at this time.     ESRD  - HD as per nephrology    hrn  - c/w home meds    dvt px  - eliquis      
81F pmh HTN, ESRD dialysis MWF recently discharged from rehab s/p L partial hip replacement 2/2 fall presents to the ED for fatigue, sob w/ exertion, chest tightness. Pt states that they were supposed to be receiving an micera  but was unable to due to insurance issues, was restarted after discharge from rehab but outpt labs revealed a Hgb of 7.0,  Nephrology consulted for ESRD management    ESRD on HD ( MWF) via AVF  Outpt unit [facility]   Consent obtained for HD   s/p L Hemiarthroplasty 02/12  s/p HD on 3/24 with 1 L UF  Plan for HD on Saturday then switch to MWF next week  Monitor BMP and BP        Anemia  Does not tolerate Epogen/ aranesp  On Mircera 200mg Bi Monthly  s/p PRBC  Monitor Hb    HTN  Bp  fluctuating  Monitor BP    CKD -BMD  Check PTH  Monitor serum calcium and PO4 daily   
81F pmh HTN, ESRD dialysis MWF recently discharged from rehab s/p L partial hip replacement 2/2 fall presents to the ED for fatigue, sob w/ exertion, chest tightness. Pt states that they were supposed to be receiving an micera  but was unable to due to insurance issues, was restarted after discharge from rehab but outpt labs revealed a Hgb of 7.0,  Nephrology consulted for ESRD management    ESRD on HD ( MWF) via AVF  Outpt unit [facility]   Consent obtained for HD   s/p L Hemiarthroplasty 02/12  s/p HD on 3/24 with 1 L UF  Plan for HD today then switch to MWF next week  Monitor BMP and BP        Anemia  Does not tolerate Epogen/ aranesp  On Mircera 200mg Bi Monthly  s/p PRBC 3/24, Hb increased from 6.6 to 8.0 then dropped to 7.8  ordered onother PRBC 1 unit during HD  Monitor Hb    HTN  Bp  fluctuating  Monitor BP    CKD -BMD  Check PTH  Monitor serum calcium and PO4 daily   
 81F pmh HTN, ESRD dialysis MWF recently discharged from rehab s/p L partial hip replacement 2/2 fall presents to the ED for fatigue, sob w/ exertion, chest tightness. Pt states that they were supposed to be receiving an erythropoietic drug but was unable to due to insurance issues, was restarted after discharge from rehab but outpt labs revealed a Hgb of 7.0, was told to come in for blood transfusion. Pt also had some chest discomfort yesterday. Symptoms improve with rest.    anemia  - iron studies  - fobt  - transfuse at HD to hgb over 8    chest pain 2/2 Symptomatic anemia  - Symptomology 2/2 drop in hgb  - S/p transfusion and symptoms have improved  - ACS ruled out. Troponin elevated in setting of CKD  - Recent TTE reveals a preserved LVEF with a calcified aortic valve and no significant disease.   - No further cardiac testing warranted at this time.     ESRD  - HDas per nephrology    hrn  - c/w home meds    dvt px  - eliquis

## 2022-03-26 NOTE — PHYSICAL THERAPY INITIAL EVALUATION ADULT - PERTINENT HX OF CURRENT PROBLEM, REHAB EVAL
81F pmh HTN, ESRD dialysis MWF recently d/c from rehab s/p L partial hip replacement 2/2 fall. Pt presents to the ED for fatigue, sob w/ exertion, chest tightness. Pt states that they were supposed to be receiving an erythropoietic drug but was unable to due to insurance issues, was restarted after d/c from rehab but outpt labs revealed a Hgb of 7.0, was told to come in for blood transfusion.

## 2022-03-26 NOTE — PHYSICAL THERAPY INITIAL EVALUATION ADULT - DISCHARGE DISPOSITION, PT EVAL
[FreeTextEntry1] : F/U VISIT OF 60 Y OLD MALE WITH PMX OF HTN = STABLE ,LABS\par DYSLIPIDEMIA = ABS\par VIT D INSUF= CONTINUE 2000 IU VIT D3 DAILY \par RTO FOR CPE IN 4 M\par PSA TODAY SINCE NOT DONE IN OVER 1 Y home w/ home PT

## 2022-03-26 NOTE — DISCHARGE NOTE PROVIDER - NSDCCPCAREPLAN_GEN_ALL_CORE_FT
PRINCIPAL DISCHARGE DIAGNOSIS  Diagnosis: Anemia  Assessment and Plan of Treatment: You were sent to the hospital for a low blood count (Anemia).  You recieved 2 units of blood while in the hospital.  Your blood count should be checked regularly on dialysis days.       Symptoms to report, bleeding, palpitations, fatigue, pale skin, cold skin, dizziness.

## 2022-03-26 NOTE — DISCHARGE NOTE PROVIDER - HOSPITAL COURSE
81F pmh HTN, ESRD dialysis MWF recently discharged from rehab s/p L partial hip replacement 2/2 fall presents to the ED for fatigue, sob w/ exertion, chest tightness. Pt states that they were supposed to be receiving MIRCERA (long acting erythropetin)  but was unable to due to insurance issues, was restarted after discharge from rehab but outpt labs revealed a Hgb of  6.6    ESRD  Pt receives HD on MW in the outpatient setting.  Tolerated TTS schedule while inpatient.  Pt to return to [facility] upon discharge.     ANEMIA  Pt received 1U PRBC during HD on 3/24 for Hgb 6.6.  Will receive second unit in HD prior to discharge on 3/26 for Hgb 7.8.    Case discussed with attending.  Given patient's improved clinical status and current hemodynamic stability, the decision was made for discharge.    This is a summary of the patients hospitalization.  For full hospital course, please see medical record.

## 2022-05-16 ENCOUNTER — TRANSCRIPTION ENCOUNTER (OUTPATIENT)
Age: 82
End: 2022-05-16

## 2022-10-13 ENCOUNTER — NEW PATIENT (OUTPATIENT)
Dept: URBAN - METROPOLITAN AREA CLINIC 19 | Facility: CLINIC | Age: 82
End: 2022-10-13

## 2022-10-13 DIAGNOSIS — H35.3211: ICD-10-CM

## 2022-10-13 DIAGNOSIS — H43.392: ICD-10-CM

## 2022-10-13 DIAGNOSIS — E11.9: ICD-10-CM

## 2022-10-13 DIAGNOSIS — H43.811: ICD-10-CM

## 2022-10-13 DIAGNOSIS — H35.3122: ICD-10-CM

## 2022-10-13 PROCEDURE — 92134 CPTRZ OPH DX IMG PST SGM RTA: CPT

## 2022-10-13 PROCEDURE — 92202 OPSCPY EXTND ON/MAC DRAW: CPT | Mod: 59

## 2022-10-13 PROCEDURE — 99204 OFFICE O/P NEW MOD 45 MIN: CPT | Mod: 25

## 2022-10-13 PROCEDURE — 67028 INJECTION EYE DRUG: CPT

## 2022-10-13 ASSESSMENT — TONOMETRY
OD_IOP_MMHG: 18
OS_IOP_MMHG: 17

## 2022-10-13 ASSESSMENT — VISUAL ACUITY
OS_SC: 20/40+3
OD_SC: 20/160-1

## 2022-11-29 ENCOUNTER — FOLLOW UP (OUTPATIENT)
Dept: URBAN - METROPOLITAN AREA CLINIC 19 | Facility: CLINIC | Age: 82
End: 2022-11-29

## 2022-11-29 DIAGNOSIS — H35.3122: ICD-10-CM

## 2022-11-29 DIAGNOSIS — H35.3211: ICD-10-CM

## 2022-11-29 DIAGNOSIS — H43.392: ICD-10-CM

## 2022-11-29 DIAGNOSIS — H43.811: ICD-10-CM

## 2022-11-29 PROCEDURE — 92014 COMPRE OPH EXAM EST PT 1/>: CPT | Mod: 25

## 2022-11-29 PROCEDURE — C9257 BEVACIZUMAB INJECTION: HCPCS

## 2022-11-29 PROCEDURE — 92202 OPSCPY EXTND ON/MAC DRAW: CPT | Mod: 59

## 2022-11-29 PROCEDURE — 67028 INJECTION EYE DRUG: CPT

## 2022-11-29 PROCEDURE — 92134 CPTRZ OPH DX IMG PST SGM RTA: CPT

## 2022-11-29 ASSESSMENT — TONOMETRY
OS_IOP_MMHG: 8
OD_IOP_MMHG: 7

## 2022-11-29 ASSESSMENT — VISUAL ACUITY
OD_SC: 20/200
OS_PH: 20/30
OS_SC: 20/50-3

## 2023-01-19 ENCOUNTER — FOLLOW UP (OUTPATIENT)
Dept: URBAN - METROPOLITAN AREA CLINIC 19 | Facility: CLINIC | Age: 83
End: 2023-01-19

## 2023-01-19 DIAGNOSIS — H35.3122: ICD-10-CM

## 2023-01-19 DIAGNOSIS — H43.811: ICD-10-CM

## 2023-01-19 DIAGNOSIS — H43.392: ICD-10-CM

## 2023-01-19 DIAGNOSIS — H35.3211: ICD-10-CM

## 2023-01-19 PROCEDURE — 67028 INJECTION EYE DRUG: CPT

## 2023-01-19 PROCEDURE — 92202 OPSCPY EXTND ON/MAC DRAW: CPT

## 2023-01-19 PROCEDURE — 92134 CPTRZ OPH DX IMG PST SGM RTA: CPT

## 2023-01-19 PROCEDURE — 92014 COMPRE OPH EXAM EST PT 1/>: CPT | Mod: 25

## 2023-01-19 ASSESSMENT — VISUAL ACUITY
OD_SC: 10/200
OS_SC: 20/40-1

## 2023-01-19 ASSESSMENT — TONOMETRY
OS_IOP_MMHG: 6
OD_IOP_MMHG: 6

## 2023-03-16 ENCOUNTER — FOLLOW UP (OUTPATIENT)
Dept: URBAN - METROPOLITAN AREA CLINIC 19 | Facility: CLINIC | Age: 83
End: 2023-03-16

## 2023-03-16 DIAGNOSIS — H35.3211: ICD-10-CM

## 2023-03-16 DIAGNOSIS — H43.392: ICD-10-CM

## 2023-03-16 DIAGNOSIS — H43.811: ICD-10-CM

## 2023-03-16 DIAGNOSIS — H35.3122: ICD-10-CM

## 2023-03-16 PROCEDURE — 67028 INJECTION EYE DRUG: CPT

## 2023-03-16 PROCEDURE — 92134 CPTRZ OPH DX IMG PST SGM RTA: CPT

## 2023-03-16 PROCEDURE — 92014 COMPRE OPH EXAM EST PT 1/>: CPT | Mod: 25

## 2023-03-16 PROCEDURE — 92202 OPSCPY EXTND ON/MAC DRAW: CPT | Mod: 59

## 2023-03-16 ASSESSMENT — TONOMETRY
OD_IOP_MMHG: 7
OS_IOP_MMHG: 9

## 2023-03-16 ASSESSMENT — VISUAL ACUITY
OS_SC: 20/40
OD_SC: 10/200

## 2023-05-25 ENCOUNTER — FOLLOW UP (OUTPATIENT)
Dept: URBAN - METROPOLITAN AREA CLINIC 19 | Facility: CLINIC | Age: 83
End: 2023-05-25

## 2023-05-25 DIAGNOSIS — H35.3211: ICD-10-CM

## 2023-05-25 DIAGNOSIS — H35.3122: ICD-10-CM

## 2023-05-25 DIAGNOSIS — H43.392: ICD-10-CM

## 2023-05-25 DIAGNOSIS — H43.811: ICD-10-CM

## 2023-05-25 PROCEDURE — 92014 COMPRE OPH EXAM EST PT 1/>: CPT | Mod: 25

## 2023-05-25 PROCEDURE — 92202 OPSCPY EXTND ON/MAC DRAW: CPT | Mod: 59

## 2023-05-25 PROCEDURE — 92134 CPTRZ OPH DX IMG PST SGM RTA: CPT

## 2023-05-25 PROCEDURE — 67028 INJECTION EYE DRUG: CPT

## 2023-05-25 ASSESSMENT — VISUAL ACUITY
OS_SC: 20/60-3
OD_SC: 10/200
OS_PH: 20/40

## 2023-05-25 ASSESSMENT — TONOMETRY
OD_IOP_MMHG: 06
OS_IOP_MMHG: 07

## 2023-08-10 ENCOUNTER — FOLLOW UP (OUTPATIENT)
Dept: URBAN - METROPOLITAN AREA CLINIC 19 | Facility: CLINIC | Age: 83
End: 2023-08-10

## 2023-08-10 DIAGNOSIS — H35.3122: ICD-10-CM

## 2023-08-10 DIAGNOSIS — H35.3211: ICD-10-CM

## 2023-08-10 DIAGNOSIS — H43.811: ICD-10-CM

## 2023-08-10 PROCEDURE — 92134 CPTRZ OPH DX IMG PST SGM RTA: CPT

## 2023-08-10 PROCEDURE — 67028 INJECTION EYE DRUG: CPT

## 2023-08-10 PROCEDURE — 92202 OPSCPY EXTND ON/MAC DRAW: CPT | Mod: 59

## 2023-08-10 PROCEDURE — 92014 COMPRE OPH EXAM EST PT 1/>: CPT | Mod: 25

## 2023-08-10 ASSESSMENT — VISUAL ACUITY
OS_SC: 20/50-2
OD_SC: 10/200
OS_PH: 20/30-2

## 2023-08-10 ASSESSMENT — TONOMETRY
OD_IOP_MMHG: 06
OS_IOP_MMHG: 06

## 2023-11-09 ENCOUNTER — FOLLOW UP (OUTPATIENT)
Dept: URBAN - METROPOLITAN AREA CLINIC 19 | Facility: CLINIC | Age: 83
End: 2023-11-09

## 2023-11-09 DIAGNOSIS — H35.3122: ICD-10-CM

## 2023-11-09 DIAGNOSIS — H43.811: ICD-10-CM

## 2023-11-09 DIAGNOSIS — H35.3211: ICD-10-CM

## 2023-11-09 PROCEDURE — 92014 COMPRE OPH EXAM EST PT 1/>: CPT | Mod: 25

## 2023-11-09 PROCEDURE — 92134 CPTRZ OPH DX IMG PST SGM RTA: CPT

## 2023-11-09 PROCEDURE — 67028 INJECTION EYE DRUG: CPT

## 2023-11-09 PROCEDURE — 92202 OPSCPY EXTND ON/MAC DRAW: CPT | Mod: 59

## 2023-11-09 ASSESSMENT — VISUAL ACUITY
OD_SC: 10/200
OS_SC: 20/50-1

## 2023-11-09 ASSESSMENT — TONOMETRY
OS_IOP_MMHG: 5
OD_IOP_MMHG: 10

## 2024-02-08 ENCOUNTER — FOLLOW UP (OUTPATIENT)
Dept: URBAN - METROPOLITAN AREA CLINIC 19 | Facility: CLINIC | Age: 84
End: 2024-02-08

## 2024-02-08 DIAGNOSIS — H43.811: ICD-10-CM

## 2024-02-08 DIAGNOSIS — H35.3122: ICD-10-CM

## 2024-02-08 DIAGNOSIS — E11.9: ICD-10-CM

## 2024-02-08 DIAGNOSIS — H35.3211: ICD-10-CM

## 2024-02-08 DIAGNOSIS — H43.392: ICD-10-CM

## 2024-02-08 PROCEDURE — 92134 CPTRZ OPH DX IMG PST SGM RTA: CPT

## 2024-02-08 PROCEDURE — C9257 BEVACIZUMAB INJECTION: HCPCS | Mod: JZ

## 2024-02-08 PROCEDURE — 92014 COMPRE OPH EXAM EST PT 1/>: CPT

## 2024-02-08 PROCEDURE — 92202 OPSCPY EXTND ON/MAC DRAW: CPT | Mod: 59

## 2024-02-08 PROCEDURE — 67028 INJECTION EYE DRUG: CPT

## 2024-02-08 ASSESSMENT — VISUAL ACUITY
OS_SC: 20/50
OD_SC: 10/200

## 2024-02-08 ASSESSMENT — TONOMETRY
OS_IOP_MMHG: 11
OD_IOP_MMHG: 10

## 2024-04-20 NOTE — ED ADULT NURSE NOTE - FINAL NURSING ELECTRONIC SIGNATURE
MD to place stat hydralazine order. MD to assess pt at bedside. Will continue to monitor patient and follow care plan. 24-Mar-2022 14:32

## 2024-05-16 ENCOUNTER — FOLLOW UP (OUTPATIENT)
Dept: URBAN - METROPOLITAN AREA CLINIC 19 | Facility: CLINIC | Age: 84
End: 2024-05-16

## 2024-05-16 DIAGNOSIS — H43.811: ICD-10-CM

## 2024-05-16 DIAGNOSIS — H35.3122: ICD-10-CM

## 2024-05-16 DIAGNOSIS — E11.9: ICD-10-CM

## 2024-05-16 DIAGNOSIS — H35.3211: ICD-10-CM

## 2024-05-16 DIAGNOSIS — H43.392: ICD-10-CM

## 2024-05-16 PROCEDURE — 67028 INJECTION EYE DRUG: CPT

## 2024-05-16 PROCEDURE — 92014 COMPRE OPH EXAM EST PT 1/>: CPT | Mod: 25

## 2024-05-16 PROCEDURE — 92202 OPSCPY EXTND ON/MAC DRAW: CPT | Mod: 59

## 2024-05-16 PROCEDURE — 92134 CPTRZ OPH DX IMG PST SGM RTA: CPT

## 2024-05-16 PROCEDURE — C9257 BEVACIZUMAB INJECTION: HCPCS | Mod: JZ

## 2024-05-16 ASSESSMENT — TONOMETRY
OD_IOP_MMHG: 5
OS_IOP_MMHG: 6

## 2024-05-16 ASSESSMENT — VISUAL ACUITY
OS_SC: 20/50-2
OD_SC: 5/200

## 2024-08-15 ENCOUNTER — FOLLOW UP (OUTPATIENT)
Dept: URBAN - METROPOLITAN AREA CLINIC 19 | Facility: CLINIC | Age: 84
End: 2024-08-15

## 2024-08-15 DIAGNOSIS — H35.3122: ICD-10-CM

## 2024-08-15 DIAGNOSIS — H35.3211: ICD-10-CM

## 2024-08-15 DIAGNOSIS — H26.491: ICD-10-CM

## 2024-08-15 DIAGNOSIS — H43.392: ICD-10-CM

## 2024-08-15 DIAGNOSIS — E11.9: ICD-10-CM

## 2024-08-15 DIAGNOSIS — H43.811: ICD-10-CM

## 2024-08-15 PROCEDURE — 92014 COMPRE OPH EXAM EST PT 1/>: CPT | Mod: 25

## 2024-08-15 PROCEDURE — 92202 OPSCPY EXTND ON/MAC DRAW: CPT | Mod: 59

## 2024-08-15 PROCEDURE — C9257 BEVACIZUMAB INJECTION: HCPCS | Mod: JZ

## 2024-08-15 PROCEDURE — 92134 CPTRZ OPH DX IMG PST SGM RTA: CPT

## 2024-08-15 PROCEDURE — 67028 INJECTION EYE DRUG: CPT

## 2024-08-15 ASSESSMENT — TONOMETRY
OD_IOP_MMHG: 6
OS_IOP_MMHG: 8

## 2024-08-15 ASSESSMENT — VISUAL ACUITY
OS_SC: 20/50
OD_SC: 5/200

## 2024-09-30 NOTE — PATIENT PROFILE ADULT - LEGAL HELP
Patient called asking if doctor can order a diagnostic for both breast. And if patient can be called when order ready.thanks    no
